# Patient Record
Sex: MALE | Race: WHITE | Employment: FULL TIME | URBAN - METROPOLITAN AREA
[De-identification: names, ages, dates, MRNs, and addresses within clinical notes are randomized per-mention and may not be internally consistent; named-entity substitution may affect disease eponyms.]

---

## 2021-05-05 ENCOUNTER — TRANSFERRED RECORDS (OUTPATIENT)
Dept: HEALTH INFORMATION MANAGEMENT | Facility: CLINIC | Age: 46
End: 2021-05-05

## 2021-12-02 ENCOUNTER — TELEPHONE (OUTPATIENT)
Dept: FAMILY MEDICINE | Facility: OTHER | Age: 46
End: 2021-12-02
Payer: COMMERCIAL

## 2021-12-02 NOTE — TELEPHONE ENCOUNTER
Patient is wondering if he can Establish Care with . Patient was told by nurse that he could be a new patient. Please call patient at [phone].

## 2021-12-06 NOTE — PROGRESS NOTES
Assessment & Plan     Need for prophylactic vaccination and inoculation against influenza  Shot given - had covid shot  - INFLUENZA VACCINE IM > 6 MONTHS VALENT IIV4 (AFLURIA/FLUZONE)    Type 1 diabetes mellitus without complication (H)  Fair control. Could be better. Will send to [facility] for to try getting a pump with CGM in it. No change in insulin for now. Work on pump ASAP. Pt agrees. Will see back in 6-7 weeks.   - CBC with Platelets & Differential  - Comprehensive metabolic panel  - Lipid Profile  - TSH  - Hemoglobin A1c  - Albumin Random Urine Quantitative with Creat Ratio  - Diabetes Education Referral ([place])  - insulin aspart (NOVOLOG PEN) 100 UNIT/ML pen; Inject 25 Units Subcutaneous 4 times daily (with meals and nightly)  - lisinopril (ZESTRIL) 5 MG tablet; Take 1 tablet (5 mg) by mouth daily    Erectile dysfunction due to diseases classified elsewhere  Risk and benefits of viagra and  like agent  was discussed and verbal consent to proceed was given.   Will try Viagra again. No h/o CAD or angina   - sildenafil (REVATIO) 20 MG tablet; 2-5 tablets orally 1 hour before intercourse    Mixed hyperlipidemia  Will add statin . Risk and benefits of statin was discussed and verbal consent to proceed was given. Follow-up in 6-7 bweeks   - atorvastatin (LIPITOR) 20 MG tablet; Take 1 tablet (20 mg) by mouth daily    Depression with anxiety  RF needed. Doing well..   - escitalopram (LEXAPRO) 20 MG tablet; Take 1 tablet (20 mg) by mouth daily    Encounter for medical examination to establish care  Will assume care    Elevated LFTs  Likely CISSE.  Will recheck in 6-7 weeks.       Review of external notes as documented elsewhere in note  Diagnosis or treatment significantly limited by social determinants of health - few notes from other facilities and new pt info pack reviewed   45 minutes spent on the date of the encounter doing chart review, history and exam, documentation and further activities per the note      "  BMI:   Estimated body mass index is 32.78 kg/m  as calculated from the following:    Height as of this encounter: 1.753 m (5' 9\").    Weight as of this encounter: 100.7 kg (222 lb).           No follow-ups on file.    Selwyn Ashraf MD  [facility] - SANJEEV Vázquez is a 46 year old who presents for the following health issues     Pt moved from [place]   HPI     Diabetes Follow-up    How often are you checking your blood sugar? Four or more times daily  Blood sugar testing frequency justification:  Adjustment of medication(s)  What time of day are you checking your blood sugars (select all that apply)?  Before meals  Have you had any blood sugars above 200?  Yes frequent   Have you had any blood sugars below 70?  No    What symptoms do you notice when your blood sugar is low?  Shaky, Dizzy and Weak    What concerns do you have today about your diabetes? None     Do you have any of these symptoms? (Select all that apply)  No numbness or tingling in feet.  No redness, sores or blisters on feet.  No complaints of excessive thirst.  No reports of blurry vision.  No significant changes to weight.    Was on a insulin pump then lost insurance coverage  A1C around 8   More highs than low BS   Checks finger sticks 4-5 times a day   On 4 time of day Novolog    BP Readings from Last 2 Encounters:   12/15/21 108/72     No results found for: A1C, LDL      Hypertension Follow-up      Do you check your blood pressure regularly outside of the clinic? No     Are you following a low salt diet? No    Are your blood pressures ever more than 140 on the top number (systolic) OR more   than 90 on the bottom number (diastolic), for example 140/90? No    Depression and Anxiety Follow-Up    How are you doing with your depression since your last visit? Improved     How are you doing with your anxiety since your last visit?  Improved     Are you having other symptoms that might be associated with " "depression or anxiety? No    Have you had a significant life event? No     Do you have any concerns with your use of alcohol or other drugs? No    Social History     Tobacco Use     Smoking status: Never Smoker     Smokeless tobacco: Never Used   Substance Use Topics     Alcohol use: Yes     Drug use: Not on file     PHQ 12/15/2021   PHQ-9 Total Score 4   Q9: Thoughts of better off dead/self-harm past 2 weeks Not at all     SIOBHAN-7 SCORE 12/15/2021   Total Score 0     Last PHQ-9 12/15/2021   1.  Little interest or pleasure in doing things 0   2.  Feeling down, depressed, or hopeless 0   3.  Trouble falling or staying asleep, or sleeping too much 2   4.  Feeling tired or having little energy 2   5.  Poor appetite or overeating 0   6.  Feeling bad about yourself 0   7.  Trouble concentrating 0   8.  Moving slowly or restless 0   Q9: Thoughts of better off dead/self-harm past 2 weeks 0   PHQ-9 Total Score 4   Difficulty at work, home, or with people Somewhat difficult     SIOBHAN-7  12/15/2021   1. Feeling nervous, anxious, or on edge 0   2. Not being able to stop or control worrying 0   3. Worrying too much about different things 0   4. Trouble relaxing 0   5. Being so restless that it is hard to sit still 0   6. Becoming easily annoyed or irritable 0   7. Feeling afraid, as if something awful might happen 0   SIOBHAN-7 Total Score 0       Suicide Assessment Five-step Evaluation and Treatment (SAFE-T)            Review of Systems   Constitutional, HEENT, cardiovascular, pulmonary, gi and gu systems are negative, except as otherwise noted.    ED  Issues - tried Viagra remotely - caused HA  Objective    /72   Pulse 88   Temp (!) 96.1  F (35.6  C)   Ht 1.753 m (5' 9\")   Wt 100.7 kg (222 lb)   SpO2 96%   BMI 32.78 kg/m    Body mass index is 32.78 kg/m .  Physical Exam   GENERAL: healthy, alert and no distress  NECK: no adenopathy, no asymmetry, masses, or scars and thyroid normal to palpation  RESP: lungs clear to " auscultation - no rales, rhonchi or wheezes  CV: regular rate and rhythm, normal S1 S2, no S3 or S4, no murmur, click or rub, no peripheral edema and peripheral pulses strong  ABDOMEN: soft, nontender, no hepatosplenomegaly, no masses and bowel sounds normal  MS: no gross musculoskeletal defects noted, no edema    Results for orders placed or performed in visit on 12/15/21   Comprehensive metabolic panel     Status: Abnormal   Result Value Ref Range    Sodium 137 133 - 144 mmol/L    Potassium 3.9 3.4 - 5.3 mmol/L    Chloride 104 94 - 109 mmol/L    Carbon Dioxide (CO2) 25 20 - 32 mmol/L    Anion Gap 8 3 - 14 mmol/L    Urea Nitrogen 15 7 - 30 mg/dL    Creatinine 0.69 0.66 - 1.25 mg/dL    Calcium 8.8 8.5 - 10.1 mg/dL    Glucose 207 (H) 70 - 99 mg/dL    Alkaline Phosphatase 128 40 - 150 U/L    AST 97 (H) 0 - 45 U/L     (H) 0 - 70 U/L    Protein Total 7.0 6.8 - 8.8 g/dL    Albumin 3.9 3.4 - 5.0 g/dL    Bilirubin Total 0.5 0.2 - 1.3 mg/dL    GFR Estimate >90 >60 mL/min/1.73m2   Lipid Profile     Status: Abnormal   Result Value Ref Range    Cholesterol 229 (H) <200 mg/dL    Triglycerides 131 <150 mg/dL    Direct Measure HDL 60 >=40 mg/dL    LDL Cholesterol Calculated 143 (H) <=100 mg/dL    Non HDL Cholesterol 169 (H) <130 mg/dL    Patient Fasting > 8hrs? Yes     Narrative    Cholesterol  Desirable:  <200 mg/dL    Triglycerides  Normal:  Less than 150 mg/dL  Borderline High:  150-199 mg/dL  High:  200-499 mg/dL  Very High:  Greater than or equal to 500 mg/dL    Direct Measure HDL  Female:  Greater than or equal to 50 mg/dL   Male:  Greater than or equal to 40 mg/dL    LDL Cholesterol  Desirable:  <100mg/dL  Above Desirable:  100-129 mg/dL   Borderline High:  130-159 mg/dL   High:  160-189 mg/dL   Very High:  >= 190 mg/dL    Non HDL Cholesterol  Desirable:  130 mg/dL  Above Desirable:  130-159 mg/dL  Borderline High:  160-189 mg/dL  High:  190-219 mg/dL  Very High:  Greater than or equal to 220 mg/dL   TSH     Status:  Normal   Result Value Ref Range    TSH 0.96 0.40 - 4.00 mU/L   Hemoglobin A1c     Status: Abnormal   Result Value Ref Range    Estimated Average Glucose 200 mg/dL    Hemoglobin A1C 8.6 (H) 0.0 - 5.6 %   CBC with platelets and differential     Status: Abnormal   Result Value Ref Range    WBC Count 8.7 4.0 - 11.0 10e3/uL    RBC Count 4.93 4.40 - 5.90 10e6/uL    Hemoglobin 15.2 13.3 - 17.7 g/dL    Hematocrit 42.7 40.0 - 53.0 %    MCV 87 78 - 100 fL    MCH 30.8 26.5 - 33.0 pg    MCHC 35.6 31.5 - 36.5 g/dL    RDW 12.2 10.0 - 15.0 %    Platelet Count 223 150 - 450 10e3/uL    % Neutrophils 81 %    % Lymphocytes 9 %    % Monocytes 8 %    % Eosinophils 1 %    % Basophils 0 %    % Immature Granulocytes 1 %    NRBCs per 100 WBC 0 <1 /100    Absolute Neutrophils 7.2 1.6 - 8.3 10e3/uL    Absolute Lymphocytes 0.7 (L) 0.8 - 5.3 10e3/uL    Absolute Monocytes 0.7 0.0 - 1.3 10e3/uL    Absolute Eosinophils 0.0 0.0 - 0.7 10e3/uL    Absolute Basophils 0.0 0.0 - 0.2 10e3/uL    Absolute Immature Granulocytes 0.0 <=0.4 10e3/uL    Absolute NRBCs 0.0 10e3/uL   CBC with Platelets & Differential     Status: Abnormal    Narrative    The following orders were created for panel order CBC with Platelets & Differential.  Procedure                               Abnormality         Status                     ---------                               -----------         ------                     CBC with platelets and d...[[id number]]  Abnormal            Final result                 Please view results for these tests on the individual orders.

## 2021-12-15 ENCOUNTER — OFFICE VISIT (OUTPATIENT)
Dept: FAMILY MEDICINE | Facility: OTHER | Age: 46
End: 2021-12-15
Attending: FAMILY MEDICINE
Payer: COMMERCIAL

## 2021-12-15 VITALS
WEIGHT: 222 LBS | DIASTOLIC BLOOD PRESSURE: 72 MMHG | SYSTOLIC BLOOD PRESSURE: 108 MMHG | HEIGHT: 69 IN | OXYGEN SATURATION: 96 % | HEART RATE: 88 BPM | TEMPERATURE: 96.1 F | BODY MASS INDEX: 32.88 KG/M2

## 2021-12-15 DIAGNOSIS — R79.89 ELEVATED LFTS: ICD-10-CM

## 2021-12-15 DIAGNOSIS — N52.1 ERECTILE DYSFUNCTION DUE TO DISEASES CLASSIFIED ELSEWHERE: ICD-10-CM

## 2021-12-15 DIAGNOSIS — E78.2 MIXED HYPERLIPIDEMIA: ICD-10-CM

## 2021-12-15 DIAGNOSIS — F41.8 DEPRESSION WITH ANXIETY: ICD-10-CM

## 2021-12-15 DIAGNOSIS — Z23 NEED FOR PROPHYLACTIC VACCINATION AND INOCULATION AGAINST INFLUENZA: Primary | ICD-10-CM

## 2021-12-15 DIAGNOSIS — Z00.00 ENCOUNTER FOR MEDICAL EXAMINATION TO ESTABLISH CARE: ICD-10-CM

## 2021-12-15 DIAGNOSIS — E10.9 TYPE 1 DIABETES MELLITUS WITHOUT COMPLICATION (H): ICD-10-CM

## 2021-12-15 LAB
ALBUMIN SERPL-MCNC: 3.9 G/DL (ref 3.4–5)
ALP SERPL-CCNC: 128 U/L (ref 40–150)
ALT SERPL W P-5'-P-CCNC: 129 U/L (ref 0–70)
ANION GAP SERPL CALCULATED.3IONS-SCNC: 8 MMOL/L (ref 3–14)
AST SERPL W P-5'-P-CCNC: 97 U/L (ref 0–45)
BASOPHILS # BLD AUTO: 0 10E3/UL (ref 0–0.2)
BASOPHILS NFR BLD AUTO: 0 %
BILIRUB SERPL-MCNC: 0.5 MG/DL (ref 0.2–1.3)
BUN SERPL-MCNC: 15 MG/DL (ref 7–30)
CALCIUM SERPL-MCNC: 8.8 MG/DL (ref 8.5–10.1)
CHLORIDE BLD-SCNC: 104 MMOL/L (ref 94–109)
CHOLEST SERPL-MCNC: 229 MG/DL
CO2 SERPL-SCNC: 25 MMOL/L (ref 20–32)
CREAT SERPL-MCNC: 0.69 MG/DL (ref 0.66–1.25)
CREAT UR-MCNC: 127 MG/DL
EOSINOPHIL # BLD AUTO: 0 10E3/UL (ref 0–0.7)
EOSINOPHIL NFR BLD AUTO: 1 %
ERYTHROCYTE [DISTWIDTH] IN BLOOD BY AUTOMATED COUNT: 12.2 % (ref 10–15)
EST. AVERAGE GLUCOSE BLD GHB EST-MCNC: 200 MG/DL
FASTING STATUS PATIENT QL REPORTED: YES
GFR SERPL CREATININE-BSD FRML MDRD: >90 ML/MIN/1.73M2
GLUCOSE BLD-MCNC: 207 MG/DL (ref 70–99)
HBA1C MFR BLD: 8.6 % (ref 0–5.6)
HCT VFR BLD AUTO: 42.7 % (ref 40–53)
HDLC SERPL-MCNC: 60 MG/DL
HGB BLD-MCNC: 15.2 G/DL (ref 13.3–17.7)
IMM GRANULOCYTES # BLD: 0 10E3/UL
IMM GRANULOCYTES NFR BLD: 1 %
LDLC SERPL CALC-MCNC: 143 MG/DL
LYMPHOCYTES # BLD AUTO: 0.7 10E3/UL (ref 0.8–5.3)
LYMPHOCYTES NFR BLD AUTO: 9 %
MCH RBC QN AUTO: 30.8 PG (ref 26.5–33)
MCHC RBC AUTO-ENTMCNC: 35.6 G/DL (ref 31.5–36.5)
MCV RBC AUTO: 87 FL (ref 78–100)
MICROALBUMIN UR-MCNC: 7 MG/L
MICROALBUMIN/CREAT UR: 5.51 MG/G CR (ref 0–17)
MONOCYTES # BLD AUTO: 0.7 10E3/UL (ref 0–1.3)
MONOCYTES NFR BLD AUTO: 8 %
NEUTROPHILS # BLD AUTO: 7.2 10E3/UL (ref 1.6–8.3)
NEUTROPHILS NFR BLD AUTO: 81 %
NONHDLC SERPL-MCNC: 169 MG/DL
NRBC # BLD AUTO: 0 10E3/UL
NRBC BLD AUTO-RTO: 0 /100
PLATELET # BLD AUTO: 223 10E3/UL (ref 150–450)
POTASSIUM BLD-SCNC: 3.9 MMOL/L (ref 3.4–5.3)
PROT SERPL-MCNC: 7 G/DL (ref 6.8–8.8)
RBC # BLD AUTO: 4.93 10E6/UL (ref 4.4–5.9)
SODIUM SERPL-SCNC: 137 MMOL/L (ref 133–144)
TRIGL SERPL-MCNC: 131 MG/DL
TSH SERPL DL<=0.005 MIU/L-ACNC: 0.96 MU/L (ref 0.4–4)
WBC # BLD AUTO: 8.7 10E3/UL (ref 4–11)

## 2021-12-15 PROCEDURE — 90686 IIV4 VACC NO PRSV 0.5 ML IM: CPT | Performed by: FAMILY MEDICINE

## 2021-12-15 PROCEDURE — 83036 HEMOGLOBIN GLYCOSYLATED A1C: CPT | Performed by: FAMILY MEDICINE

## 2021-12-15 PROCEDURE — 80061 LIPID PANEL: CPT | Performed by: FAMILY MEDICINE

## 2021-12-15 PROCEDURE — 90471 IMMUNIZATION ADMIN: CPT | Performed by: FAMILY MEDICINE

## 2021-12-15 PROCEDURE — 82043 UR ALBUMIN QUANTITATIVE: CPT | Performed by: FAMILY MEDICINE

## 2021-12-15 PROCEDURE — 80050 GENERAL HEALTH PANEL: CPT | Performed by: FAMILY MEDICINE

## 2021-12-15 PROCEDURE — 36415 COLL VENOUS BLD VENIPUNCTURE: CPT | Performed by: FAMILY MEDICINE

## 2021-12-15 PROCEDURE — 99204 OFFICE O/P NEW MOD 45 MIN: CPT | Mod: 25 | Performed by: FAMILY MEDICINE

## 2021-12-15 RX ORDER — LISINOPRIL 5 MG/1
5 TABLET ORAL DAILY
COMMUNITY
End: 2021-12-15

## 2021-12-15 RX ORDER — ATORVASTATIN CALCIUM 20 MG/1
20 TABLET, FILM COATED ORAL DAILY
Qty: 60 TABLET | Refills: 0 | Status: SHIPPED | OUTPATIENT
Start: 2021-12-15 | End: 2022-01-10

## 2021-12-15 RX ORDER — LISINOPRIL 5 MG/1
5 TABLET ORAL DAILY
Qty: 90 TABLET | Refills: 3 | Status: SHIPPED | OUTPATIENT
Start: 2021-12-15 | End: 2022-03-29

## 2021-12-15 RX ORDER — ESCITALOPRAM OXALATE 20 MG/1
20 TABLET ORAL DAILY
Qty: 90 TABLET | Refills: 3 | Status: SHIPPED | OUTPATIENT
Start: 2021-12-15 | End: 2022-03-29

## 2021-12-15 RX ORDER — ESCITALOPRAM OXALATE 20 MG/1
20 TABLET ORAL DAILY
COMMUNITY
End: 2021-12-15

## 2021-12-15 RX ORDER — SILDENAFIL CITRATE 20 MG/1
TABLET ORAL
Qty: 30 TABLET | Refills: 3 | Status: SHIPPED | OUTPATIENT
Start: 2021-12-15 | End: 2022-03-04

## 2021-12-15 ASSESSMENT — PAIN SCALES - GENERAL: PAINLEVEL: NO PAIN (0)

## 2021-12-15 ASSESSMENT — ANXIETY QUESTIONNAIRES
3. WORRYING TOO MUCH ABOUT DIFFERENT THINGS: NOT AT ALL
5. BEING SO RESTLESS THAT IT IS HARD TO SIT STILL: NOT AT ALL
7. FEELING AFRAID AS IF SOMETHING AWFUL MIGHT HAPPEN: NOT AT ALL
GAD7 TOTAL SCORE: 0
6. BECOMING EASILY ANNOYED OR IRRITABLE: NOT AT ALL
1. FEELING NERVOUS, ANXIOUS, OR ON EDGE: NOT AT ALL
4. TROUBLE RELAXING: NOT AT ALL
2. NOT BEING ABLE TO STOP OR CONTROL WORRYING: NOT AT ALL

## 2021-12-15 ASSESSMENT — MIFFLIN-ST. JEOR: SCORE: 1877.37

## 2021-12-15 ASSESSMENT — PATIENT HEALTH QUESTIONNAIRE - PHQ9: SUM OF ALL RESPONSES TO PHQ QUESTIONS 1-9: 4

## 2021-12-15 NOTE — NURSING NOTE
"Chief Complaint   Patient presents with     Establish Care       Initial /72   Pulse 88   Temp (!) 96.1  F (35.6  C)   Ht 1.753 m (5' 9\")   Wt 100.7 kg (222 lb)   SpO2 96%   BMI 32.78 kg/m   Estimated body mass index is 32.78 kg/m  as calculated from the following:    Height as of this encounter: 1.753 m (5' 9\").    Weight as of this encounter: 100.7 kg (222 lb).  Medication Reconciliation: complete  Richard Cao LPN  "

## 2021-12-15 NOTE — PATIENT INSTRUCTIONS
Results for orders placed or performed in visit on 12/15/21   Comprehensive metabolic panel     Status: Abnormal   Result Value Ref Range    Sodium 137 133 - 144 mmol/L    Potassium 3.9 3.4 - 5.3 mmol/L    Chloride 104 94 - 109 mmol/L    Carbon Dioxide (CO2) 25 20 - 32 mmol/L    Anion Gap 8 3 - 14 mmol/L    Urea Nitrogen 15 7 - 30 mg/dL    Creatinine 0.69 0.66 - 1.25 mg/dL    Calcium 8.8 8.5 - 10.1 mg/dL    Glucose 207 (H) 70 - 99 mg/dL    Alkaline Phosphatase 128 40 - 150 U/L    AST 97 (H) 0 - 45 U/L     (H) 0 - 70 U/L    Protein Total 7.0 6.8 - 8.8 g/dL    Albumin 3.9 3.4 - 5.0 g/dL    Bilirubin Total 0.5 0.2 - 1.3 mg/dL    GFR Estimate >90 >60 mL/min/1.73m2   Lipid Profile     Status: Abnormal   Result Value Ref Range    Cholesterol 229 (H) <200 mg/dL    Triglycerides 131 <150 mg/dL    Direct Measure HDL 60 >=40 mg/dL    LDL Cholesterol Calculated 143 (H) <=100 mg/dL    Non HDL Cholesterol 169 (H) <130 mg/dL    Patient Fasting > 8hrs? Yes     Narrative    Cholesterol  Desirable:  <200 mg/dL    Triglycerides  Normal:  Less than 150 mg/dL  Borderline High:  150-199 mg/dL  High:  200-499 mg/dL  Very High:  Greater than or equal to 500 mg/dL    Direct Measure HDL  Female:  Greater than or equal to 50 mg/dL   Male:  Greater than or equal to 40 mg/dL    LDL Cholesterol  Desirable:  <100mg/dL  Above Desirable:  100-129 mg/dL   Borderline High:  130-159 mg/dL   High:  160-189 mg/dL   Very High:  >= 190 mg/dL    Non HDL Cholesterol  Desirable:  130 mg/dL  Above Desirable:  130-159 mg/dL  Borderline High:  160-189 mg/dL  High:  190-219 mg/dL  Very High:  Greater than or equal to 220 mg/dL   TSH     Status: Normal   Result Value Ref Range    TSH 0.96 0.40 - 4.00 mU/L   Hemoglobin A1c     Status: Abnormal   Result Value Ref Range    Estimated Average Glucose 200 mg/dL    Hemoglobin A1C 8.6 (H) 0.0 - 5.6 %   Albumin Random Urine Quantitative with Creat Ratio     Status: None   Result Value Ref Range    Creatinine Urine  mg/dL 127 mg/dL    Albumin Urine mg/L 7 mg/L    Albumin Urine mg/g Cr 5.51 0.00 - 17.00 mg/g Cr   CBC with platelets and differential     Status: Abnormal   Result Value Ref Range    WBC Count 8.7 4.0 - 11.0 10e3/uL    RBC Count 4.93 4.40 - 5.90 10e6/uL    Hemoglobin 15.2 13.3 - 17.7 g/dL    Hematocrit 42.7 40.0 - 53.0 %    MCV 87 78 - 100 fL    MCH 30.8 26.5 - 33.0 pg    MCHC 35.6 31.5 - 36.5 g/dL    RDW 12.2 10.0 - 15.0 %    Platelet Count 223 150 - 450 10e3/uL    % Neutrophils 81 %    % Lymphocytes 9 %    % Monocytes 8 %    % Eosinophils 1 %    % Basophils 0 %    % Immature Granulocytes 1 %    NRBCs per 100 WBC 0 <1 /100    Absolute Neutrophils 7.2 1.6 - 8.3 10e3/uL    Absolute Lymphocytes 0.7 (L) 0.8 - 5.3 10e3/uL    Absolute Monocytes 0.7 0.0 - 1.3 10e3/uL    Absolute Eosinophils 0.0 0.0 - 0.7 10e3/uL    Absolute Basophils 0.0 0.0 - 0.2 10e3/uL    Absolute Immature Granulocytes 0.0 <=0.4 10e3/uL    Absolute NRBCs 0.0 10e3/uL   CBC with Platelets & Differential     Status: Abnormal    Narrative    The following orders were created for panel order CBC with Platelets & Differential.  Procedure                               Abnormality         Status                     ---------                               -----------         ------                     CBC with platelets and d...[[id number]]  Abnormal            Final result                 Please view results for these tests on the individual orders.

## 2021-12-16 ASSESSMENT — ANXIETY QUESTIONNAIRES: GAD7 TOTAL SCORE: 0

## 2021-12-30 ENCOUNTER — ALLIED HEALTH/NURSE VISIT (OUTPATIENT)
Dept: EDUCATION SERVICES | Facility: OTHER | Age: 46
End: 2021-12-30
Attending: FAMILY MEDICINE
Payer: COMMERCIAL

## 2021-12-30 VITALS
DIASTOLIC BLOOD PRESSURE: 80 MMHG | WEIGHT: 229.9 LBS | SYSTOLIC BLOOD PRESSURE: 118 MMHG | RESPIRATION RATE: 16 BRPM | HEIGHT: 68 IN | BODY MASS INDEX: 34.84 KG/M2 | OXYGEN SATURATION: 98 % | HEART RATE: 85 BPM

## 2021-12-30 DIAGNOSIS — D21.20 FIBROMA OF FOOT, UNSPECIFIED LATERALITY: Primary | ICD-10-CM

## 2021-12-30 DIAGNOSIS — E10.9 TYPE 1 DIABETES MELLITUS WITHOUT COMPLICATION (H): ICD-10-CM

## 2021-12-30 PROCEDURE — 99214 OFFICE O/P EST MOD 30 MIN: CPT | Performed by: NURSE PRACTITIONER

## 2021-12-30 RX ORDER — INSULIN DEGLUDEC 100 U/ML
40 INJECTION, SOLUTION SUBCUTANEOUS DAILY
Qty: 15 ML | Refills: 1 | Status: SHIPPED | OUTPATIENT
Start: 2021-12-30 | End: 2022-05-24

## 2021-12-30 ASSESSMENT — MIFFLIN-ST. JEOR: SCORE: 1897.32

## 2021-12-30 ASSESSMENT — PAIN SCALES - GENERAL: PAINLEVEL: NO PAIN (0)

## 2021-12-30 NOTE — PATIENT INSTRUCTIONS
Continue working on healthy eating and moving (start low and slow, work up to 30 min, 5x/week)    BG goals:  Fasting and before meals <130, >80  2 hour after eating <180    We only need 1/2 of these numbers to be within target then your A1c will be within target    Medication changes   1.  Tresiba  Restart at 20 units daily    Titrate every 3-4 days based on your AM blood glucose    2.  Novolog  Keep 1:15 for now + correction      Follow up   1 week after insulin pump start    Call me sooner if any problems/concerns and/or questions develop including consistent low BGs <70 or consistent high BGs >200  [phone] (Unit Coordinator)    [phone] (Nurse)

## 2021-12-30 NOTE — PROGRESS NOTES
SUBJECTIVE:  Gurpreet Mg, 46 year old, male presents with the following Chief Complaint(s) with HPI to follow:  Chief Complaint   Patient presents with     Diabetes        Diabetes Follow-up      Patient is checking blood sugars: 3 times daily.  Results:  No meter      Symptoms of hypoglycemia (low blood sugar): none    Paresthesias (numbness or burning in feet) or sores: No    Diabetic eye exam within the last year: DUE    Breakfast eaten regularly: Yes    Patient counting carbs: Yes       HPI:  Gabriel's here today for the follow up regarding his Diabetes mellitus, JESSICA.    Lab Results   Component Value Date    A1C 8.6 12/15/2021     Current Diabetes medication:   1. Novolog 1:15 + correction. TDD: 100 units  2. Tresiba 40 units--hasn't been taking as he ran out  ASA use: no--intentional  Statin use: yes    Gabriel reports the following:  Diagnosed with diabetes: 26 years old  Family hx of diabetes: dad--type 2,     Lost weight prior to diagnosis--40 lb  He was also symptomatic    Wondering if he could get a referral   Issues with his plantar fibroma.      No other acute health concerns today.      Establishing care with Dr. Ashraf    Interested in a personal CGM and possibly an insulin pump    Patient Active Problem List   Diagnosis     Erectile dysfunction due to diseases classified elsewhere     Type 1 diabetes mellitus without complication (H)     Depression with anxiety     Mixed hyperlipidemia     Elevated LFTs       Past Medical History:   Diagnosis Date     Anxiety      Depressive disorder      Hypertension      Type 1 diabetes (H)        Past Surgical History:   Procedure Laterality Date     APPENDECTOMY  01/1985     BACK SURGERY  2010    lumbar calcium deposits removed     CATARACT EXTRACTION Right 2021       Family History   Problem Relation Age of Onset     Hypertension Father      Hyperlipidemia Father      Obesity Father      Obesity Brother      Hypertension Brother      Depression Brother   "      Social History     Tobacco Use     Smoking status: Never Smoker     Smokeless tobacco: Never Used   Substance Use Topics     Alcohol use: Yes       Current Outpatient Medications   Medication Sig Dispense Refill     atorvastatin (LIPITOR) 20 MG tablet Take 1 tablet (20 mg) by mouth daily 60 tablet 0     escitalopram (LEXAPRO) 20 MG tablet Take 1 tablet (20 mg) by mouth daily 90 tablet 3     insulin aspart (NOVOLOG PEN) 100 UNIT/ML pen Inject 25 Units Subcutaneous 4 times daily (with meals and nightly) 15 mL 3     insulin degludec (TRESIBA FLEXTOUCH) 100 UNIT/ML pen Inject 40 Units Subcutaneous daily 15 mL 1     insulin pen needle (30G X 8 MM) 30G X 8 MM miscellaneous Use 3 pen needles daily or as directed. 100 each 11     lisinopril (ZESTRIL) 5 MG tablet Take 1 tablet (5 mg) by mouth daily 90 tablet 3     sildenafil (REVATIO) 20 MG tablet 2-5 tablets orally 1 hour before intercourse 30 tablet 3       No Known Allergies    REVIEW OF SYSTEMS  Skin: negative; brittle nails  Eyes: negative  Ears/Nose/Throat: negative  Respiratory: No shortness of breath, dyspnea on exertion, cough, or hemoptysis  Cardiovascular: negative  Gastrointestinal: negative  Genitourinary: negative  Musculoskeletal: negative  Neurologic: negative; hx of plantar fibroma   Psychiatric: positive for anxiety and depression  Hematologic/Lymphatic/Immunologic: negative  Endocrine: positive for diabetes    OBJECTIVE:  /80   Pulse 85   Resp 16   Ht 1.727 m (5' 8\")   Wt 104.3 kg (229 lb 14.4 oz)   SpO2 98%   BMI 34.96 kg/m    Constitutional: healthy, alert and no distress  Respiratory:  Good diaphragmatic excursion.   Musculoskeletal: extremities normal- no gross deformities noted and gait normal  Skin: no suspicious lesions or rashes  Psychiatric: mentation appears normal and affect normal/bright    LABS  No results found for any visits on 12/30/21.    ASSESSMENT / PLAN:  (D21.20) Fibroma of foot, unspecified laterality  (primary " encounter diagnosis)  Comment: noted  Plan: Orthopedic  Referral          Referral to Dr. Salazar    (E10.9) Type 1 diabetes mellitus without complication (H)  Comment: noted   Plan: insulin degludec (TRESIBA FLEXTOUCH) 100         UNIT/ML pen, insulin pen needle (30G X 8 MM)         30G X 8 MM miscellaneous          Reordered Tresiba.   Asked him to restart it at 20 units daily  Titrate every 3-4 days based on AM BGs.      Gave him a sample of the Dexcom G6  This was started in the clinic  Education on this works    Education also on what insulin pump are available.   He's interested in the Tandem t:slim x 2 with control IQ insulin pump.    Message sent to [employer] rep    Follow up  Let me know if you want me to order the Dexcom  Keep us in the loop with the pump    Patient Instructions   Continue working on healthy eating and moving (start low and slow, work up to 30 min, 5x/week)    BG goals:  Fasting and before meals <130, >80  2 hour after eating <180    We only need 1/2 of these numbers to be within target then your A1c will be within target    Medication changes   1.  Tresiba  Restart at 20 units daily    Titrate every 3-4 days based on your AM blood glucose    2.  Novolog  Keep 1:15 for now + correction      Follow up   1 week after insulin pump start    Call me sooner if any problems/concerns and/or questions develop including consistent low BGs <70 or consistent high BGs >200  [phone] (Unit Coordinator)    [phone] (Nurse)        Time: 35 min  Barrier: none  Willingness to learn: accepting    Evelin GUZMAN [facility]  Diabetes and Wound Care    Cc: Dr. Chauhan    35 minutes was spent with patient.    All of this time was spent on counseling patient regarding illness, medication and/or treatment options, coordinating further cares and follow ups that are needed along with resource material that will be helpful in the treatment of these issues.     With the electronic record, we can  now more quickly and easily track our patient diabetic goals. Our diabetes clinical review is in progress and these are the indicators we are monitoring for good diabetes health.     1.) HbA1C less than 7 (measurement of your average blood sugars)  2.) Blood Pressure less than 140/80  3.) LDL less than 100 (bad cholesterol)  4.) HbA1C is checked in the last 6 months and below 7% (more frequently if not at goal or adjusting medications)  5.) LDL is checked in the last 12 months (more frequently if not at goal or adjusting medications)  6.) Taking one baby aspirin daily (unless otherwise instructed)  7.) No tobacco use  8) Statin use     You have achieved 5 out of 8 of these and I am encouraging you to come in and get tuned up to achieve 8 out of 8!  Here is what you have achieved so far in my goals for you:  1.) HbA1C  less than 7:                              NO  Your last  HbA1C :  Lab Results   Component Value Date    A1C 8.6 12/15/2021   .  2.) Blood Pressure less than 140/80:       YES      Your last    BP Readings from Last 1 Encounters:   12/30/21 118/80     3.) LDL less than 100:                              NO   Your last     LDL Cholesterol Calculated   Date Value Ref Range Status   12/15/2021 143 (H) <=100 mg/dL Final       4.) Checked HbA1C in the past 6 months: YES      5.) Checked LDL in the past 12 months:    YES      6.) Taking one aspirin daily:                       NO  7.) No tobacco use:                                        YES      8.) Statin use      YES

## 2021-12-30 NOTE — PROGRESS NOTES
"Chief Complaint   Patient presents with     Diabetes       Initial /80   Pulse 85   Resp 16   Wt 104.3 kg (229 lb 14.4 oz)   SpO2 98%   BMI 33.95 kg/m   Estimated body mass index is 33.95 kg/m  as calculated from the following:    Height as of 12/15/21: 1.753 m (5' 9\").    Weight as of this encounter: 104.3 kg (229 lb 14.4 oz).  Medication Reconciliation: complete  Martha Tyler LPN    "

## 2022-01-09 DIAGNOSIS — E78.2 MIXED HYPERLIPIDEMIA: ICD-10-CM

## 2022-01-10 ENCOUNTER — TELEPHONE (OUTPATIENT)
Dept: EDUCATION SERVICES | Facility: OTHER | Age: 47
End: 2022-01-10
Payer: COMMERCIAL

## 2022-01-10 DIAGNOSIS — E10.9 TYPE 1 DIABETES MELLITUS WITHOUT COMPLICATION (H): Primary | ICD-10-CM

## 2022-01-10 RX ORDER — PROCHLORPERAZINE 25 MG/1
1 SUPPOSITORY RECTAL
Qty: 3 EACH | Refills: 11 | Status: SHIPPED | OUTPATIENT
Start: 2022-01-10 | End: 2022-03-29

## 2022-01-10 RX ORDER — PROCHLORPERAZINE 25 MG/1
1 SUPPOSITORY RECTAL ONCE
Qty: 1 EACH | Refills: 0 | Status: SHIPPED | OUTPATIENT
Start: 2022-01-10 | End: 2022-01-10

## 2022-01-10 RX ORDER — ATORVASTATIN CALCIUM 20 MG/1
TABLET, FILM COATED ORAL
Qty: 30 TABLET | Refills: 0 | Status: SHIPPED | OUTPATIENT
Start: 2022-01-10 | End: 2022-03-04

## 2022-01-10 RX ORDER — PROCHLORPERAZINE 25 MG/1
1 SUPPOSITORY RECTAL
Qty: 1 EACH | Refills: 3 | Status: SHIPPED | OUTPATIENT
Start: 2022-01-10 | End: 2022-03-29

## 2022-01-11 DIAGNOSIS — E10.9 TYPE 1 DIABETES MELLITUS WITHOUT COMPLICATION (H): Primary | ICD-10-CM

## 2022-01-27 ENCOUNTER — MEDICAL CORRESPONDENCE (OUTPATIENT)
Dept: HEALTH INFORMATION MANAGEMENT | Facility: CLINIC | Age: 47
End: 2022-01-27
Payer: COMMERCIAL

## 2022-02-08 ENCOUNTER — MEDICAL CORRESPONDENCE (OUTPATIENT)
Dept: HEALTH INFORMATION MANAGEMENT | Facility: CLINIC | Age: 47
End: 2022-02-08
Payer: COMMERCIAL

## 2022-02-17 ENCOUNTER — LAB (OUTPATIENT)
Dept: LAB | Facility: OTHER | Age: 47
End: 2022-02-17
Payer: COMMERCIAL

## 2022-02-17 DIAGNOSIS — E78.2 MIXED HYPERLIPIDEMIA: ICD-10-CM

## 2022-02-17 DIAGNOSIS — R79.89 ELEVATED LFTS: ICD-10-CM

## 2022-02-17 DIAGNOSIS — E10.9 TYPE 1 DIABETES MELLITUS WITHOUT COMPLICATION (H): Primary | ICD-10-CM

## 2022-02-17 LAB
ALBUMIN SERPL-MCNC: 3.9 G/DL (ref 3.4–5)
ALP SERPL-CCNC: 142 U/L (ref 40–150)
ALT SERPL W P-5'-P-CCNC: 50 U/L (ref 0–70)
ANION GAP SERPL CALCULATED.3IONS-SCNC: 14 MMOL/L (ref 3–14)
AST SERPL W P-5'-P-CCNC: 21 U/L (ref 0–45)
BILIRUB SERPL-MCNC: 0.9 MG/DL (ref 0.2–1.3)
BUN SERPL-MCNC: 21 MG/DL (ref 7–30)
CALCIUM SERPL-MCNC: 9.3 MG/DL (ref 8.5–10.1)
CHLORIDE BLD-SCNC: 95 MMOL/L (ref 94–109)
CHOLEST SERPL-MCNC: 246 MG/DL
CO2 SERPL-SCNC: 22 MMOL/L (ref 20–32)
CREAT SERPL-MCNC: 0.84 MG/DL (ref 0.66–1.25)
EST. AVERAGE GLUCOSE BLD GHB EST-MCNC: 209 MG/DL
GFR SERPL CREATININE-BSD FRML MDRD: >90 ML/MIN/1.73M2
GLUCOSE BLD-MCNC: 428 MG/DL (ref 70–99)
HBA1C MFR BLD: 8.9 % (ref 0–5.6)
HDLC SERPL-MCNC: 55 MG/DL
HOLD SPECIMEN: NORMAL
LDLC SERPL CALC-MCNC: 166 MG/DL
NONHDLC SERPL-MCNC: 191 MG/DL
POTASSIUM BLD-SCNC: 4.2 MMOL/L (ref 3.4–5.3)
PROT SERPL-MCNC: 6.9 G/DL (ref 6.8–8.8)
SODIUM SERPL-SCNC: 131 MMOL/L (ref 133–144)
TRIGL SERPL-MCNC: 125 MG/DL

## 2022-02-17 PROCEDURE — 83036 HEMOGLOBIN GLYCOSYLATED A1C: CPT | Performed by: FAMILY MEDICINE

## 2022-02-17 PROCEDURE — 36415 COLL VENOUS BLD VENIPUNCTURE: CPT | Performed by: FAMILY MEDICINE

## 2022-02-17 PROCEDURE — 80053 COMPREHEN METABOLIC PANEL: CPT | Performed by: FAMILY MEDICINE

## 2022-02-17 PROCEDURE — 80061 LIPID PANEL: CPT | Performed by: FAMILY MEDICINE

## 2022-02-17 NOTE — PROGRESS NOTES
"  Assessment & Plan     Type 1 diabetes mellitus without complication (H)  Still suboptimal but in transition to insulin pump and dexcom. No changes. Getting training next week.  Follow-up in 3-4 months. Continue with DRC   - Lipid Profile; Future  - Hepatic function panel; Future    Mixed hyperlipidemia  Restart Lipitor . Check fasting labs in 2 months   - atorvastatin (LIPITOR) 20 MG tablet; Take 1 tablet (20 mg) by mouth daily  - Lipid Profile; Future  - Hepatic function panel; Future    Erectile dysfunction due to diseases classified elsewhere  Long discussion . Most likely daily Cialis not going to work and he does not want to try.  Injection /penile suppository option -- most likely will need prosthesis. Will send to [facility] to discuss. Pt is very much in agreement . Pt is aware needs A1C  Better   - Adult Urology Referral; Future             BMI:   Estimated body mass index is 32.39 kg/m  as calculated from the following:    Height as of 12/30/21: 1.727 m (5' 8\").    Weight as of this encounter: 96.6 kg (213 lb).           No follow-ups on file.    Selwyn Ashraf MD  [facility] - SANJEE[facility] Javier is a 46 year old who presents for the following health issues     HPI     Diabetes Follow-up    How often are you checking your blood sugar? Four or more times daily  Blood sugar testing frequency justification:  discovering patterns   What time of day are you checking your blood sugars (select all that apply)?  Before meals and At bedtime  Have you had any blood sugars above 200?  Yes 300  Have you had any blood sugars below 70?  No    What symptoms do you notice when your blood sugar is low?  Shaky, Dizzy, Lethargy, Confusion and Other: sweaty    What concerns do you have today about your diabetes? None     Do you have any of these symptoms? (Select all that apply)  Weight loss 30-40lb     Have you had a diabetic eye exam in the last 12 months? Yes- Date of last eye exam: 6/2021,  " Location: [facility]     Just got insulin pump- waiting on training - next week   Has Dexcom and using that -- will then sink pump together   Likes Dexcom -- likes to see the trends    Viagra not working -- went up to 5 and even six   No morning erections no erections at all with viagra    Tried pump - not like it    Tried prn Cialis but not daily   No trauma to pelvis or back       Had issues getting RF on lipitor. Now off again when had labs drawn       Hyperlipidemia Follow-Up      Are you regularly taking any medication or supplement to lower your cholesterol?   Yes- Atorvastatin    Are you having muscle aches or other side effects that you think could be caused by your cholesterol lowering medication?  No    Hypertension Follow-up      Do you check your blood pressure regularly outside of the clinic? No     Are you following a low salt diet? No    Are your blood pressures ever more than 140 on the top number (systolic) OR more   than 90 on the bottom number (diastolic), for example 140/90? No    BP Readings from Last 2 Encounters:   03/04/22 113/70   12/30/21 118/80     Hemoglobin A1C (%)   Date Value   02/17/2022 8.9 (H)   12/15/2021 8.6 (H)     LDL Cholesterol Calculated (mg/dL)   Date Value   02/17/2022 166 (H)   12/15/2021 143 (H)       Pain History:  When did you first notice your pain? - More than 6 weeks   Have you seen this provider for your pain in the past?   No   Where in your body do your have pain? Joints, low back, muscles  Are you seeing anyone else for your pain? No  What makes your pain better? 800mg ibuprofen  What makes your pain worse? Doing physical things   How has pain affected your ability to work? 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 wife    PHQ-9 SCORE 12/15/2021   PHQ-9 Total Score 4       SIOBHAN-7 SCORE 12/15/2021   Total Score 0             Chronic Pain - Initial Assessment:    How  would you describe your pain? Muscle atrophy, cramping, numbness in right arm down to hand  Have you had any recent changes to the severity or character of your pain? Intermittent and changes locations  Is there an underlying cause that has been identified? no  Has your ability to work or do daily activities changed recently because of your pain? Causes discomfort  Which of these pain treatments have you tried? Chiropractor, Physical Therapy and Other: NSAIDS  Previous medication treatments:  NSAIDs - ibuprofen (Motrin)              Review of Systems   Constitutional, HEENT, cardiovascular, pulmonary, gi and gu systems are negative, except as otherwise noted.      Objective    /70 (BP Location: Right arm, Patient Position: Sitting, Cuff Size: Adult Large)   Pulse 99   Temp 97.6  F (36.4  C) (Tympanic)   Resp 16   Wt 96.6 kg (213 lb)   SpO2 98%   BMI 32.39 kg/m    Body mass index is 32.39 kg/m .  Physical Exam   GENERAL: healthy, alert and no distress  NECK: no adenopathy, no asymmetry, masses, or scars and thyroid normal to palpation  RESP: lungs clear to auscultation - no rales, rhonchi or wheezes  CV: regular rate and rhythm, normal S1 S2, no S3 or S4, no murmur, click or rub, no peripheral edema and peripheral pulses strong  MS: no gross musculoskeletal defects noted, no edema    No results found for this or any previous visit (from the past 48 hour(s)).    Orders Only on 02/17/2022   Component Date Value Ref Range Status     Sodium 02/17/2022 131 (A) 133 - 144 mmol/L Final     Potassium 02/17/2022 4.2  3.4 - 5.3 mmol/L Final     Chloride 02/17/2022 95  94 - 109 mmol/L Final     Carbon Dioxide (CO2) 02/17/2022 22  20 - 32 mmol/L Final     Anion Gap 02/17/2022 14  3 - 14 mmol/L Final     Urea Nitrogen 02/17/2022 21  7 - 30 mg/dL Final     Creatinine 02/17/2022 0.84  0.66 - 1.25 mg/dL Final     Calcium 02/17/2022 9.3  8.5 - 10.1 mg/dL Final     Glucose 02/17/2022 428 (A) 70 - 99 mg/dL Final     Alkaline  Phosphatase 02/17/2022 142  40 - 150 U/L Final     AST 02/17/2022 21  0 - 45 U/L Final     ALT 02/17/2022 50  0 - 70 U/L Final     Protein Total 02/17/2022 6.9  6.8 - 8.8 g/dL Final     Albumin 02/17/2022 3.9  3.4 - 5.0 g/dL Final     Bilirubin Total 02/17/2022 0.9  0.2 - 1.3 mg/dL Final     GFR Estimate 02/17/2022 >90  >60 mL/min/1.73m2 Final    Effective December 21, 2021 eGFRcr in adults is calculated using the 2021 CKD-EPI creatinine equation which includes age and gender (Ricky et al., NEJ, DOI: 10.1056/NDNYqu5172172)     Estimated Average Glucose 02/17/2022 209  mg/dL Final     Hemoglobin A1C 02/17/2022 8.9 (A) 0.0 - 5.6 % Final    Normal <5.7%   Prediabetes 5.7-6.4%    Diabetes 6.5% or higher     Note: Adopted from ADA consensus guidelines.     Cholesterol 02/17/2022 246 (A) <200 mg/dL Final     Triglycerides 02/17/2022 125  <150 mg/dL Final     Direct Measure HDL 02/17/2022 55  >=40 mg/dL Final     LDL Cholesterol Calculated 02/17/2022 166 (A) <=100 mg/dL Final     Non HDL Cholesterol 02/17/2022 191 (A) <130 mg/dL Final

## 2022-02-21 ENCOUNTER — MEDICAL CORRESPONDENCE (OUTPATIENT)
Dept: HEALTH INFORMATION MANAGEMENT | Facility: CLINIC | Age: 47
End: 2022-02-21
Payer: COMMERCIAL

## 2022-02-22 DIAGNOSIS — E10.9 TYPE 1 DIABETES MELLITUS WITHOUT COMPLICATION (H): Primary | ICD-10-CM

## 2022-02-22 NOTE — PROGRESS NOTES
Paperwork submitted for Tandem insulin pump start.     Novolog vials to his [facility] pharmacy.     Evelin GUZMAN [facility]-BC  Diabetes and Wound Care

## 2022-02-23 RX ORDER — INFUSION SET FOR INSULIN PUMP
INFUSION SETS-PARAPHERNALIA MISCELLANEOUS
COMMUNITY
Start: 2022-02-11 | End: 2022-03-24

## 2022-02-23 RX ORDER — PROCHLORPERAZINE 25 MG/1
SUPPOSITORY RECTAL
COMMUNITY
Start: 2022-01-12

## 2022-02-23 RX ORDER — INSULIN PUMP CARTRIDGE
CARTRIDGE (EA) SUBCUTANEOUS
COMMUNITY
Start: 2022-02-11 | End: 2022-03-24

## 2022-02-23 RX ORDER — SUBCUTANEOUS INSULIN PUMP
EACH MISCELLANEOUS
COMMUNITY
Start: 2022-02-11

## 2022-03-04 ENCOUNTER — OFFICE VISIT (OUTPATIENT)
Dept: FAMILY MEDICINE | Facility: OTHER | Age: 47
End: 2022-03-04
Attending: FAMILY MEDICINE
Payer: COMMERCIAL

## 2022-03-04 VITALS
BODY MASS INDEX: 32.39 KG/M2 | DIASTOLIC BLOOD PRESSURE: 70 MMHG | TEMPERATURE: 97.6 F | SYSTOLIC BLOOD PRESSURE: 113 MMHG | HEART RATE: 99 BPM | OXYGEN SATURATION: 98 % | RESPIRATION RATE: 16 BRPM | WEIGHT: 213 LBS

## 2022-03-04 DIAGNOSIS — E78.2 MIXED HYPERLIPIDEMIA: ICD-10-CM

## 2022-03-04 DIAGNOSIS — N52.1 ERECTILE DYSFUNCTION DUE TO DISEASES CLASSIFIED ELSEWHERE: ICD-10-CM

## 2022-03-04 DIAGNOSIS — E10.9 TYPE 1 DIABETES MELLITUS WITHOUT COMPLICATION (H): Primary | ICD-10-CM

## 2022-03-04 PROCEDURE — 99214 OFFICE O/P EST MOD 30 MIN: CPT | Performed by: FAMILY MEDICINE

## 2022-03-04 RX ORDER — ATORVASTATIN CALCIUM 20 MG/1
20 TABLET, FILM COATED ORAL DAILY
Qty: 90 TABLET | Refills: 0 | Status: SHIPPED | OUTPATIENT
Start: 2022-03-04 | End: 2022-03-29

## 2022-03-04 NOTE — NURSING NOTE
"Chief Complaint   Patient presents with     Recheck Medication       Initial /70 (BP Location: Right arm, Patient Position: Sitting, Cuff Size: Adult Large)   Pulse 99   Temp 97.6  F (36.4  C) (Tympanic)   Resp 16   Wt 96.6 kg (213 lb)   SpO2 98%   BMI 32.39 kg/m   Estimated body mass index is 32.39 kg/m  as calculated from the following:    Height as of 12/30/21: 1.727 m (5' 8\").    Weight as of this encounter: 96.6 kg (213 lb).  Medication Reconciliation: complete  Kali Paredes LPN  "

## 2022-03-07 ENCOUNTER — PRE VISIT (OUTPATIENT)
Dept: UROLOGY | Facility: CLINIC | Age: 47
End: 2022-03-07

## 2022-03-07 NOTE — TELEPHONE ENCOUNTER
MEDICAL RECORDS REQUEST   [facility]  Urology Clinic  [address] [place] [address]  PHONE: [phone]  Fax: [phone]        FUTURE VISIT INFORMATION                                                   Yaya Mg, : 1975 scheduled for future visit at [facility]    APPOINTMENT INFORMATION:    Date: 2022    Provider:  Emilia Mondragon PA    Reason for Visit/Diagnosis: Erectile dysfunction due to diseases classified elsewhere [N52.1]    REFERRAL INFORMATION:    Referring provider:  Selwyn Ashraf MD    Referring providers clinic:   [facility]    RECORDS REQUESTED FOR VISIT                                                     NOTES  STATUS/DETAILS   OFFICE NOTE from referring provider  yes, 2022, 12/15/2021 -- Selwyn Ashraf MD in  [facility]   MEDICATION LIST  yes     PRE-VISIT CHECKLIST      Record collection complete Yes   Appointment appropriately scheduled           (right time/right provider) Yes   Joint diagnostic appointment coordinated correctly          (ensure right order & amount of time) Yes   MyChart activation If no, please explain pending   Questionnaire complete If no, please explain pending

## 2022-03-08 ENCOUNTER — VIRTUAL VISIT (OUTPATIENT)
Dept: UROLOGY | Facility: CLINIC | Age: 47
End: 2022-03-08
Attending: FAMILY MEDICINE
Payer: COMMERCIAL

## 2022-03-08 DIAGNOSIS — N52.1 ERECTILE DYSFUNCTION DUE TO DISEASES CLASSIFIED ELSEWHERE: ICD-10-CM

## 2022-03-08 PROCEDURE — 99203 OFFICE O/P NEW LOW 30 MIN: CPT | Mod: 95 | Performed by: PHYSICIAN ASSISTANT

## 2022-03-08 NOTE — PROGRESS NOTES
Chief Complaint:   Erectile dysfunction          History of Present Illness:   Yaya Mg is a 46 year old male with a history of diabetes mellitus type I who presents for evaluation of erectile dysfunction.  Patient reports his ED has been consistently happening for over a year.  He is not able to achieve any erection at this time.  He has tried both Cialis, Viagra, and Levitra in the past with no success.  Patient also reports getting headaches with the oral PDE5 inhibitors.  He has also tried a TRINI without improvement.           Past Medical History:     Past Medical History:   Diagnosis Date     Anxiety      Depressive disorder      Hypertension      Type 1 diabetes (H)             Past Surgical History:     Past Surgical History:   Procedure Laterality Date     APPENDECTOMY  01/1985     BACK SURGERY  2010    lumbar calcium deposits removed     CATARACT EXTRACTION Right 2021            Medications     Current Outpatient Medications   Medication     ASPIRIN NOT PRESCRIBED (INTENTIONAL)     atorvastatin (LIPITOR) 20 MG tablet     Continuous Blood Gluc  (DEXCOM G6 ) JUSTEN     Continuous Blood Gluc Sensor (DEXCOM G6 SENSOR) MISC     Continuous Blood Gluc Transmit (DEXCOM G6 TRANSMITTER) MISC     escitalopram (LEXAPRO) 20 MG tablet     insulin aspart (NOVOLOG PEN) 100 UNIT/ML pen     insulin aspart (NOVOLOG VIAL) 100 UNITS/ML vial     insulin degludec (TRESIBA FLEXTOUCH) 100 UNIT/ML pen     Insulin Infusion Pump (T: SLIM X2 INS /CONTROL 7.4) JUSTEN     Insulin Infusion Pump Supplies (AUTOSOFT 90 INFUSION SET) MISC     Insulin Infusion Pump Supplies (T:SLIM X2 3ML CARTRIDGE) MISC     insulin pen needle (30G X 8 MM) 30G X 8 MM miscellaneous     insulin pen needle (31G X 8 MM) 31G X 8 MM miscellaneous     lisinopril (ZESTRIL) 5 MG tablet     No current facility-administered medications for this visit.            Family History:     Family History   Problem Relation Age of Onset      Hypertension Father      Hyperlipidemia Father      Obesity Father      Obesity Brother      Hypertension Brother      Depression Brother             Social History:     Social History     Socioeconomic History     Marital status:      Spouse name: Not on file     Number of children: Not on file     Years of education: Not on file     Highest education level: Not on file   Occupational History     Not on file   Tobacco Use     Smoking status: Never Smoker     Smokeless tobacco: Never Used   Vaping Use     Vaping Use: Never used   Substance and Sexual Activity     Alcohol use: Yes     Comment: socially     Drug use: Never     Sexual activity: Yes     Partners: Female   Other Topics Concern     Not on file   Social History Narrative     Not on file     Social Determinants of Health     Financial Resource Strain: Not on file   Food Insecurity: Not on file   Transportation Needs: Not on file   Physical Activity: Not on file   Stress: Not on file   Social Connections: Not on file   Intimate Partner Violence: Not on file   Housing Stability: Not on file            Allergies:   Patient has no known allergies.         Review of Systems:  From intake questionnaire   Negative 14 system review except as noted on HPI, nurse's note.         Physical Exam:   Patient is a 46 year old  male    General Appearance Adult: Alert, no acute distress, oriented  Lungs: no respiratory distress, or pursed lip breathing  Heart: No obvious jugular venous distension present  Skin: no suspicious lesions or rashes  Neuro: Alert, oriented, speech and mentation normal  Further examination is deferred due to the nature of our visit.        Labs and Pathology:    I personally reviewed all applicable laboratory data and went over findings with patient  Significant for:    CBC RESULTS:  Recent Labs   Lab Test 12/15/21  0813   WBC 8.7   HGB 15.2           BMP RESULTS:  Recent Labs   Lab Test 02/17/22  0824 12/15/21  0813   * 137    POTASSIUM 4.2 3.9   CHLORIDE 95 104   CO2 22 25   ANIONGAP 14 8   * 207*   BUN 21 15   CR 0.84 0.69   GFRESTIMATED >90 >90   SONIA 9.3 8.8       UA RESULTS:   No results for input(s): SG, URINEPH, NITRITE, RBCU, WBCU in the last 13075 hours.    PSA RESULTS  No results found for: PSA      Imaging:    I personally reviewed all applicable imaging and went over findings with patient.  Significant for:    No results found for this or any previous visit.             Assessment and Plan:     Assessment: 46 year old male with type I diabetes mellitus and erectile dysfunction.  Patient has not been able to achieve any erection for the past year.  He has tried multiple PDE5 inhibitors without success, and TRINI with no improvement.  Discussed ICI as an option, but patient declines.  Also discussed IPP and patient wishes to pursue surgical consultation.  Will refer to Dr. Hill or Dr. Jordan for surgical consideration.      Plan:  - Schedule in person appointment with Dr. Hill or Dr. Jordan for IPP consult.       SHAGUFTA Baer  Department of Urology

## 2022-03-08 NOTE — LETTER
3/8/2022       RE: Yaya Mg  [address] [facility] [facility] [address] [address]     Dear Colleague,    Thank you for referring your patient, Yaya Mg, to the [facility] [place] at [facility]. Please see a copy of my visit note below.    Yaya is a 46 year old who is being evaluated via a billable video visit.      How would you like to obtain your AVS? MyChart  If the video visit is dropped, the invitation should be resent by: Text to cell phone: [phone]  Will anyone else be joining your video visit? No      Video Start Time: 8:36 AM  Video-Visit Details    Type of service:  Video Visit    Video End Time:8:44 AM    Originating Location (pt. Location): Home    Distant Location (provider location):  [facility] [place]     Platform used for Video Visit: [employer]          Chief Complaint:   Erectile dysfunction          History of Present Illness:   Yaya Mg is a 46 year old male with a history of diabetes mellitus type I who presents for evaluation of erectile dysfunction.  Patient reports his ED has been consistently happening for over a year.  He is not able to achieve any erection at this time.  He has tried both Cialis, Viagra, and Levitra in the past with no success.  Patient also reports getting headaches with the oral PDE5 inhibitors.  He has also tried a TRINI without improvement.           Past Medical History:     Past Medical History:   Diagnosis Date     Anxiety      Depressive disorder      Hypertension      Type 1 diabetes (H)             Past Surgical History:     Past Surgical History:   Procedure Laterality Date     APPENDECTOMY  01/1985     BACK SURGERY  2010    lumbar calcium deposits removed     CATARACT EXTRACTION Right 2021            Medications     Current Outpatient Medications   Medication     ASPIRIN NOT PRESCRIBED (INTENTIONAL)     atorvastatin (LIPITOR) 20 MG  tablet     Continuous Blood Gluc  (DEXCOM G6 ) JUSTEN     Continuous Blood Gluc Sensor (DEXCOM G6 SENSOR) MISC     Continuous Blood Gluc Transmit (DEXCOM G6 TRANSMITTER) MISC     escitalopram (LEXAPRO) 20 MG tablet     insulin aspart (NOVOLOG PEN) 100 UNIT/ML pen     insulin aspart (NOVOLOG VIAL) 100 UNITS/ML vial     insulin degludec (TRESIBA FLEXTOUCH) 100 UNIT/ML pen     Insulin Infusion Pump (T: SLIM X2 INS /CONTROL 7.4) JUSTEN     Insulin Infusion Pump Supplies (AUTOSOFT 90 INFUSION SET) MISC     Insulin Infusion Pump Supplies (T:SLIM X2 3ML CARTRIDGE) MISC     insulin pen needle (30G X 8 MM) 30G X 8 MM miscellaneous     insulin pen needle (31G X 8 MM) 31G X 8 MM miscellaneous     lisinopril (ZESTRIL) 5 MG tablet     No current facility-administered medications for this visit.            Family History:     Family History   Problem Relation Age of Onset     Hypertension Father      Hyperlipidemia Father      Obesity Father      Obesity Brother      Hypertension Brother      Depression Brother             Social History:     Social History     Socioeconomic History     Marital status:      Spouse name: Not on file     Number of children: Not on file     Years of education: Not on file     Highest education level: Not on file   Occupational History     Not on file   Tobacco Use     Smoking status: Never Smoker     Smokeless tobacco: Never Used   Vaping Use     Vaping Use: Never used   Substance and Sexual Activity     Alcohol use: Yes     Comment: socially     Drug use: Never     Sexual activity: Yes     Partners: Female   Other Topics Concern     Not on file   Social History Narrative     Not on file     Social Determinants of Health     Financial Resource Strain: Not on file   Food Insecurity: Not on file   Transportation Needs: Not on file   Physical Activity: Not on file   Stress: Not on file   Social Connections: Not on file   Intimate Partner Violence: Not on file   Housing Stability:  Not on file            Allergies:   Patient has no known allergies.         Review of Systems:  From intake questionnaire   Negative 14 system review except as noted on HPI, nurse's note.         Physical Exam:   Patient is a 46 year old  male    General Appearance Adult: Alert, no acute distress, oriented  Lungs: no respiratory distress, or pursed lip breathing  Heart: No obvious jugular venous distension present  Skin: no suspicious lesions or rashes  Neuro: Alert, oriented, speech and mentation normal  Further examination is deferred due to the nature of our visit.        Labs and Pathology:    I personally reviewed all applicable laboratory data and went over findings with patient  Significant for:    CBC RESULTS:  Recent Labs   Lab Test 12/15/21  0813   WBC 8.7   HGB 15.2           BMP RESULTS:  Recent Labs   Lab Test 02/17/22  0824 12/15/21  0813   * 137   POTASSIUM 4.2 3.9   CHLORIDE 95 104   CO2 22 25   ANIONGAP 14 8   * 207*   BUN 21 15   CR 0.84 0.69   GFRESTIMATED >90 >90   SONIA 9.3 8.8       UA RESULTS:   No results for input(s): SG, URINEPH, NITRITE, RBCU, WBCU in the last 10882 hours.    PSA RESULTS  No results found for: PSA      Imaging:    I personally reviewed all applicable imaging and went over findings with patient.  Significant for:    No results found for this or any previous visit.             Assessment and Plan:     Assessment: 46 year old male with type I diabetes mellitus and erectile dysfunction.  Patient has not been able to achieve any erection for the past year.  He has tried multiple PDE5 inhibitors without success, and TRINI with no improvement.  Discussed ICI as an option, but patient declines.  Also discussed IPP and patient wishes to pursue surgical consultation.  Will refer to Dr. Hill or Dr. Jordan for surgical consideration.      Plan:  - Schedule in person appointment with Dr. Hill or Dr. Jordan for IPP consult.       SHAGUFTA Baer  Department of  Urology

## 2022-03-08 NOTE — PROGRESS NOTES
Yaya is a 46 year old who is being evaluated via a billable video visit.      How would you like to obtain your AVS? MyChart  If the video visit is dropped, the invitation should be resent by: Text to cell phone: [phone]  Will anyone else be joining your video visit? No      Video Start Time: 8:36 AM  Video-Visit Details    Type of service:  Video Visit    Video End Time:8:44 AM    Originating Location (pt. Location): Home    Distant Location (provider location):  [facility] UROLOGY [facility]     Platform used for Video Visit: [employer]

## 2022-03-08 NOTE — PATIENT INSTRUCTIONS
UROLOGY CLINIC VISIT PATIENT INSTRUCTIONS    - Schedule in person appointment with Dr. Hill or Dr. Jordan for IPP consult.     If you have any issues, questions or concerns in the meantime, do not hesitate to contact us at [phone] or via [employer].     It was a pleasure meeting with you today.  Thank you for allowing me and my team the privilege of caring for you today.  YOU are the reason we are here, and I truly hope we provided you with the excellent service you deserve.  Please let us know if there is anything else we can do for you so that we can be sure you are leaving completely satisfied with your care experience.    Emilia Mondragon PA-C  Department of Urology

## 2022-03-19 ENCOUNTER — HEALTH MAINTENANCE LETTER (OUTPATIENT)
Age: 47
End: 2022-03-19

## 2022-03-23 ENCOUNTER — TELEPHONE (OUTPATIENT)
Dept: EDUCATION SERVICES | Facility: OTHER | Age: 47
End: 2022-03-23
Payer: COMMERCIAL

## 2022-03-23 NOTE — TELEPHONE ENCOUNTER
I called the pt no answer/VM. Need to find out which [facility] pharmacy to send to.

## 2022-03-23 NOTE — TELEPHONE ENCOUNTER
Pt called he recently started his insulin pump and needs to get his supplies sent to [facility] pharmacy. I called the pt and left a message to call back to get more pharmacy information..

## 2022-03-24 DIAGNOSIS — E10.9 TYPE 1 DIABETES MELLITUS WITHOUT COMPLICATION (H): Primary | ICD-10-CM

## 2022-03-24 RX ORDER — INFUSION SET FOR INSULIN PUMP
1 INFUSION SETS-PARAPHERNALIA MISCELLANEOUS
Qty: 15 EACH | Refills: 11 | Status: SHIPPED | OUTPATIENT
Start: 2022-03-24 | End: 2022-03-25

## 2022-03-24 RX ORDER — INSULIN PUMP CARTRIDGE
1 CARTRIDGE (EA) SUBCUTANEOUS
Qty: 15 EACH | Refills: 11 | Status: SHIPPED | OUTPATIENT
Start: 2022-03-24 | End: 2022-03-29

## 2022-03-24 NOTE — PROGRESS NOTES
Pump supplies sent to [facility] mail order pharmacy.     Evelin GUZMAN FNP-BC  Diabetes and Wound Care

## 2022-03-25 DIAGNOSIS — E10.9 TYPE 1 DIABETES MELLITUS WITHOUT COMPLICATION (H): ICD-10-CM

## 2022-03-25 RX ORDER — INFUSION SET FOR INSULIN PUMP
INFUSION SETS-PARAPHERNALIA MISCELLANEOUS
Refills: 3 | OUTPATIENT
Start: 2022-03-25

## 2022-03-25 RX ORDER — INFUSION SET FOR INSULIN PUMP
1 INFUSION SETS-PARAPHERNALIA MISCELLANEOUS
Qty: 15 EACH | Refills: 11 | Status: SHIPPED | OUTPATIENT
Start: 2022-03-25 | End: 2022-03-29

## 2022-03-25 NOTE — TELEPHONE ENCOUNTER
Duplicate refill request, please disregard.  Rx was sent to [facility] yesterday.

## 2022-03-25 NOTE — TELEPHONE ENCOUNTER
Two more Rx requests came in, can we send in case this pharmacy is related to [facility]?

## 2022-03-29 DIAGNOSIS — F41.8 DEPRESSION WITH ANXIETY: ICD-10-CM

## 2022-03-29 DIAGNOSIS — E78.2 MIXED HYPERLIPIDEMIA: ICD-10-CM

## 2022-03-29 DIAGNOSIS — E10.9 TYPE 1 DIABETES MELLITUS WITHOUT COMPLICATION (H): ICD-10-CM

## 2022-03-29 RX ORDER — ATORVASTATIN CALCIUM 20 MG/1
20 TABLET, FILM COATED ORAL DAILY
Qty: 90 TABLET | Refills: 3 | Status: SHIPPED | OUTPATIENT
Start: 2022-03-29 | End: 2022-05-31

## 2022-03-29 RX ORDER — INSULIN PUMP CARTRIDGE
1 CARTRIDGE (EA) SUBCUTANEOUS
Qty: 45 EACH | Refills: 3 | Status: SHIPPED | OUTPATIENT
Start: 2022-03-29 | End: 2023-06-05

## 2022-03-29 RX ORDER — INFUSION SET FOR INSULIN PUMP
1 INFUSION SETS-PARAPHERNALIA MISCELLANEOUS
Qty: 45 EACH | Refills: 4 | Status: SHIPPED | OUTPATIENT
Start: 2022-03-29 | End: 2023-06-05

## 2022-03-29 RX ORDER — LISINOPRIL 5 MG/1
5 TABLET ORAL DAILY
Qty: 90 TABLET | Refills: 3 | Status: SHIPPED | OUTPATIENT
Start: 2022-03-29 | End: 2023-02-06

## 2022-03-29 RX ORDER — PROCHLORPERAZINE 25 MG/1
1 SUPPOSITORY RECTAL
Qty: 1 EACH | Refills: 4 | Status: SHIPPED | OUTPATIENT
Start: 2022-03-29 | End: 2023-03-14

## 2022-03-29 RX ORDER — PROCHLORPERAZINE 25 MG/1
1 SUPPOSITORY RECTAL
Qty: 9 EACH | Refills: 4 | Status: SHIPPED | OUTPATIENT
Start: 2022-03-29 | End: 2023-01-11

## 2022-03-29 RX ORDER — ESCITALOPRAM OXALATE 20 MG/1
20 TABLET ORAL DAILY
Qty: 90 TABLET | Refills: 3 | Status: SHIPPED | OUTPATIENT
Start: 2022-03-29 | End: 2023-01-26

## 2022-03-29 NOTE — TELEPHONE ENCOUNTER
Insulin Infusion Pump Supplies (AUTOSOFT 90 INFUSION SET) MISC 45 each 4 3/29/2022  No   Sig - Route: Inject 1 each Subcutaneous every 48 hours - Subcutaneous   Sent to pharmacy as: AutoSoft 90 Infusion Set   Class: E-Prescribe   Order: [id number]   E-Prescribing Status: Receipt confirmed by pharmacy (3/29/2022  9:51 AM CDT)

## 2022-03-29 NOTE — TELEPHONE ENCOUNTER
"[phone]  Ref: [id number]  [facility]      23\"-6 mm, blue grey or pink  23\"-9 mm  Blue or grey  43\"-6  mm  Grey  43\"-9  mm grey    Need's the strength on the script.  Has been sending faxes.    autosoft infusion set  "

## 2022-04-14 ENCOUNTER — LAB (OUTPATIENT)
Dept: LAB | Facility: OTHER | Age: 47
End: 2022-04-14
Payer: COMMERCIAL

## 2022-04-14 ENCOUNTER — MYC MEDICAL ADVICE (OUTPATIENT)
Dept: FAMILY MEDICINE | Facility: OTHER | Age: 47
End: 2022-04-14

## 2022-04-14 DIAGNOSIS — E78.2 MIXED HYPERLIPIDEMIA: ICD-10-CM

## 2022-04-14 DIAGNOSIS — E10.9 TYPE 1 DIABETES MELLITUS WITHOUT COMPLICATION (H): ICD-10-CM

## 2022-04-14 LAB
ALBUMIN SERPL-MCNC: 3.8 G/DL (ref 3.4–5)
ALP SERPL-CCNC: 131 U/L (ref 40–150)
ALT SERPL W P-5'-P-CCNC: 38 U/L (ref 0–70)
AST SERPL W P-5'-P-CCNC: 20 U/L (ref 0–45)
BILIRUB DIRECT SERPL-MCNC: 0.2 MG/DL (ref 0–0.2)
BILIRUB SERPL-MCNC: 0.8 MG/DL (ref 0.2–1.3)
CHOLEST SERPL-MCNC: 136 MG/DL
FASTING STATUS PATIENT QL REPORTED: YES
HDLC SERPL-MCNC: 60 MG/DL
LDLC SERPL CALC-MCNC: 57 MG/DL
NONHDLC SERPL-MCNC: 76 MG/DL
PROT SERPL-MCNC: 7.1 G/DL (ref 6.8–8.8)
TRIGL SERPL-MCNC: 97 MG/DL

## 2022-04-14 PROCEDURE — 80076 HEPATIC FUNCTION PANEL: CPT

## 2022-04-14 PROCEDURE — 80061 LIPID PANEL: CPT

## 2022-04-14 PROCEDURE — 36415 COLL VENOUS BLD VENIPUNCTURE: CPT

## 2022-04-26 ENCOUNTER — PRE VISIT (OUTPATIENT)
Dept: UROLOGY | Facility: CLINIC | Age: 47
End: 2022-04-26
Payer: COMMERCIAL

## 2022-04-26 NOTE — TELEPHONE ENCOUNTER
Reason for visit: Consult     Relevant information: discuss IPP insertion    Records/imaging/labs/orders: in EPIC    Pt called: no    At Rooming: normal

## 2022-05-13 ENCOUNTER — MYC MEDICAL ADVICE (OUTPATIENT)
Dept: FAMILY MEDICINE | Facility: OTHER | Age: 47
End: 2022-05-13
Payer: COMMERCIAL

## 2022-05-13 ENCOUNTER — TELEPHONE (OUTPATIENT)
Dept: FAMILY MEDICINE | Facility: OTHER | Age: 47
End: 2022-05-13
Payer: COMMERCIAL

## 2022-05-13 NOTE — TELEPHONE ENCOUNTER
Next 5 appointments (look out 90 days)      May 18, 2022 10:15 AM  (Arrive by 10:00 AM)  SHORT with Selwyn Ashraf MD  [facility] [place] ([facility] [place] ) [address]  [place] [address]  [phone]

## 2022-05-13 NOTE — TELEPHONE ENCOUNTER
3:30 PM    Reason for Call: Phone Call    Description: pt called back to schedule an appointment that Andriy said he could come in next week. Please call pt    Was an appointment offered for this call? No  If yes : Appointment type              Date    Preferred method for responding to this message: Telephone Call  What is your phone number ? [phone]    If we cannot reach you directly, may we leave a detailed response at the number you provided? Yes    Can this message wait until your PCP/provider returns, if available today? Jessica Velazquez

## 2022-05-13 NOTE — TELEPHONE ENCOUNTER
Next 5 appointments (look out 90 days)      May 18, 2022 10:15 AM  (Arrive by 10:00 AM)  SHORT with Selwyn Ashraf MD  [facility] [place] ([facility] [place] ) [address]  [place] [address]  [phone]

## 2022-05-18 ENCOUNTER — LAB (OUTPATIENT)
Dept: LAB | Facility: OTHER | Age: 47
End: 2022-05-18
Attending: FAMILY MEDICINE
Payer: COMMERCIAL

## 2022-05-18 ENCOUNTER — OFFICE VISIT (OUTPATIENT)
Dept: FAMILY MEDICINE | Facility: OTHER | Age: 47
End: 2022-05-18
Attending: FAMILY MEDICINE
Payer: COMMERCIAL

## 2022-05-18 VITALS
DIASTOLIC BLOOD PRESSURE: 60 MMHG | BODY MASS INDEX: 35.73 KG/M2 | HEART RATE: 75 BPM | SYSTOLIC BLOOD PRESSURE: 102 MMHG | TEMPERATURE: 96.7 F | WEIGHT: 235 LBS | OXYGEN SATURATION: 97 % | RESPIRATION RATE: 16 BRPM

## 2022-05-18 DIAGNOSIS — K62.5 BRBPR (BRIGHT RED BLOOD PER RECTUM): ICD-10-CM

## 2022-05-18 DIAGNOSIS — E10.9 TYPE 1 DIABETES MELLITUS WITHOUT COMPLICATION (H): ICD-10-CM

## 2022-05-18 DIAGNOSIS — Z80.0 FH: COLON CANCER IN RELATIVE DIAGNOSED AT >50 YEARS OLD: ICD-10-CM

## 2022-05-18 DIAGNOSIS — E78.2 MIXED HYPERLIPIDEMIA: ICD-10-CM

## 2022-05-18 DIAGNOSIS — K62.5 BRBPR (BRIGHT RED BLOOD PER RECTUM): Primary | ICD-10-CM

## 2022-05-18 LAB
BASOPHILS # BLD AUTO: 0 10E3/UL (ref 0–0.2)
BASOPHILS NFR BLD AUTO: 1 %
EOSINOPHIL # BLD AUTO: 0.1 10E3/UL (ref 0–0.7)
EOSINOPHIL NFR BLD AUTO: 2 %
ERYTHROCYTE [DISTWIDTH] IN BLOOD BY AUTOMATED COUNT: 12.6 % (ref 10–15)
EST. AVERAGE GLUCOSE BLD GHB EST-MCNC: 128 MG/DL
HBA1C MFR BLD: 6.1 % (ref 0–5.6)
HCT VFR BLD AUTO: 41 % (ref 40–53)
HGB BLD-MCNC: 14.5 G/DL (ref 13.3–17.7)
IMM GRANULOCYTES # BLD: 0 10E3/UL
IMM GRANULOCYTES NFR BLD: 0 %
LYMPHOCYTES # BLD AUTO: 0.9 10E3/UL (ref 0.8–5.3)
LYMPHOCYTES NFR BLD AUTO: 16 %
MCH RBC QN AUTO: 29.8 PG (ref 26.5–33)
MCHC RBC AUTO-ENTMCNC: 35.4 G/DL (ref 31.5–36.5)
MCV RBC AUTO: 84 FL (ref 78–100)
MONOCYTES # BLD AUTO: 0.7 10E3/UL (ref 0–1.3)
MONOCYTES NFR BLD AUTO: 12 %
NEUTROPHILS # BLD AUTO: 4 10E3/UL (ref 1.6–8.3)
NEUTROPHILS NFR BLD AUTO: 69 %
NRBC # BLD AUTO: 0 10E3/UL
NRBC BLD AUTO-RTO: 0 /100
PLATELET # BLD AUTO: 214 10E3/UL (ref 150–450)
RBC # BLD AUTO: 4.87 10E6/UL (ref 4.4–5.9)
WBC # BLD AUTO: 5.8 10E3/UL (ref 4–11)

## 2022-05-18 PROCEDURE — 99214 OFFICE O/P EST MOD 30 MIN: CPT | Performed by: FAMILY MEDICINE

## 2022-05-18 PROCEDURE — 36415 COLL VENOUS BLD VENIPUNCTURE: CPT | Performed by: FAMILY MEDICINE

## 2022-05-18 PROCEDURE — 85025 COMPLETE CBC W/AUTO DIFF WBC: CPT | Performed by: FAMILY MEDICINE

## 2022-05-18 PROCEDURE — 83036 HEMOGLOBIN GLYCOSYLATED A1C: CPT | Performed by: FAMILY MEDICINE

## 2022-05-18 ASSESSMENT — PAIN SCALES - GENERAL: PAINLEVEL: MODERATE PAIN (4)

## 2022-05-18 NOTE — NURSING NOTE
"Chief Complaint   Patient presents with     Recheck Medication       Initial /60 (BP Location: Left arm, Patient Position: Sitting, Cuff Size: Adult Large)   Pulse 75   Temp (!) 96.7  F (35.9  C) (Tympanic)   Resp 16   Wt 106.6 kg (235 lb)   SpO2 97%   BMI 35.73 kg/m   Estimated body mass index is 35.73 kg/m  as calculated from the following:    Height as of 12/30/21: 1.727 m (5' 8\").    Weight as of this encounter: 106.6 kg (235 lb).  Medication Reconciliation: complete  Kali Paredes LPN  "

## 2022-05-18 NOTE — PROGRESS NOTES
"  Assessment & Plan     BRBPR (bright red blood per rectum)  Probably benign but needs and due for colonoscopy. Has FH also.  Will refer. Keep off motrin and keep stools soft.   - CBC with Platelets & Differential; Future  - Adult General Surg Referral    Mixed hyperlipidemia  On statin. Doing ok. NO issues with statin. I do not feel statin causing bleeding like he thought REcheck at next visit in Sept    Type 1 diabetes mellitus without complication (H)  BS MUCH better. Keep up good job.  Follow-up in Sept  - Hemoglobin A1c; Future    FH: colon cancer in relative diagnosed at >50 years old  As above.   - Adult General Surg Referral             BMI:   Estimated body mass index is 35.73 kg/m  as calculated from the following:    Height as of 12/30/21: 1.727 m (5' 8\").    Weight as of this encounter: 106.6 kg (235 lb).           No follow-ups on file.    Selwyn Ashraf MD  [facility] - SANJEEV Rios is a 47 year old who presents for the following health issues     HPI     Concern - Blood in stool  Onset: 5 days  Description: a lot of blood  Intensity: moderate, severe  Progression of Symptoms:  improving  Accompanying Signs & Symptoms: none  Previous history of similar problem: none  Precipitating factors:        Worsened by: taking motrin  Alleviating factors:        Improved by: stopping motrin  Therapies tried and outcome:  none     5-6 days - better- no blood if no BM- comes with BM  Better with decrease in or stopping Motrin  No real hard stools  No hemorrhoid   No rectal pain     NO colonoscopy   FH colon cancer - PGM      Diabetes Follow-up    How often are you checking your blood sugar? Continuous glucose monitor  What time of day are you checking your blood sugars (select all that apply)?  Before and after meals  Have you had any blood sugars above 200?  No  Have you had any blood sugars below 70?  Yes 48    What symptoms do you notice when your blood sugar is low?  Dizzy, " Confusion and Other: sweaty    What concerns do you have today about your diabetes? None     Do you have any of these symptoms? (Select all that apply)  No numbness or tingling in feet.  No redness, sores or blisters on feet.  No complaints of excessive thirst.  No reports of blurry vision.  No significant changes to weight.    Have you had a diabetic eye exam in the last 12 months? Yes- Date of last eye exam: 6/2021,  Location: [place]        BP Readings from Last 2 Encounters:   03/04/22 113/70   12/30/21 118/80     Hemoglobin A1C (%)   Date Value   02/17/2022 8.9 (H)   12/15/2021 8.6 (H)     LDL Cholesterol Calculated (mg/dL)   Date Value   04/14/2022 57   02/17/2022 166 (H)         Review of Systems   Constitutional, HEENT, cardiovascular, pulmonary, gi and gu systems are negative, except as otherwise noted.      Objective    /60 (BP Location: Left arm, Patient Position: Sitting, Cuff Size: Adult Large)   Pulse 75   Temp (!) 96.7  F (35.9  C) (Tympanic)   Resp 16   Wt 106.6 kg (235 lb)   SpO2 97%   BMI 35.73 kg/m    Body mass index is 35.73 kg/m .  Physical Exam   GENERAL: healthy, alert and no distress  NECK: no adenopathy, no asymmetry, masses, or scars and thyroid normal to palpation  RESP: lungs clear to auscultation - no rales, rhonchi or wheezes  CV: regular rate and rhythm, normal S1 S2, no S3 or S4, no murmur, click or rub, no peripheral edema and peripheral pulses strong  ABDOMEN: soft, nontender, no hepatosplenomegaly, no masses and bowel sounds normal  RECTAL (male): normal sphincter tone, no rectal masses, prostate normal size, smooth, nontender without nodules or masses-- redness and irritation at 12 o clock with pt standing and bending over   Diabetic foot exam: normal DP and PT pulses, no trophic changes or ulcerative lesions, normal sensory exam and normal monofilament exam        Results for orders placed or performed in visit on 05/18/22   Hemoglobin A1c     Status: Abnormal    Result Value Ref Range    Estimated Average Glucose 128 mg/dL    Hemoglobin A1C 6.1 (H) 0.0 - 5.6 %   CBC with platelets and differential     Status: None   Result Value Ref Range    WBC Count 5.8 4.0 - 11.0 10e3/uL    RBC Count 4.87 4.40 - 5.90 10e6/uL    Hemoglobin 14.5 13.3 - 17.7 g/dL    Hematocrit 41.0 40.0 - 53.0 %    MCV 84 78 - 100 fL    MCH 29.8 26.5 - 33.0 pg    MCHC 35.4 31.5 - 36.5 g/dL    RDW 12.6 10.0 - 15.0 %    Platelet Count 214 150 - 450 10e3/uL    % Neutrophils 69 %    % Lymphocytes 16 %    % Monocytes 12 %    % Eosinophils 2 %    % Basophils 1 %    % Immature Granulocytes 0 %    NRBCs per 100 WBC 0 <1 /100    Absolute Neutrophils 4.0 1.6 - 8.3 10e3/uL    Absolute Lymphocytes 0.9 0.8 - 5.3 10e3/uL    Absolute Monocytes 0.7 0.0 - 1.3 10e3/uL    Absolute Eosinophils 0.1 0.0 - 0.7 10e3/uL    Absolute Basophils 0.0 0.0 - 0.2 10e3/uL    Absolute Immature Granulocytes 0.0 <=0.4 10e3/uL    Absolute NRBCs 0.0 10e3/uL   CBC with Platelets & Differential     Status: None    Narrative    The following orders were created for panel order CBC with Platelets & Differential.  Procedure                               Abnormality         Status                     ---------                               -----------         ------                     CBC with platelets and d...[[id number]]                      Final result                 Please view results for these tests on the individual orders.

## 2022-05-24 ENCOUNTER — APPOINTMENT (OUTPATIENT)
Dept: GENERAL RADIOLOGY | Facility: OTHER | Age: 47
End: 2022-05-24
Attending: FAMILY MEDICINE
Payer: COMMERCIAL

## 2022-05-24 ENCOUNTER — OFFICE VISIT (OUTPATIENT)
Dept: FAMILY MEDICINE | Facility: OTHER | Age: 47
End: 2022-05-24
Attending: FAMILY MEDICINE
Payer: COMMERCIAL

## 2022-05-24 ENCOUNTER — MYC MEDICAL ADVICE (OUTPATIENT)
Dept: FAMILY MEDICINE | Facility: OTHER | Age: 47
End: 2022-05-24
Payer: COMMERCIAL

## 2022-05-24 ENCOUNTER — LAB (OUTPATIENT)
Dept: LAB | Facility: OTHER | Age: 47
End: 2022-05-24
Attending: FAMILY MEDICINE
Payer: COMMERCIAL

## 2022-05-24 VITALS
WEIGHT: 230 LBS | RESPIRATION RATE: 16 BRPM | OXYGEN SATURATION: 98 % | SYSTOLIC BLOOD PRESSURE: 112 MMHG | TEMPERATURE: 97.5 F | DIASTOLIC BLOOD PRESSURE: 76 MMHG | BODY MASS INDEX: 34.97 KG/M2 | HEART RATE: 76 BPM

## 2022-05-24 DIAGNOSIS — E78.2 MIXED HYPERLIPIDEMIA: ICD-10-CM

## 2022-05-24 DIAGNOSIS — E10.9 TYPE 1 DIABETES MELLITUS WITHOUT COMPLICATION (H): ICD-10-CM

## 2022-05-24 DIAGNOSIS — R06.09 DOE (DYSPNEA ON EXERTION): Primary | ICD-10-CM

## 2022-05-24 DIAGNOSIS — R06.09 DOE (DYSPNEA ON EXERTION): ICD-10-CM

## 2022-05-24 LAB
ALBUMIN SERPL-MCNC: 4.1 G/DL (ref 3.4–5)
ALP SERPL-CCNC: 117 U/L (ref 40–150)
ALT SERPL W P-5'-P-CCNC: 42 U/L (ref 0–70)
ANION GAP SERPL CALCULATED.3IONS-SCNC: 5 MMOL/L (ref 3–14)
AST SERPL W P-5'-P-CCNC: 20 U/L (ref 0–45)
BASOPHILS # BLD AUTO: 0 10E3/UL (ref 0–0.2)
BASOPHILS NFR BLD AUTO: 1 %
BILIRUB SERPL-MCNC: 0.8 MG/DL (ref 0.2–1.3)
BUN SERPL-MCNC: 16 MG/DL (ref 7–30)
CALCIUM SERPL-MCNC: 9.2 MG/DL (ref 8.5–10.1)
CHLORIDE BLD-SCNC: 103 MMOL/L (ref 94–109)
CO2 SERPL-SCNC: 26 MMOL/L (ref 20–32)
CREAT SERPL-MCNC: 0.74 MG/DL (ref 0.66–1.25)
CRP SERPL-MCNC: <2.9 MG/L (ref 0–8)
D DIMER PPP FEU-MCNC: <0.3 UG/ML FEU (ref 0–0.5)
EOSINOPHIL # BLD AUTO: 0.1 10E3/UL (ref 0–0.7)
EOSINOPHIL NFR BLD AUTO: 2 %
ERYTHROCYTE [DISTWIDTH] IN BLOOD BY AUTOMATED COUNT: 12.5 % (ref 10–15)
GFR SERPL CREATININE-BSD FRML MDRD: >90 ML/MIN/1.73M2
GLUCOSE BLD-MCNC: 252 MG/DL (ref 70–99)
HCT VFR BLD AUTO: 40.7 % (ref 40–53)
HGB BLD-MCNC: 14.9 G/DL (ref 13.3–17.7)
IMM GRANULOCYTES # BLD: 0 10E3/UL
IMM GRANULOCYTES NFR BLD: 0 %
LYMPHOCYTES # BLD AUTO: 1 10E3/UL (ref 0.8–5.3)
LYMPHOCYTES NFR BLD AUTO: 23 %
MCH RBC QN AUTO: 30.1 PG (ref 26.5–33)
MCHC RBC AUTO-ENTMCNC: 36.6 G/DL (ref 31.5–36.5)
MCV RBC AUTO: 82 FL (ref 78–100)
MONOCYTES # BLD AUTO: 0.5 10E3/UL (ref 0–1.3)
MONOCYTES NFR BLD AUTO: 11 %
NEUTROPHILS # BLD AUTO: 2.7 10E3/UL (ref 1.6–8.3)
NEUTROPHILS NFR BLD AUTO: 63 %
NRBC # BLD AUTO: 0 10E3/UL
NRBC BLD AUTO-RTO: 0 /100
NT-PROBNP SERPL-MCNC: 35 PG/ML (ref 0–450)
PLATELET # BLD AUTO: 226 10E3/UL (ref 150–450)
POTASSIUM BLD-SCNC: 4.2 MMOL/L (ref 3.4–5.3)
PROT SERPL-MCNC: 7.4 G/DL (ref 6.8–8.8)
RBC # BLD AUTO: 4.95 10E6/UL (ref 4.4–5.9)
SODIUM SERPL-SCNC: 134 MMOL/L (ref 133–144)
WBC # BLD AUTO: 4.3 10E3/UL (ref 4–11)

## 2022-05-24 PROCEDURE — 80053 COMPREHEN METABOLIC PANEL: CPT

## 2022-05-24 PROCEDURE — 93000 ELECTROCARDIOGRAM COMPLETE: CPT | Performed by: INTERNAL MEDICINE

## 2022-05-24 PROCEDURE — 83880 ASSAY OF NATRIURETIC PEPTIDE: CPT

## 2022-05-24 PROCEDURE — 36415 COLL VENOUS BLD VENIPUNCTURE: CPT

## 2022-05-24 PROCEDURE — 99214 OFFICE O/P EST MOD 30 MIN: CPT | Performed by: FAMILY MEDICINE

## 2022-05-24 PROCEDURE — 71046 X-RAY EXAM CHEST 2 VIEWS: CPT | Mod: TC | Performed by: RADIOLOGY

## 2022-05-24 PROCEDURE — 85379 FIBRIN DEGRADATION QUANT: CPT

## 2022-05-24 PROCEDURE — 85025 COMPLETE CBC W/AUTO DIFF WBC: CPT

## 2022-05-24 PROCEDURE — 86140 C-REACTIVE PROTEIN: CPT

## 2022-05-24 RX ORDER — ASPIRIN 81 MG/1
81 TABLET, CHEWABLE ORAL DAILY
Qty: 90 TABLET | Refills: 3 | COMMUNITY
Start: 2022-05-24 | End: 2022-12-30

## 2022-05-24 ASSESSMENT — PAIN SCALES - GENERAL: PAINLEVEL: NO PAIN (0)

## 2022-05-24 NOTE — PROGRESS NOTES
"  Assessment & Plan       ICD-10-CM    1. MCCONNELL (dyspnea on exertion)  R06.00 CBC with Platelets & Differential     Comprehensive metabolic panel     CRP inflammation     XR Chest 2 Views     EKG 12-lead complete w/read - Clinics     N terminal pro BNP outpatient     D dimer quantitative     NM MPI Treadmill     aspirin (ASA) 81 MG chewable tablet   2. Type 1 diabetes mellitus without complication (H)  E10.9 NM MPI Treadmill     aspirin (ASA) 81 MG chewable tablet   3. Mixed hyperlipidemia  E78.2 NM MPI Treadmill     aspirin (ASA) 81 MG chewable tablet     Very concerning for CAD/Crescendo Anginal Equivalent. Pt is in overall good shape and was working construction several years ago. Definitely a change from baseline -- will order urgent or emergent GXT .  Pt to take it easy.  Start Baby ASA.  Do not try to overexert or stress his heart til get stress test. Continue current medications and behavioral changes.   Symptomatic treatment was discussed along when patient should call and/or come back into the clinic or go to ER/Urgent care. All questions answered.   Pt was told if gets sx at rest or does not go away.                BMI:   Estimated body mass index is 34.97 kg/m  as calculated from the following:    Height as of 12/30/21: 1.727 m (5' 8\").    Weight as of this encounter: 104.3 kg (230 lb).           No follow-ups on file.    Selwyn Ashraf MD  [facility] - [place]JEE[place] Rios is a 47 year old who presents for the following health issues     HPI     Concern - cough with activity  Onset: months- 2-3 months --- getting some worse   Description: cough and sweating  Intensity: moderate  Progression of Symptoms:  It is occurring more frequently   Accompanying Signs & Symptoms: none  Previous history of similar problem: no  Precipitating factors:        Worsened by: activity  Alleviating factors:        Improved by: nothing  Therapies tried and outcome: None  MCCONNELL /SOB with exertion   Feels " dizzy / increase sweating - with minimal exertion   Nothing to do with high or low BS-- now has Dexcom and has pump  Will get into coughing spells.   No wheezing   No smoking / no caustic inhalants   Works at [employer]     No seasonal allergies or asthma  No wheezing     No FH of CAD       Can walk few hundred yards at most before has to sit down to breath. Progressively getting worse he feels   No issue at rest         Review of Systems   Constitutional, HEENT, cardiovascular, pulmonary, GI, , musculoskeletal, neuro, skin, endocrine and psych systems are negative, except as otherwise noted.      Objective    /76 (BP Location: Right arm, Patient Position: Sitting, Cuff Size: Adult Large)   Pulse 76   Temp 97.5  F (36.4  C) (Tympanic)   Resp 16   Wt 104.3 kg (230 lb)   SpO2 98%   BMI 34.97 kg/m    Body mass index is 34.97 kg/m .  Physical Exam   GENERAL: healthy, alert and no distress  NECK: no adenopathy, no asymmetry, masses, or scars and thyroid normal to palpation  RESP: lungs clear to auscultation - no rales, rhonchi or wheezes  CV: regular rate and rhythm, normal S1 S2, no S3 or S4, no murmur, click or rub, no peripheral edema and peripheral pulses strong  ABDOMEN: soft, nontender, no hepatosplenomegaly, no masses and bowel sounds normal  MS: no gross musculoskeletal defects noted, no edema    EKG - Reviewed and interpreted by me appears normal, NSR, normal axis, normal intervals, no acute ST/T changes c/w ischemia, no LVH by voltage criteria, unchanged from previous tracings    Results for orders placed or performed in visit on 05/24/22   XR Chest 2 Views     Status: None    Narrative    XR CHEST 2 VW    HISTORY: 47 years Male MCCONNELL (dyspnea on exertion)    COMPARISON: None    TECHNIQUE: 2 views of the chest were obtained.    FINDINGS: Two views of the chest were obtained. Heart size and  pulmonary vascularity are within normal limits, lungs are clear on  both views. No consolidating air  space opacities are present.          Impression    IMPRESSION: Clear chest.    SERGIO JOHNSON MD         SYSTEM ID:  [id number]   Results for orders placed or performed in visit on 05/24/22   D dimer quantitative     Status: Normal   Result Value Ref Range    D-Dimer Quantitative <0.30 0.00 - 0.50 ug/mL FEU    Narrative    This D-dimer assay is intended for use in conjunction with a clinical pretest probability assessment model to exclude pulmonary embolism (PE) and deep venous thrombosis (DVT) in outpatients suspected of PE or DVT. The cut-off value is 0.50 ug/mL FEU.   N terminal pro BNP outpatient     Status: Normal   Result Value Ref Range    N Terminal Pro BNP Outpatient 35 0 - 450 pg/mL   CRP inflammation     Status: Normal   Result Value Ref Range    CRP Inflammation <2.9 0.0 - 8.0 mg/L   Comprehensive metabolic panel     Status: Abnormal   Result Value Ref Range    Sodium 134 133 - 144 mmol/L    Potassium 4.2 3.4 - 5.3 mmol/L    Chloride 103 94 - 109 mmol/L    Carbon Dioxide (CO2) 26 20 - 32 mmol/L    Anion Gap 5 3 - 14 mmol/L    Urea Nitrogen 16 7 - 30 mg/dL    Creatinine 0.74 0.66 - 1.25 mg/dL    Calcium 9.2 8.5 - 10.1 mg/dL    Glucose 252 (H) 70 - 99 mg/dL    Alkaline Phosphatase 117 40 - 150 U/L    AST 20 0 - 45 U/L    ALT 42 0 - 70 U/L    Protein Total 7.4 6.8 - 8.8 g/dL    Albumin 4.1 3.4 - 5.0 g/dL    Bilirubin Total 0.8 0.2 - 1.3 mg/dL    GFR Estimate >90 >60 mL/min/1.73m2   CBC with platelets and differential     Status: Abnormal   Result Value Ref Range    WBC Count 4.3 4.0 - 11.0 10e3/uL    RBC Count 4.95 4.40 - 5.90 10e6/uL    Hemoglobin 14.9 13.3 - 17.7 g/dL    Hematocrit 40.7 40.0 - 53.0 %    MCV 82 78 - 100 fL    MCH 30.1 26.5 - 33.0 pg    MCHC 36.6 (H) 31.5 - 36.5 g/dL    RDW 12.5 10.0 - 15.0 %    Platelet Count 226 150 - 450 10e3/uL    % Neutrophils 63 %    % Lymphocytes 23 %    % Monocytes 11 %    % Eosinophils 2 %    % Basophils 1 %    % Immature Granulocytes 0 %    NRBCs per 100 WBC 0  <1 /100    Absolute Neutrophils 2.7 1.6 - 8.3 10e3/uL    Absolute Lymphocytes 1.0 0.8 - 5.3 10e3/uL    Absolute Monocytes 0.5 0.0 - 1.3 10e3/uL    Absolute Eosinophils 0.1 0.0 - 0.7 10e3/uL    Absolute Basophils 0.0 0.0 - 0.2 10e3/uL    Absolute Immature Granulocytes 0.0 <=0.4 10e3/uL    Absolute NRBCs 0.0 10e3/uL   CBC with Platelets & Differential     Status: Abnormal    Narrative    The following orders were created for panel order CBC with Platelets & Differential.  Procedure                               Abnormality         Status                     ---------                               -----------         ------                     CBC with platelets and d...[[id number]]  Abnormal            Final result                 Please view results for these tests on the individual orders.

## 2022-05-24 NOTE — PROGRESS NOTES
"  {PROVIDER CHARTING PREFERENCE:926617}    Subjective   Gabriel is a 47 year old who presents for the following health issues {ACCOMPANIED BY STATEMENT (Optional):333548}    HPI     Acute Illness  Acute illness concerns: ***  Onset/Duration: ***  Symptoms:   Shortness of breath: ***  Fever: {.:972275::\"no\"}  Chills/Sweats: {.:216908::\"no\"}  Headache (location?): {.:748384::\"no\"}  Sinus Pressure: {.:447503::\"no\"}  Conjunctivitis:  {.:001717::\"no\"}  Ear Pain: {.:962425::\"no\"}  Rhinorrhea: {.:474495::\"no\"}  Congestion: {.:519860::\"no\"}  Sore Throat: {.:787342::\"no\"}  Cough: {.:919361::\"no\"}  Wheeze: {.:153861::\"no\"}  Decreased Appetite: {.:148291::\"no\"}  Nausea: {.:748259::\"no\"}  Vomiting: {.:576720::\"no\"}  Diarrhea: {.:314213::\"no\"}  Dysuria/Freq.: {.:536914::\"no\"}  Dysuria or Hematuria: {.:950977::\"no\"}  Fatigue/Achiness: {.:883718::\"no\"}  Sick/Strep Exposure: {.:845085::\"no\"}  Therapies tried and outcome: {NONEORCHOOSE:833508::\"None\"}  {additonal problems for provider to add (Optional):894341}    Review of Systems   {ROS COMP (Optional):800893}      Objective    There were no vitals taken for this visit.  There is no height or weight on file to calculate BMI.  Physical Exam   {Exam List (Optional):556220}    {Diagnostic Test Results (Optional):520999}    {AMBULATORY ATTESTATION (Optional):099409}        "

## 2022-05-24 NOTE — TELEPHONE ENCOUNTER
Next 5 appointments (look out 90 days)    May 24, 2022  3:15 PM  (Arrive by 3:00 PM)  SHORT with Selwyn Ashraf MD  [facility] [place] ([facility] [place] ) [address]  [place] [address]  [phone]

## 2022-05-24 NOTE — NURSING NOTE
"Chief Complaint   Patient presents with     Cough       Initial /76 (BP Location: Right arm, Patient Position: Sitting, Cuff Size: Adult Large)   Pulse 76   Temp 97.5  F (36.4  C) (Tympanic)   Resp 16   Wt 104.3 kg (230 lb)   SpO2 98%   BMI 34.97 kg/m   Estimated body mass index is 34.97 kg/m  as calculated from the following:    Height as of 12/30/21: 1.727 m (5' 8\").    Weight as of this encounter: 104.3 kg (230 lb).  Medication Reconciliation: complete  Maris Cardenas LPN    "

## 2022-05-24 NOTE — TELEPHONE ENCOUNTER
Attempt # 1  Outcome: Talked with Patient    Comment: Writer spoke with patient regarding MyChart message below, offered to work patient in with Dr. Ashraf either today or tomorrow.  Patient stated that he worked until 7 PM today and tomorrow and that he was out of town Thursday for an appointment and that he could possibly come in Friday.  I let patient know that I would speak with Dr. Ashraf and have him advise, Per Dr. Ashraf he is concerned that it could be something with patients heart and that he would like him to come in today 5/24 or tomorrow 5/25 and that if patient declined he would work him in on  Friday 5/27.  I called patient back and let him know about Dr. Ashraf's concerns, patient is going to see what he can do about work and call me back directly to let me know if he can come in this afternoon or tomorrow 5/25.

## 2022-05-26 ENCOUNTER — OFFICE VISIT (OUTPATIENT)
Dept: UROLOGY | Facility: CLINIC | Age: 47
End: 2022-05-26
Payer: COMMERCIAL

## 2022-05-26 VITALS
WEIGHT: 230 LBS | BODY MASS INDEX: 34.07 KG/M2 | SYSTOLIC BLOOD PRESSURE: 124 MMHG | DIASTOLIC BLOOD PRESSURE: 78 MMHG | HEART RATE: 94 BPM | HEIGHT: 69 IN

## 2022-05-26 DIAGNOSIS — E10.69 TYPE 1 DIABETES MELLITUS WITH OTHER SPECIFIED COMPLICATION (H): Primary | ICD-10-CM

## 2022-05-26 DIAGNOSIS — N52.9 ERECTILE DYSFUNCTION, UNSPECIFIED ERECTILE DYSFUNCTION TYPE: ICD-10-CM

## 2022-05-26 PROCEDURE — 99214 OFFICE O/P EST MOD 30 MIN: CPT | Mod: 25 | Performed by: UROLOGY

## 2022-05-26 PROCEDURE — 51798 US URINE CAPACITY MEASURE: CPT | Performed by: UROLOGY

## 2022-05-26 RX ORDER — CEFAZOLIN SODIUM 2 G/50ML
2 SOLUTION INTRAVENOUS
Status: CANCELLED | OUTPATIENT
Start: 2022-05-26

## 2022-05-26 RX ORDER — CEFAZOLIN SODIUM 2 G/50ML
2 SOLUTION INTRAVENOUS SEE ADMIN INSTRUCTIONS
Status: CANCELLED | OUTPATIENT
Start: 2022-05-26

## 2022-05-26 ASSESSMENT — PAIN SCALES - GENERAL: PAINLEVEL: NO PAIN (0)

## 2022-05-26 NOTE — LETTER
Coding for Erectile Dysfunction and Peyronie's disease:    Diagnosis codes    1. Erectile dysfunction N52.9  2. Diabetes E10.69     (CPT codes)    3. Inflatable penile prothesis implantation. 18002

## 2022-05-26 NOTE — LETTER
"5/26/2022       RE: Yaya Mg  [address] [address]     Dear Colleague,    Thank you for referring your patient, Yaya Mg, to the [facility] [place] at [facility]. Please see a copy of my visit note below.    HPI:  Yaya Mg is a 47 year old male being seen for erectile dysfunction.    He has 20+ years of IDDM.  Better control now with job with better insurance.  Failed PDE5i and TRINI, not interested in ICI.  No history of neuropathy  Last A1c this month was 6.1    History of appendectomy   no history of inguinal hernia repair.  + DM.      Exam:  Physical Exam  /78   Pulse 94   Ht 1.753 m (5' 9\")   Wt 104.3 kg (230 lb)   BMI 33.97 kg/m      General: Alert, oriented, nad.  Pleasant and conversant.  Eyes: anicteric, EOMI.  Pulse: regular  Resps: normal, non-labored.  Abdomen:  Nondistended.  Neurological - no tremors  Skin - no discoloration/ lesions noted   exam   Phallus circumcised  Testes ++, anodular, nontender.  Vas and epididymis present and normal bilaterally  No  varicocele noted.  ASH deferred.    Review of Imaging:  The following imaging exams were independently viewed and interpreted by me and discussed with patient:  N/A     Review of Labs:  The following labs were reviewed by me and discussed with the patient:  Recent Results (from the past 720 hour(s))   Hemoglobin A1c    Collection Time: 05/18/22 10:10 AM   Result Value Ref Range    Estimated Average Glucose 128 mg/dL    Hemoglobin A1C 6.1 (H) 0.0 - 5.6 %   CBC with platelets and differential    Collection Time: 05/18/22 10:10 AM   Result Value Ref Range    WBC Count 5.8 4.0 - 11.0 10e3/uL    RBC Count 4.87 4.40 - 5.90 10e6/uL    Hemoglobin 14.5 13.3 - 17.7 g/dL    Hematocrit 41.0 40.0 - 53.0 %    MCV 84 78 - 100 fL    MCH 29.8 26.5 - 33.0 pg    MCHC 35.4 31.5 - 36.5 g/dL    RDW 12.6 10.0 - 15.0 %    Platelet Count " 214 150 - 450 10e3/uL    % Neutrophils 69 %    % Lymphocytes 16 %    % Monocytes 12 %    % Eosinophils 2 %    % Basophils 1 %    % Immature Granulocytes 0 %    NRBCs per 100 WBC 0 <1 /100    Absolute Neutrophils 4.0 1.6 - 8.3 10e3/uL    Absolute Lymphocytes 0.9 0.8 - 5.3 10e3/uL    Absolute Monocytes 0.7 0.0 - 1.3 10e3/uL    Absolute Eosinophils 0.1 0.0 - 0.7 10e3/uL    Absolute Basophils 0.0 0.0 - 0.2 10e3/uL    Absolute Immature Granulocytes 0.0 <=0.4 10e3/uL    Absolute NRBCs 0.0 10e3/uL   D dimer quantitative    Collection Time: 05/24/22  3:07 PM   Result Value Ref Range    D-Dimer Quantitative <0.30 0.00 - 0.50 ug/mL FEU   N terminal pro BNP outpatient    Collection Time: 05/24/22  3:07 PM   Result Value Ref Range    N Terminal Pro BNP Outpatient 35 0 - 450 pg/mL   CRP inflammation    Collection Time: 05/24/22  3:07 PM   Result Value Ref Range    CRP Inflammation <2.9 0.0 - 8.0 mg/L   Comprehensive metabolic panel    Collection Time: 05/24/22  3:07 PM   Result Value Ref Range    Sodium 134 133 - 144 mmol/L    Potassium 4.2 3.4 - 5.3 mmol/L    Chloride 103 94 - 109 mmol/L    Carbon Dioxide (CO2) 26 20 - 32 mmol/L    Anion Gap 5 3 - 14 mmol/L    Urea Nitrogen 16 7 - 30 mg/dL    Creatinine 0.74 0.66 - 1.25 mg/dL    Calcium 9.2 8.5 - 10.1 mg/dL    Glucose 252 (H) 70 - 99 mg/dL    Alkaline Phosphatase 117 40 - 150 U/L    AST 20 0 - 45 U/L    ALT 42 0 - 70 U/L    Protein Total 7.4 6.8 - 8.8 g/dL    Albumin 4.1 3.4 - 5.0 g/dL    Bilirubin Total 0.8 0.2 - 1.3 mg/dL    GFR Estimate >90 >60 mL/min/1.73m2   CBC with platelets and differential    Collection Time: 05/24/22  3:07 PM   Result Value Ref Range    WBC Count 4.3 4.0 - 11.0 10e3/uL    RBC Count 4.95 4.40 - 5.90 10e6/uL    Hemoglobin 14.9 13.3 - 17.7 g/dL    Hematocrit 40.7 40.0 - 53.0 %    MCV 82 78 - 100 fL    MCH 30.1 26.5 - 33.0 pg    MCHC 36.6 (H) 31.5 - 36.5 g/dL    RDW 12.5 10.0 - 15.0 %    Platelet Count 226 150 - 450 10e3/uL    % Neutrophils 63 %    %  Lymphocytes 23 %    % Monocytes 11 %    % Eosinophils 2 %    % Basophils 1 %    % Immature Granulocytes 0 %    NRBCs per 100 WBC 0 <1 /100    Absolute Neutrophils 2.7 1.6 - 8.3 10e3/uL    Absolute Lymphocytes 1.0 0.8 - 5.3 10e3/uL    Absolute Monocytes 0.5 0.0 - 1.3 10e3/uL    Absolute Eosinophils 0.1 0.0 - 0.7 10e3/uL    Absolute Basophils 0.0 0.0 - 0.2 10e3/uL    Absolute Immature Granulocytes 0.0 <=0.4 10e3/uL    Absolute NRBCs 0.0 10e3/uL     Assessment & Plan   1. ED, organic, secondary to DM.    I counseled the patient on the risks of penile implant surgery. These include, but are not limited to infection, scarring, penile shortening, damage to urethra and bladder, pain and erosion. I explained to him the risk of infection and the need for explantation in those cases; that other treatments for ED cannot be used after placing an implant, and that implants have a mechanical failure rate.  Discussed possible ectopic reservoir, possible glans necrosis/ tissue loss.  I answered all of his questions to the best of my ability and to the patient's satisfaction.     PVR 0cc today  Surgery orders placed for Colopast IPP  Discussed he's a slightly higher risk for device infection secondary to DM.  He is very well controlled on a1c, however.      Amandeep Hill MD  [facility] [place]      ==========================      Additional Coding Information:    Problems:  5 -- one or more chronic illnesses with severe exacerbation or side effects    Data Reviewed  3 or more studies reviewed, as listed above     Tests ordered: PVR    Level of risk:  4 -- minor surgery with patient or procedure risks    Time spent:  29 minutes spent on the date of the encounter doing chart review, history and exam, documentation and further activities per the note

## 2022-05-26 NOTE — PROGRESS NOTES
"HPI:  Yaya Mg is a 47 year old male being seen for erectile dysfunction.    He has 20+ years of IDDM.  Better control now with job with better insurance.  Failed PDE5i and TRINI, not interested in ICI.  No history of neuropathy  Last A1c this month was 6.1    History of appendectomy   no history of inguinal hernia repair.  + DM.      Exam:  Physical Exam  /78   Pulse 94   Ht 1.753 m (5' 9\")   Wt 104.3 kg (230 lb)   BMI 33.97 kg/m      General: Alert, oriented, nad.  Pleasant and conversant.  Eyes: anicteric, EOMI.  Pulse: regular  Resps: normal, non-labored.  Abdomen:  Nondistended.  Neurological - no tremors  Skin - no discoloration/ lesions noted   exam   Phallus circumcised  Testes ++, anodular, nontender.  Vas and epididymis present and normal bilaterally  No  varicocele noted.  ASH deferred.    Review of Imaging:  The following imaging exams were independently viewed and interpreted by me and discussed with patient:  N/A     Review of Labs:  The following labs were reviewed by me and discussed with the patient:  Recent Results (from the past 720 hour(s))   Hemoglobin A1c    Collection Time: 05/18/22 10:10 AM   Result Value Ref Range    Estimated Average Glucose 128 mg/dL    Hemoglobin A1C 6.1 (H) 0.0 - 5.6 %   CBC with platelets and differential    Collection Time: 05/18/22 10:10 AM   Result Value Ref Range    WBC Count 5.8 4.0 - 11.0 10e3/uL    RBC Count 4.87 4.40 - 5.90 10e6/uL    Hemoglobin 14.5 13.3 - 17.7 g/dL    Hematocrit 41.0 40.0 - 53.0 %    MCV 84 78 - 100 fL    MCH 29.8 26.5 - 33.0 pg    MCHC 35.4 31.5 - 36.5 g/dL    RDW 12.6 10.0 - 15.0 %    Platelet Count 214 150 - 450 10e3/uL    % Neutrophils 69 %    % Lymphocytes 16 %    % Monocytes 12 %    % Eosinophils 2 %    % Basophils 1 %    % Immature Granulocytes 0 %    NRBCs per 100 WBC 0 <1 /100    Absolute Neutrophils 4.0 1.6 - 8.3 10e3/uL    Absolute Lymphocytes 0.9 0.8 - 5.3 10e3/uL    Absolute Monocytes 0.7 0.0 - 1.3 10e3/uL "    Absolute Eosinophils 0.1 0.0 - 0.7 10e3/uL    Absolute Basophils 0.0 0.0 - 0.2 10e3/uL    Absolute Immature Granulocytes 0.0 <=0.4 10e3/uL    Absolute NRBCs 0.0 10e3/uL   D dimer quantitative    Collection Time: 05/24/22  3:07 PM   Result Value Ref Range    D-Dimer Quantitative <0.30 0.00 - 0.50 ug/mL FEU   N terminal pro BNP outpatient    Collection Time: 05/24/22  3:07 PM   Result Value Ref Range    N Terminal Pro BNP Outpatient 35 0 - 450 pg/mL   CRP inflammation    Collection Time: 05/24/22  3:07 PM   Result Value Ref Range    CRP Inflammation <2.9 0.0 - 8.0 mg/L   Comprehensive metabolic panel    Collection Time: 05/24/22  3:07 PM   Result Value Ref Range    Sodium 134 133 - 144 mmol/L    Potassium 4.2 3.4 - 5.3 mmol/L    Chloride 103 94 - 109 mmol/L    Carbon Dioxide (CO2) 26 20 - 32 mmol/L    Anion Gap 5 3 - 14 mmol/L    Urea Nitrogen 16 7 - 30 mg/dL    Creatinine 0.74 0.66 - 1.25 mg/dL    Calcium 9.2 8.5 - 10.1 mg/dL    Glucose 252 (H) 70 - 99 mg/dL    Alkaline Phosphatase 117 40 - 150 U/L    AST 20 0 - 45 U/L    ALT 42 0 - 70 U/L    Protein Total 7.4 6.8 - 8.8 g/dL    Albumin 4.1 3.4 - 5.0 g/dL    Bilirubin Total 0.8 0.2 - 1.3 mg/dL    GFR Estimate >90 >60 mL/min/1.73m2   CBC with platelets and differential    Collection Time: 05/24/22  3:07 PM   Result Value Ref Range    WBC Count 4.3 4.0 - 11.0 10e3/uL    RBC Count 4.95 4.40 - 5.90 10e6/uL    Hemoglobin 14.9 13.3 - 17.7 g/dL    Hematocrit 40.7 40.0 - 53.0 %    MCV 82 78 - 100 fL    MCH 30.1 26.5 - 33.0 pg    MCHC 36.6 (H) 31.5 - 36.5 g/dL    RDW 12.5 10.0 - 15.0 %    Platelet Count 226 150 - 450 10e3/uL    % Neutrophils 63 %    % Lymphocytes 23 %    % Monocytes 11 %    % Eosinophils 2 %    % Basophils 1 %    % Immature Granulocytes 0 %    NRBCs per 100 WBC 0 <1 /100    Absolute Neutrophils 2.7 1.6 - 8.3 10e3/uL    Absolute Lymphocytes 1.0 0.8 - 5.3 10e3/uL    Absolute Monocytes 0.5 0.0 - 1.3 10e3/uL    Absolute Eosinophils 0.1 0.0 - 0.7 10e3/uL     Absolute Basophils 0.0 0.0 - 0.2 10e3/uL    Absolute Immature Granulocytes 0.0 <=0.4 10e3/uL    Absolute NRBCs 0.0 10e3/uL     Assessment & Plan   1. ED, organic, secondary to DM.    I counseled the patient on the risks of penile implant surgery. These include, but are not limited to infection, scarring, penile shortening, damage to urethra and bladder, pain and erosion. I explained to him the risk of infection and the need for explantation in those cases; that other treatments for ED cannot be used after placing an implant, and that implants have a mechanical failure rate.  Discussed possible ectopic reservoir, possible glans necrosis/ tissue loss.  I answered all of his questions to the best of my ability and to the patient's satisfaction.     PVR 0cc today  Surgery orders placed for Colopast IPP  Discussed he's a slightly higher risk for device infection secondary to DM.  He is very well controlled on a1c, however.      Amandeep Hill MD  [facility] [place]      ==========================      Additional Coding Information:    Problems:  5 -- one or more chronic illnesses with severe exacerbation or side effects    Data Reviewed  3 or more studies reviewed, as listed above     Tests ordered: PVR    Level of risk:  4 -- minor surgery with patient or procedure risks    Time spent:  29 minutes spent on the date of the encounter doing chart review, history and exam, documentation and further activities per the note

## 2022-05-26 NOTE — NURSING NOTE
"Chief Complaint   Patient presents with     Consult     Discuss IPP insertion       Blood pressure 124/78, pulse 94, height 1.753 m (5' 9\"), weight 104.3 kg (230 lb). Body mass index is 33.97 kg/m .    Patient Active Problem List   Diagnosis     Erectile dysfunction due to diseases classified elsewhere     Type 1 diabetes mellitus without complication (H)     Depression with anxiety     Mixed hyperlipidemia     Elevated LFTs       No Known Allergies    Current Outpatient Medications   Medication Sig Dispense Refill     aspirin (ASA) 81 MG chewable tablet Take 1 tablet (81 mg) by mouth daily 90 tablet 3     atorvastatin (LIPITOR) 20 MG tablet Take 1 tablet (20 mg) by mouth daily 90 tablet 3     Continuous Blood Gluc  (DEXCOM G6 ) JUSTEN        Continuous Blood Gluc Sensor (DEXCOM G6 SENSOR) MISC 1 each every 10 days Change every 10 days. 9 each 4     Continuous Blood Gluc Transmit (DEXCOM G6 TRANSMITTER) MISC 1 each every 3 months Change every 3 months. 1 each 4     escitalopram (LEXAPRO) 20 MG tablet Take 1 tablet (20 mg) by mouth daily 90 tablet 3     insulin aspart (NOVOLOG PEN) 100 UNIT/ML pen Inject 25 Units Subcutaneous 4 times daily (with meals and nightly) 15 mL 3     insulin aspart (NOVOLOG VIAL) 100 UNITS/ML vial To be used with the insulin pump.  TDD: 150 units 50 mL 3     Insulin Infusion Pump (T: SLIM X2 INS /CONTROL 7.4) JUSTEN        Insulin Infusion Pump Supplies (AUTOSOFT 90 INFUSION SET) MISC Inject 1 each Subcutaneous every 48 hours 45 each 4     Insulin Infusion Pump Supplies (T:SLIM X2 3ML CARTRIDGE) MISC Inject 1 each Subcutaneous every 48 hours 45 each 3     insulin pen needle (30G X 8 MM) 30G X 8 MM miscellaneous Use 3 pen needles daily or as directed. 100 each 11     insulin pen needle (31G X 8 MM) 31G X 8 MM miscellaneous Use 3 pen needles daily or as directed. 100 each 11     lisinopril (ZESTRIL) 5 MG tablet Take 1 tablet (5 mg) by mouth daily 90 tablet 3       Social History "     Tobacco Use     Smoking status: Never Smoker     Smokeless tobacco: Never Used   Vaping Use     Vaping Use: Never used   Substance Use Topics     Alcohol use: Yes     Comment: socially     Drug use: Never       Fidencio Tomlinson, EMT  5/26/2022  2:38 PM

## 2022-05-29 DIAGNOSIS — E78.2 MIXED HYPERLIPIDEMIA: ICD-10-CM

## 2022-05-31 ENCOUNTER — HOSPITAL ENCOUNTER (OUTPATIENT)
Dept: CARDIOLOGY | Facility: HOSPITAL | Age: 47
Setting detail: NUCLEAR MEDICINE
Discharge: HOME | End: 2022-05-31
Attending: FAMILY MEDICINE
Payer: COMMERCIAL

## 2022-05-31 ENCOUNTER — HOSPITAL ENCOUNTER (OUTPATIENT)
Dept: NUCLEAR MEDICINE | Facility: HOSPITAL | Age: 47
Setting detail: NUCLEAR MEDICINE
Discharge: HOME | End: 2022-05-31
Attending: FAMILY MEDICINE
Payer: COMMERCIAL

## 2022-05-31 DIAGNOSIS — R06.09 DOE (DYSPNEA ON EXERTION): ICD-10-CM

## 2022-05-31 DIAGNOSIS — E78.2 MIXED HYPERLIPIDEMIA: ICD-10-CM

## 2022-05-31 DIAGNOSIS — E10.9 TYPE 1 DIABETES MELLITUS WITHOUT COMPLICATION (H): ICD-10-CM

## 2022-05-31 LAB
CV BLOOD PRESSURE: 75 MMHG
CV STRESS MAX HR HE: 152
NUC STRESS EJECTION FRACTION: 71 %
RATE PRESSURE PRODUCT: NORMAL
STRESS ECHO BASELINE DIASTOLIC HE: 78
STRESS ECHO BASELINE HR: 79 BPM
STRESS ECHO BASELINE SYSTOLIC BP: 108
STRESS ECHO CALCULATED PERCENT HR: 88 %
STRESS ECHO LAST STRESS DIASTOLIC BP: 84
STRESS ECHO LAST STRESS SYSTOLIC BP: 180
STRESS ECHO POST ESTIMATED WORKLOAD: 10.1 METS
STRESS ECHO POST EXERCISE DUR MIN: 8 MIN
STRESS ECHO POST EXERCISE DUR SEC: 15 SEC
STRESS ECHO TARGET HR: 173

## 2022-05-31 PROCEDURE — 93016 CV STRESS TEST SUPVJ ONLY: CPT | Performed by: INTERNAL MEDICINE

## 2022-05-31 PROCEDURE — 93017 CV STRESS TEST TRACING ONLY: CPT

## 2022-05-31 PROCEDURE — 343N000001 HC RX 343: Performed by: RADIOLOGY

## 2022-05-31 PROCEDURE — 258N000003 HC RX IP 258 OP 636: Performed by: INTERNAL MEDICINE

## 2022-05-31 PROCEDURE — 93018 CV STRESS TEST I&R ONLY: CPT | Performed by: INTERNAL MEDICINE

## 2022-05-31 PROCEDURE — A9500 TC99M SESTAMIBI: HCPCS | Performed by: RADIOLOGY

## 2022-05-31 PROCEDURE — 78452 HT MUSCLE IMAGE SPECT MULT: CPT

## 2022-05-31 RX ORDER — SODIUM CHLORIDE 9 MG/ML
INJECTION, SOLUTION INTRAVENOUS ONCE
Status: COMPLETED | OUTPATIENT
Start: 2022-05-31 | End: 2022-05-31

## 2022-05-31 RX ORDER — ATORVASTATIN CALCIUM 20 MG/1
TABLET, FILM COATED ORAL
Qty: 90 TABLET | Refills: 3 | Status: SHIPPED | OUTPATIENT
Start: 2022-05-31 | End: 2023-01-17

## 2022-05-31 RX ADMIN — Medication 10.7 MILLICURIE: at 07:10

## 2022-05-31 RX ADMIN — SODIUM CHLORIDE: 9 INJECTION, SOLUTION INTRAVENOUS at 09:05

## 2022-05-31 RX ADMIN — Medication 32.5 MILLICURIE: at 09:21

## 2022-05-31 NOTE — TELEPHONE ENCOUNTER
Atorvastatin      Last Written Prescription Date:  3/29/2022  Last Fill Quantity: 90,   # refills: 3  Last Office Visit: 5/24/2022  Future Office visit:    Next 5 appointments (look out 90 days)    Jun 01, 2022  9:30 AM  (Arrive by 9:15 AM)  CONSULT with Rylan Martinez MD  [facility] [place] ([facility] [place] ) [address]  [place] [address]  [phone]   Jun 28, 2022  7:45 AM  (Arrive by 7:30 AM)  SHORT with Selwyn Ashraf MD  [facility] [place] ([facility] [place] ) [address]  [place] [address]  [phone]   Jul 22, 2022  7:45 AM  (Arrive by 7:30 AM)  PHYSICAL with Selwyn Ashraf MD  [facility] [place] ([facility] [place] ) [address]  [place] [address]  [phone]

## 2022-06-01 ENCOUNTER — MYC MEDICAL ADVICE (OUTPATIENT)
Dept: FAMILY MEDICINE | Facility: OTHER | Age: 47
End: 2022-06-01

## 2022-06-01 DIAGNOSIS — R06.09 DOE (DYSPNEA ON EXERTION): Primary | ICD-10-CM

## 2022-06-03 ENCOUNTER — TELEPHONE (OUTPATIENT)
Dept: UROLOGY | Facility: CLINIC | Age: 47
End: 2022-06-03
Payer: COMMERCIAL

## 2022-06-03 ENCOUNTER — MYC MEDICAL ADVICE (OUTPATIENT)
Dept: FAMILY MEDICINE | Facility: OTHER | Age: 47
End: 2022-06-03
Payer: COMMERCIAL

## 2022-06-03 NOTE — TELEPHONE ENCOUNTER
Patient is scheduled for procedure with Dr. Hill     Spoke with: Patient via phone     Date of Surgery: Tuesday January 03, 2023     Location: [place] OR      Informed patient they will need an adult : Yes     Pre-op: Yes      H&P: Patient to schedule with PCP      Pre-procedure COVID-19 Test: Friday December 30, 2022 at [facility]     Post-op: Thursday January 19, 2023    Additional imaging/appointments:     Additional comments:      Surgery packet: Sent via [employer]     Patient is aware that surgery time is tentative to change and to expect a call 3-1 business days from Pre Admission Nursing for instructions and arrival time

## 2022-06-08 ENCOUNTER — HOSPITAL ENCOUNTER (OUTPATIENT)
Dept: CARDIOLOGY | Facility: HOSPITAL | Age: 47
Discharge: HOME | End: 2022-06-08
Attending: FAMILY MEDICINE | Admitting: INTERNAL MEDICINE
Payer: COMMERCIAL

## 2022-06-08 DIAGNOSIS — R06.09 DOE (DYSPNEA ON EXERTION): ICD-10-CM

## 2022-06-08 LAB — LVEF ECHO: NORMAL

## 2022-06-08 PROCEDURE — 93306 TTE W/DOPPLER COMPLETE: CPT | Mod: 26 | Performed by: INTERNAL MEDICINE

## 2022-06-08 PROCEDURE — 93306 TTE W/DOPPLER COMPLETE: CPT

## 2022-07-03 DIAGNOSIS — E10.9 TYPE 1 DIABETES MELLITUS WITHOUT COMPLICATION (H): ICD-10-CM

## 2022-07-06 ENCOUNTER — MYC MEDICAL ADVICE (OUTPATIENT)
Dept: FAMILY MEDICINE | Facility: OTHER | Age: 47
End: 2022-07-06

## 2022-07-06 DIAGNOSIS — E10.9 TYPE 1 DIABETES MELLITUS WITHOUT COMPLICATION (H): Primary | ICD-10-CM

## 2022-07-06 RX ORDER — INSULIN ASPART 100 [IU]/ML
INJECTION, SOLUTION INTRAVENOUS; SUBCUTANEOUS
Qty: 50 ML | Refills: 0 | Status: SHIPPED | OUTPATIENT
Start: 2022-07-06 | End: 2022-08-08

## 2022-07-06 RX ORDER — IBUPROFEN 200 MG
TABLET ORAL
Qty: 100 EACH | Refills: 0 | Status: SHIPPED | OUTPATIENT
Start: 2022-07-06 | End: 2022-10-21

## 2022-07-06 NOTE — TELEPHONE ENCOUNTER
Novolog      Last Written Prescription Date:  6.21.22  Last Fill Quantity: #50mL,   # refills: 0  Last Office Visit:   Future Office visit:    Next 5 appointments (look out 90 days)    Sep 19, 2022  2:45 PM  (Arrive by 2:30 PM)  PHYSICAL with Selwyn Ashraf MD  [facility] [place] ([facility] [place] ) [address]  [place] [address]  [phone]           Routing refill request to provider for review/approval because:

## 2022-07-09 ENCOUNTER — MYC MEDICAL ADVICE (OUTPATIENT)
Dept: EDUCATION SERVICES | Facility: OTHER | Age: 47
End: 2022-07-09

## 2022-09-04 ENCOUNTER — HEALTH MAINTENANCE LETTER (OUTPATIENT)
Age: 47
End: 2022-09-04

## 2022-09-10 DIAGNOSIS — E10.9 TYPE 1 DIABETES MELLITUS WITHOUT COMPLICATION (H): ICD-10-CM

## 2022-09-13 RX ORDER — INSULIN ASPART 100 [IU]/ML
INJECTION, SOLUTION INTRAVENOUS; SUBCUTANEOUS
Qty: 50 ML | Refills: 1 | Status: SHIPPED | OUTPATIENT
Start: 2022-09-13 | End: 2022-11-08

## 2022-09-13 NOTE — TELEPHONE ENCOUNTER
Novolog      Last Written Prescription Date:  08/08/22  Last Fill Quantity: 50ml,   # refills: 1  Last Office Visit: 12/30/21  Future Office visit:    Next 5 appointments (look out 90 days)    Sep 19, 2022  2:45 PM  (Arrive by 2:30 PM)  PHYSICAL with Selwyn Ashraf MD  [facility] [place] ([facility] [place] ) [address]  [place] [address]  [phone]

## 2022-10-04 ENCOUNTER — HOSPITAL ENCOUNTER (OUTPATIENT)
Dept: RESPIRATORY THERAPY | Facility: HOSPITAL | Age: 47
Discharge: HOME | End: 2022-10-04
Attending: FAMILY MEDICINE | Admitting: INTERNAL MEDICINE
Payer: COMMERCIAL

## 2022-10-04 DIAGNOSIS — F41.8 DEPRESSION WITH ANXIETY: ICD-10-CM

## 2022-10-04 DIAGNOSIS — Z00.00 ROUTINE HISTORY AND PHYSICAL EXAMINATION OF ADULT: ICD-10-CM

## 2022-10-04 DIAGNOSIS — E78.2 MIXED HYPERLIPIDEMIA: ICD-10-CM

## 2022-10-04 DIAGNOSIS — Z80.0 FH: COLON CANCER IN RELATIVE DIAGNOSED AT >50 YEARS OLD: ICD-10-CM

## 2022-10-04 DIAGNOSIS — E10.9 TYPE 1 DIABETES MELLITUS WITHOUT COMPLICATION (H): ICD-10-CM

## 2022-10-04 DIAGNOSIS — Z12.11 SPECIAL SCREENING FOR MALIGNANT NEOPLASMS, COLON: ICD-10-CM

## 2022-10-04 DIAGNOSIS — R06.09 DOE (DYSPNEA ON EXERTION): ICD-10-CM

## 2022-10-04 LAB
BASOPHILS # BLD AUTO: 0 10E3/UL (ref 0–0.2)
BASOPHILS NFR BLD AUTO: 1 %
EOSINOPHIL # BLD AUTO: 0.2 10E3/UL (ref 0–0.7)
EOSINOPHIL NFR BLD AUTO: 5 %
ERYTHROCYTE [DISTWIDTH] IN BLOOD BY AUTOMATED COUNT: 13.6 % (ref 10–15)
EST. AVERAGE GLUCOSE BLD GHB EST-MCNC: 143 MG/DL
HBA1C MFR BLD: 6.6 % (ref 0–5.6)
HCT VFR BLD AUTO: 41.5 % (ref 40–53)
HGB BLD-MCNC: 14.9 G/DL (ref 13.3–17.7)
IMM GRANULOCYTES # BLD: 0 10E3/UL
IMM GRANULOCYTES NFR BLD: 0 %
LYMPHOCYTES # BLD AUTO: 1.4 10E3/UL (ref 0.8–5.3)
LYMPHOCYTES NFR BLD AUTO: 29 %
MCH RBC QN AUTO: 29.9 PG (ref 26.5–33)
MCHC RBC AUTO-ENTMCNC: 35.9 G/DL (ref 31.5–36.5)
MCV RBC AUTO: 83 FL (ref 78–100)
MONOCYTES # BLD AUTO: 0.7 10E3/UL (ref 0–1.3)
MONOCYTES NFR BLD AUTO: 15 %
NEUTROPHILS # BLD AUTO: 2.5 10E3/UL (ref 1.6–8.3)
NEUTROPHILS NFR BLD AUTO: 50 %
NRBC # BLD AUTO: 0 10E3/UL
NRBC BLD AUTO-RTO: 0 /100
PLATELET # BLD AUTO: 180 10E3/UL (ref 150–450)
RBC # BLD AUTO: 4.99 10E6/UL (ref 4.4–5.9)
WBC # BLD AUTO: 4.9 10E3/UL (ref 4–11)

## 2022-10-04 PROCEDURE — 94726 PLETHYSMOGRAPHY LUNG VOLUMES: CPT | Mod: 26 | Performed by: INTERNAL MEDICINE

## 2022-10-04 PROCEDURE — 36415 COLL VENOUS BLD VENIPUNCTURE: CPT

## 2022-10-04 PROCEDURE — 94729 DIFFUSING CAPACITY: CPT | Mod: 26 | Performed by: INTERNAL MEDICINE

## 2022-10-04 PROCEDURE — 83036 HEMOGLOBIN GLYCOSYLATED A1C: CPT

## 2022-10-04 PROCEDURE — 94729 DIFFUSING CAPACITY: CPT

## 2022-10-04 PROCEDURE — 94010 BREATHING CAPACITY TEST: CPT | Mod: 26 | Performed by: INTERNAL MEDICINE

## 2022-10-04 PROCEDURE — 94726 PLETHYSMOGRAPHY LUNG VOLUMES: CPT

## 2022-10-04 PROCEDURE — 94010 BREATHING CAPACITY TEST: CPT

## 2022-10-04 PROCEDURE — 85004 AUTOMATED DIFF WBC COUNT: CPT

## 2022-10-04 RX ORDER — ALBUTEROL SULFATE 0.83 MG/ML
2.5 SOLUTION RESPIRATORY (INHALATION)
Status: DISCONTINUED | OUTPATIENT
Start: 2022-10-04 | End: 2022-10-05 | Stop reason: HOSPADM

## 2022-10-10 ENCOUNTER — MYC MEDICAL ADVICE (OUTPATIENT)
Dept: FAMILY MEDICINE | Facility: OTHER | Age: 47
End: 2022-10-10

## 2022-10-10 NOTE — TELEPHONE ENCOUNTER
Patient notified that per provider: mild changes on PFT, no meds needed, will discuss at pX on 11/10/22

## 2022-10-21 DIAGNOSIS — E10.9 TYPE 1 DIABETES MELLITUS WITHOUT COMPLICATION (H): ICD-10-CM

## 2022-10-21 RX ORDER — PEN NEEDLE, DIABETIC 29 G X1/2"
NEEDLE, DISPOSABLE MISCELLANEOUS
Qty: 100 EACH | Refills: 0 | Status: SHIPPED | OUTPATIENT
Start: 2022-10-21 | End: 2022-11-23

## 2022-11-06 DIAGNOSIS — E10.9 TYPE 1 DIABETES MELLITUS WITHOUT COMPLICATION (H): ICD-10-CM

## 2022-11-08 RX ORDER — INSULIN ASPART 100 [IU]/ML
INJECTION, SOLUTION INTRAVENOUS; SUBCUTANEOUS
Qty: 50 ML | Refills: 1 | Status: SHIPPED | OUTPATIENT
Start: 2022-11-08 | End: 2022-12-29

## 2022-11-08 NOTE — TELEPHONE ENCOUNTER
Novolog      Last Written Prescription Date:  10.9.22  Last Fill Quantity: #50mL,   # refills: 0  Last Office Visit: 5.24.22  Future Office visit:    Next 5 appointments (look out 90 days)    Nov 10, 2022  2:15 PM  PHYSICAL with Selwyn Ashraf MD  [facility] [place] ([facility] [place] ) [address]  [place] [address]  [phone]           Routing refill request to provider for review/approval because:

## 2022-11-10 ENCOUNTER — LAB (OUTPATIENT)
Dept: LAB | Facility: OTHER | Age: 47
End: 2022-11-10
Attending: FAMILY MEDICINE
Payer: COMMERCIAL

## 2022-11-10 ENCOUNTER — OFFICE VISIT (OUTPATIENT)
Dept: FAMILY MEDICINE | Facility: OTHER | Age: 47
End: 2022-11-10
Attending: FAMILY MEDICINE
Payer: COMMERCIAL

## 2022-11-10 VITALS
SYSTOLIC BLOOD PRESSURE: 105 MMHG | TEMPERATURE: 97.6 F | DIASTOLIC BLOOD PRESSURE: 67 MMHG | RESPIRATION RATE: 18 BRPM | BODY MASS INDEX: 35.26 KG/M2 | HEART RATE: 96 BPM | OXYGEN SATURATION: 98 % | WEIGHT: 238.8 LBS

## 2022-11-10 DIAGNOSIS — Z00.00 ROUTINE HISTORY AND PHYSICAL EXAMINATION OF ADULT: ICD-10-CM

## 2022-11-10 DIAGNOSIS — F41.8 DEPRESSION WITH ANXIETY: ICD-10-CM

## 2022-11-10 DIAGNOSIS — Z23 NEED FOR PROPHYLACTIC VACCINATION AND INOCULATION AGAINST INFLUENZA: Primary | ICD-10-CM

## 2022-11-10 DIAGNOSIS — E78.2 MIXED HYPERLIPIDEMIA: ICD-10-CM

## 2022-11-10 DIAGNOSIS — E66.01 MORBID OBESITY (H): ICD-10-CM

## 2022-11-10 DIAGNOSIS — E10.9 TYPE 1 DIABETES MELLITUS WITHOUT COMPLICATION (H): ICD-10-CM

## 2022-11-10 DIAGNOSIS — Z80.0 FH: COLON CANCER IN RELATIVE DIAGNOSED AT >50 YEARS OLD: ICD-10-CM

## 2022-11-10 DIAGNOSIS — Z23 NEED FOR VACCINATION: ICD-10-CM

## 2022-11-10 DIAGNOSIS — Z12.11 SPECIAL SCREENING FOR MALIGNANT NEOPLASMS, COLON: ICD-10-CM

## 2022-11-10 DIAGNOSIS — R20.8 BURNING SENSATION OF FEET: ICD-10-CM

## 2022-11-10 DIAGNOSIS — J45.991 COUGH VARIANT ASTHMA: ICD-10-CM

## 2022-11-10 DIAGNOSIS — K42.9 UMBILICAL HERNIA WITHOUT OBSTRUCTION AND WITHOUT GANGRENE: ICD-10-CM

## 2022-11-10 DIAGNOSIS — R79.89 ELEVATED SERUM CREATININE: ICD-10-CM

## 2022-11-10 LAB
ALBUMIN SERPL BCG-MCNC: 4.4 G/DL (ref 3.5–5.2)
ALP SERPL-CCNC: 114 U/L (ref 40–129)
ALT SERPL W P-5'-P-CCNC: 42 U/L (ref 10–50)
ANION GAP SERPL CALCULATED.3IONS-SCNC: 7 MMOL/L (ref 7–15)
AST SERPL W P-5'-P-CCNC: 28 U/L (ref 10–50)
BILIRUB SERPL-MCNC: 1.2 MG/DL
BUN SERPL-MCNC: 13.3 MG/DL (ref 6–20)
CALCIUM SERPL-MCNC: 9.4 MG/DL (ref 8.6–10)
CHLORIDE SERPL-SCNC: 102 MMOL/L (ref 98–107)
CREAT SERPL-MCNC: 1.42 MG/DL (ref 0.67–1.17)
CREAT UR-MCNC: 394.5 MG/DL
DEPRECATED HCO3 PLAS-SCNC: 27 MMOL/L (ref 22–29)
GFR SERPL CREATININE-BSD FRML MDRD: 61 ML/MIN/1.73M2
GLUCOSE SERPL-MCNC: 155 MG/DL (ref 70–99)
MICROALBUMIN UR-MCNC: 24.5 MG/L
MICROALBUMIN/CREAT UR: 6.21 MG/G CR (ref 0–17)
POTASSIUM SERPL-SCNC: 4.5 MMOL/L (ref 3.4–5.3)
PROT SERPL-MCNC: 6.7 G/DL (ref 6.4–8.3)
SODIUM SERPL-SCNC: 136 MMOL/L (ref 136–145)

## 2022-11-10 PROCEDURE — 90472 IMMUNIZATION ADMIN EACH ADD: CPT | Performed by: FAMILY MEDICINE

## 2022-11-10 PROCEDURE — 99213 OFFICE O/P EST LOW 20 MIN: CPT | Mod: 25 | Performed by: FAMILY MEDICINE

## 2022-11-10 PROCEDURE — 82043 UR ALBUMIN QUANTITATIVE: CPT

## 2022-11-10 PROCEDURE — 90715 TDAP VACCINE 7 YRS/> IM: CPT | Performed by: FAMILY MEDICINE

## 2022-11-10 PROCEDURE — 90471 IMMUNIZATION ADMIN: CPT | Performed by: FAMILY MEDICINE

## 2022-11-10 PROCEDURE — 90682 RIV4 VACC RECOMBINANT DNA IM: CPT | Performed by: FAMILY MEDICINE

## 2022-11-10 PROCEDURE — 80053 COMPREHEN METABOLIC PANEL: CPT

## 2022-11-10 PROCEDURE — 36415 COLL VENOUS BLD VENIPUNCTURE: CPT

## 2022-11-10 PROCEDURE — 99396 PREV VISIT EST AGE 40-64: CPT | Mod: 25 | Performed by: FAMILY MEDICINE

## 2022-11-10 RX ORDER — ALBUTEROL SULFATE 90 UG/1
2 AEROSOL, METERED RESPIRATORY (INHALATION) EVERY 6 HOURS
Qty: 18 G | Refills: 4 | Status: SHIPPED | OUTPATIENT
Start: 2022-11-10 | End: 2023-04-12

## 2022-11-10 RX ORDER — GABAPENTIN 300 MG/1
CAPSULE ORAL
Qty: 60 CAPSULE | Refills: 3 | Status: SHIPPED | OUTPATIENT
Start: 2022-11-10 | End: 2023-04-03

## 2022-11-10 ASSESSMENT — ENCOUNTER SYMPTOMS
SHORTNESS OF BREATH: 1
JOINT SWELLING: 1
CONSTIPATION: 0
NERVOUS/ANXIOUS: 0
FEVER: 0
HEMATURIA: 0
HEMATOCHEZIA: 0
PARESTHESIAS: 0
HEARTBURN: 0
HEADACHES: 0
WEAKNESS: 0
CHILLS: 0
DIZZINESS: 0
DIARRHEA: 0
ABDOMINAL PAIN: 0
COUGH: 0
PALPITATIONS: 0
DYSURIA: 0
EYE PAIN: 0
FREQUENCY: 0
ARTHRALGIAS: 1
MYALGIAS: 0
NAUSEA: 0
SORE THROAT: 0

## 2022-11-10 ASSESSMENT — ANXIETY QUESTIONNAIRES
GAD7 TOTAL SCORE: 1
2. NOT BEING ABLE TO STOP OR CONTROL WORRYING: NOT AT ALL
5. BEING SO RESTLESS THAT IT IS HARD TO SIT STILL: NOT AT ALL
3. WORRYING TOO MUCH ABOUT DIFFERENT THINGS: NOT AT ALL
6. BECOMING EASILY ANNOYED OR IRRITABLE: SEVERAL DAYS
7. FEELING AFRAID AS IF SOMETHING AWFUL MIGHT HAPPEN: NOT AT ALL
IF YOU CHECKED OFF ANY PROBLEMS ON THIS QUESTIONNAIRE, HOW DIFFICULT HAVE THESE PROBLEMS MADE IT FOR YOU TO DO YOUR WORK, TAKE CARE OF THINGS AT HOME, OR GET ALONG WITH OTHER PEOPLE: NOT DIFFICULT AT ALL
GAD7 TOTAL SCORE: 1
4. TROUBLE RELAXING: NOT AT ALL
1. FEELING NERVOUS, ANXIOUS, OR ON EDGE: NOT AT ALL

## 2022-11-10 ASSESSMENT — PATIENT HEALTH QUESTIONNAIRE - PHQ9: SUM OF ALL RESPONSES TO PHQ QUESTIONS 1-9: 3

## 2022-11-10 ASSESSMENT — PAIN SCALES - GENERAL: PAINLEVEL: NO PAIN (0)

## 2022-11-10 NOTE — PROGRESS NOTES
SUBJECTIVE:   CC: Gabriel is an 47 year old who presents for preventative health visit.       Patient has been advised of split billing requirements and indicates understanding: Yes  Healthy Habits:     Getting at least 3 servings of Calcium per day:  Yes    Bi-annual eye exam:  NO    Dental care twice a year:  Yes    Sleep apnea or symptoms of sleep apnea:  Daytime drowsiness    Diet:  Regular (no restrictions)    Frequency of exercise:  2-3 days/week    Duration of exercise:  15-30 minutes    Taking medications regularly:  Yes    Medication side effects:  Not applicable    PHQ-2 Total Score: 0    Additional concerns today:  No          Diabetes Follow-up    How often are you checking your blood sugar? Continuous glucose monitor  What time of day are you checking your blood sugars (select all that apply)?  Before and after meals  Have you had any blood sugars above 200?  No  Have you had any blood sugars below 70?  Yes 48    What symptoms do you notice when your blood sugar is low?  Shaky and Weak    What concerns do you have today about your diabetes? None     Do you have any of these symptoms? (Select all that apply)  No numbness or tingling in feet.  No redness, sores or blisters on feet.  No complaints of excessive thirst.  No reports of blurry vision.  No significant changes to weight.    Have you had a diabetic eye exam in the last 12 months? No        BP Readings from Last 2 Encounters:   11/10/22 105/67   05/26/22 124/78     Hemoglobin A1C (%)   Date Value   10/04/2022 6.6 (H)   05/18/2022 6.1 (H)     LDL Cholesterol Calculated (mg/dL)   Date Value   04/14/2022 57   02/17/2022 166 (H)         Hypertension Follow-up      Do you check your blood pressure regularly outside of the clinic? No     Are you following a low salt diet? No    Are your blood pressures ever more than 140 on the top number (systolic) OR more   than 90 on the bottom number (diastolic), for example 140/90? No    Depression and Anxiety  Follow-Up    How are you doing with your depression since your last visit? Improved     How are you doing with your anxiety since your last visit?  Improved     Are you having other symptoms that might be associated with depression or anxiety? No    Have you had a significant life event? No     Do you have any concerns with your use of alcohol or other drugs? No    Social History     Tobacco Use     Smoking status: Never     Smokeless tobacco: Never   Vaping Use     Vaping Use: Never used   Substance Use Topics     Alcohol use: Yes     Comment: socially     Drug use: Never     PHQ 12/15/2021 11/10/2022   PHQ-9 Total Score 4 3   Q9: Thoughts of better off dead/self-harm past 2 weeks Not at all Not at all     SIOBHAN-7 SCORE 12/15/2021 11/10/2022   Total Score 0 1     Last PHQ-9 11/10/2022   1.  Little interest or pleasure in doing things 0   2.  Feeling down, depressed, or hopeless 0   3.  Trouble falling or staying asleep, or sleeping too much 2   4.  Feeling tired or having little energy 1   5.  Poor appetite or overeating 0   6.  Feeling bad about yourself 0   7.  Trouble concentrating 0   8.  Moving slowly or restless 0   Q9: Thoughts of better off dead/self-harm past 2 weeks 0   PHQ-9 Total Score 3   Difficulty at work, home, or with people Not difficult at all     SIOBHAN-7  11/10/2022   1. Feeling nervous, anxious, or on edge 0   2. Not being able to stop or control worrying 0   3. Worrying too much about different things 0   4. Trouble relaxing 0   5. Being so restless that it is hard to sit still 0   6. Becoming easily annoyed or irritable 1   7. Feeling afraid, as if something awful might happen 0   SIOBHAN-7 Total Score 1   If you checked any problems, how difficult have they made it for you to do your work, take care of things at home, or get along with other people? Not difficult at all       Suicide Assessment Five-step Evaluation and Treatment (SAFE-T)      Today's PHQ-2 Score:   PHQ-2 ( 1999 [employer]) 11/10/2022    Q1: Little interest or pleasure in doing things 0   Q2: Feeling down, depressed or hopeless 0   PHQ-2 Score 0   Q1: Little interest or pleasure in doing things Not at all   Q2: Feeling down, depressed or hopeless Not at all   PHQ-2 Score 0       Abuse: Current or Past(Physical, Sexual or Emotional)- No  Do you feel safe in your environment? Yes    Have you ever done Advance Care Planning? (For example, a Health Directive, POLST, or a discussion with a medical provider or your loved ones about your wishes):     Social History     Tobacco Use     Smoking status: Never     Smokeless tobacco: Never   Substance Use Topics     Alcohol use: Yes     Comment: socially     If you drink alcohol do you typically have >3 drinks per day or >7 drinks per week? No    Alcohol Use 11/10/2022   Prescreen: >3 drinks/day or >7 drinks/week? No       Last PSA: No results found for: PSA    Reviewed orders with patient. Reviewed health maintenance and updated orders accordingly - Yes  Labs reviewed in EPIC    Reviewed and updated as needed this visit by clinical staff   Tobacco  Allergies  Meds   Med Hx  Surg Hx  Fam Hx  Soc Hx        Reviewed and updated as needed this visit by Provider                 Past Medical History:   Diagnosis Date     Anxiety      Depressive disorder      Hypertension      Type 1 diabetes (H)       Past Surgical History:   Procedure Laterality Date     APPENDECTOMY  01/1985     BACK SURGERY  2010    lumbar calcium deposits removed     CATARACT EXTRACTION Right 2021       Review of Systems   Constitutional: Negative for chills and fever.   HENT: Negative for congestion, ear pain, hearing loss and sore throat.    Eyes: Positive for visual disturbance. Negative for pain.   Respiratory: Positive for shortness of breath. Negative for cough.    Cardiovascular: Negative for chest pain, palpitations and peripheral edema.   Gastrointestinal: Negative for abdominal pain, constipation, diarrhea, heartburn,  hematochezia and nausea.   Genitourinary: Positive for impotence. Negative for dysuria, frequency, genital sores, hematuria, penile discharge and urgency.   Musculoskeletal: Positive for arthralgias and joint swelling. Negative for myalgias.   Skin: Negative for rash.   Neurological: Negative for dizziness, weakness, headaches and paresthesias.   Psychiatric/Behavioral: Negative for mood changes. The patient is not nervous/anxious.      CONSTITUTIONAL: NEGATIVE for fever, chills, change in weight  INTEGUMENTARY/SKIN: NEGATIVE for worrisome rashes, moles or lesions  EYES: NEGATIVE for vision changes or irritation  ENT: NEGATIVE for ear, mouth and throat problems  RESP: NEGATIVE for significant cough or SOB  CV: NEGATIVE for chest pain, palpitations or peripheral edema  GI: NEGATIVE for nausea, abdominal pain, heartburn, or change in bowel habits   male: negative for dysuria, hematuria, decreased urinary stream, erectile dysfunction, urethral discharge  MUSCULOSKELETAL: NEGATIVE for significant arthralgias or myalgia  NEURO: NEGATIVE for weakness, dizziness or paresthesias  PSYCHIATRIC: NEGATIVE for changes in mood or affect    Still has MCCONNELL but now says over 75% time has coughing spells.   C/o burning/pain of feet priya when on them a lot.     OBJECTIVE:   /67 (BP Location: Right arm, Patient Position: Sitting, Cuff Size: Adult Large)   Pulse 96   Temp 97.6  F (36.4  C) (Tympanic)   Resp 18   Wt 108.3 kg (238 lb 12.8 oz)   SpO2 98%   BMI 35.26 kg/m      Physical Exam  GENERAL: healthy, alert and no distress  EYES: Eyes grossly normal to inspection, PERRL and conjunctivae and sclerae normal  HENT: ear canals and TM's normal, nose and mouth without ulcers or lesions  NECK: no adenopathy, no asymmetry, masses, or scars and thyroid normal to palpation  RESP: lungs clear to auscultation - no rales, rhonchi or wheezes  CV: regular rate and rhythm, normal S1 S2, no S3 or S4, no murmur, click or rub, no  peripheral edema and peripheral pulses strong  ABDOMEN: soft, nontender, no hepatosplenomegaly, no masses and bowel sounds normal   (male): normal male genitalia without lesions or urethral discharge, no hernia  MS: no gross musculoskeletal defects noted, no edema  SKIN: no suspicious lesions or rashes  NEURO: Normal strength and tone, mentation intact and speech normal  PSYCH: mentation appears normal, affect normal/bright  LYMPH: no cervical, supraclavicular, axillary, or inguinal adenopathy  Diabetic foot exam: normal DP and PT pulses, no trophic changes or ulcerative lesions, normal sensory exam, normal monofilament exam and onychomycosis      Results for orders placed or performed in visit on 11/10/22   Comprehensive metabolic panel     Status: Abnormal   Result Value Ref Range    Sodium 136 136 - 145 mmol/L    Potassium 4.5 3.4 - 5.3 mmol/L    Chloride 102 98 - 107 mmol/L    Carbon Dioxide (CO2) 27 22 - 29 mmol/L    Anion Gap 7 7 - 15 mmol/L    Urea Nitrogen 13.3 6.0 - 20.0 mg/dL    Creatinine 1.42 (H) 0.67 - 1.17 mg/dL    Calcium 9.4 8.6 - 10.0 mg/dL    Glucose 155 (H) 70 - 99 mg/dL    Alkaline Phosphatase 114 40 - 129 U/L    AST 28 10 - 50 U/L    ALT 42 10 - 50 U/L    Protein Total 6.7 6.4 - 8.3 g/dL    Albumin 4.4 3.5 - 5.2 g/dL    Bilirubin Total 1.2 <=1.2 mg/dL    GFR Estimate 61 >60 mL/min/1.73m2         ASSESSMENT/PLAN:   (Z23) Need for prophylactic vaccination and inoculation against influenza  (primary encounter diagnosis)  Comment: shots given  Plan: TDAP VACCINE (Adacel, Boostrix), INFLUENZA         QUAD, RECOMBINANT, P-FREE (RIV4) (FLUBLOK)           (Z00.00) Routine history and physical examination of adult  Comment: see back in a year   Plan: Comprehensive metabolic panel, CBC with         Platelets & Differential, Hemoglobin A1c,         Albumin Random Urine Quantitative with Creat         Ratio        See below     (E10.9) Type 1 diabetes mellitus without complication (H)  Comment: A1C great  for Type 1 - not due for A1C - got out of sync with visits   Plan: Comprehensive metabolic panel, CBC with         Platelets & Differential, Hemoglobin A1c,         Albumin Random Urine Quantitative with Creat         Ratio, Adult Eye  Referral        Continue current medications and behavioral changes.   Refer for eye exam and follow-up in 4 months fasting    (E78.2) Mixed hyperlipidemia  Comment: stable on statin . REcheck labs next time  Plan: Comprehensive metabolic panel, CBC with         Platelets & Differential, Hemoglobin A1c,         Albumin Random Urine Quantitative with Creat         Ratio            (F41.8) Depression with anxiety  Comment: good control.   Plan: Comprehensive metabolic panel, CBC with         Platelets & Differential, Hemoglobin A1c,         Albumin Random Urine Quantitative with Creat         Ratio        Continue current medications and behavioral changes.     (Z80.0) FH: colon cancer in relative diagnosed at >50 years old  Comment: PGM over age 60   Plan: Comprehensive metabolic panel, CBC with         Platelets & Differential, Hemoglobin A1c,         Albumin Random Urine Quantitative with Creat         Ratio        SEE below     (Z12.11) Special screening for malignant neoplasms, colon  Comment: discussed-- I feel ok to have Cologuard and then he said would get colonoscopy in 3 yrs if negative   Plan: Comprehensive metabolic panel, CBC with         Platelets & Differential, Hemoglobin A1c,         Albumin Random Urine Quantitative with Creat         Ratio, COLOGUARD([employer])            (Z23) Need for vaccination  Comment: as above   Plan: TDAP VACCINE (Adacel, Boostrix), INFLUENZA         QUAD, RECOMBINANT, P-FREE (RIV4) (FLUBLOK)            (J45.991) Cough variant asthma  Comment: I think this is going on.  W/u negative - discussed at length  Plan: albuterol (PROAIR HFA/PROVENTIL HFA/VENTOLIN         HFA) 108 (90 Base) MCG/ACT inhaler        Will try prn Albuterol  "and if works but needs more - Sinqulair trial    (E66.01) Morbid obesity (H)  Comment: pt aware needs to keep wt down. VERY muscular though  Plan: as above    (R20.8) Burning sensation of feet  Comment: discussed. Does not sound like classic DM neuropathy  Plan: gabapentin (NEURONTIN) 300 MG capsule         Discussed options-- R/B of Neurontin done and will try     (R79.89) Elevated serum creatinine  Comment: has been using moderate motrin for feet and back. STOP was discussed  - Tylenol only  Plan: Creatinine        Recheck Creatinine in 2 weeks - appt for lab made.     Umbilical hernia - moderate and sliding / asymptomatic. Wants to watchl.            COUNSELING:   Reviewed preventive health counseling, as reflected in patient instructions       Regular exercise       Healthy diet/nutrition       Colorectal cancer screening       Prostate cancer screening    Estimated body mass index is 35.26 kg/m  as calculated from the following:    Height as of 5/26/22: 1.753 m (5' 9\").    Weight as of this encounter: 108.3 kg (238 lb 12.8 oz).     Weight management plan: Discussed healthy diet and exercise guidelines    He reports that he has never smoked. He has never used smokeless tobacco.      Counseling Resources:  ATP IV Guidelines  Pooled Cohorts Equation Calculator  FRAX Risk Assessment  ICSI Preventive Guidelines  Dietary Guidelines for Americans, 2010  USDA's MyPlate  ASA Prophylaxis  Lung CA Screening    Selwyn Ashraf MD  [facility] [place]  "

## 2022-11-10 NOTE — NURSING NOTE
"Chief Complaint   Patient presents with     Physical       Initial /67 (BP Location: Right arm, Patient Position: Sitting, Cuff Size: Adult Large)   Pulse 96   Temp 97.6  F (36.4  C) (Tympanic)   Resp 18   Wt 108.3 kg (238 lb 12.8 oz)   SpO2 98%   BMI 35.26 kg/m   Estimated body mass index is 35.26 kg/m  as calculated from the following:    Height as of 5/26/22: 1.753 m (5' 9\").    Weight as of this encounter: 108.3 kg (238 lb 12.8 oz).  Medication Reconciliation: complete  Kali Madrigal LPN  "

## 2022-11-16 ENCOUNTER — MYC MEDICAL ADVICE (OUTPATIENT)
Dept: FAMILY MEDICINE | Facility: OTHER | Age: 47
End: 2022-11-16

## 2022-11-16 DIAGNOSIS — J45.991 COUGH VARIANT ASTHMA: Primary | ICD-10-CM

## 2022-11-17 RX ORDER — BUDESONIDE AND FORMOTEROL FUMARATE DIHYDRATE 80; 4.5 UG/1; UG/1
2 AEROSOL RESPIRATORY (INHALATION) 2 TIMES DAILY
Qty: 10 G | Refills: 3 | Status: SHIPPED | OUTPATIENT
Start: 2022-11-17 | End: 2023-01-26

## 2022-11-22 DIAGNOSIS — E10.9 TYPE 1 DIABETES MELLITUS WITHOUT COMPLICATION (H): ICD-10-CM

## 2022-11-23 RX ORDER — PEN NEEDLE, DIABETIC 29 G X1/2"
NEEDLE, DISPOSABLE MISCELLANEOUS
Qty: 100 EACH | Refills: 5 | Status: SHIPPED | OUTPATIENT
Start: 2022-11-23 | End: 2023-04-12

## 2022-11-28 DIAGNOSIS — N39.0 URINARY TRACT INFECTION: Primary | ICD-10-CM

## 2022-12-08 ENCOUNTER — LAB (OUTPATIENT)
Dept: LAB | Facility: OTHER | Age: 47
End: 2022-12-08
Payer: COMMERCIAL

## 2022-12-08 DIAGNOSIS — R79.89 ELEVATED SERUM CREATININE: ICD-10-CM

## 2022-12-08 DIAGNOSIS — N39.0 URINARY TRACT INFECTION: ICD-10-CM

## 2022-12-08 LAB
ALBUMIN UR-MCNC: NEGATIVE MG/DL
APPEARANCE UR: CLEAR
BILIRUB UR QL STRIP: NEGATIVE
COLOR UR AUTO: YELLOW
CREAT SERPL-MCNC: 0.95 MG/DL (ref 0.67–1.17)
GFR SERPL CREATININE-BSD FRML MDRD: >90 ML/MIN/1.73M2
GLUCOSE UR STRIP-MCNC: NEGATIVE MG/DL
HGB UR QL STRIP: NEGATIVE
KETONES UR STRIP-MCNC: NEGATIVE MG/DL
LEUKOCYTE ESTERASE UR QL STRIP: NEGATIVE
MUCOUS THREADS #/AREA URNS LPF: PRESENT /LPF
NITRATE UR QL: NEGATIVE
PH UR STRIP: 6 [PH] (ref 4.7–8)
RBC URINE: <1 /HPF
SP GR UR STRIP: 1.02 (ref 1–1.03)
SQUAMOUS EPITHELIAL: 0 /HPF
UROBILINOGEN UR STRIP-MCNC: NORMAL MG/DL
WBC URINE: <1 /HPF

## 2022-12-08 PROCEDURE — 82565 ASSAY OF CREATININE: CPT

## 2022-12-08 PROCEDURE — 81001 URINALYSIS AUTO W/SCOPE: CPT

## 2022-12-08 PROCEDURE — 36415 COLL VENOUS BLD VENIPUNCTURE: CPT

## 2022-12-09 DIAGNOSIS — R19.5 POSITIVE COLORECTAL CANCER SCREENING USING COLOGUARD TEST: Primary | ICD-10-CM

## 2022-12-09 LAB — NONINV COLON CA DNA+OCC BLD SCRN STL QL: POSITIVE

## 2022-12-10 NOTE — RESULT ENCOUNTER NOTE
Dear Yaya     Here are your recent urine test results which are NORMAL.  There are no concerns.      Thank You,    Please let me know if you have any questions!    Coby CARRENO

## 2022-12-20 ENCOUNTER — TELEPHONE (OUTPATIENT)
Dept: FAMILY MEDICINE | Facility: OTHER | Age: 47
End: 2022-12-20

## 2022-12-20 ENCOUNTER — MYC MEDICAL ADVICE (OUTPATIENT)
Dept: UROLOGY | Facility: CLINIC | Age: 47
End: 2022-12-20

## 2022-12-20 DIAGNOSIS — Z12.11 ENCOUNTER FOR SCREENING COLONOSCOPY: Primary | ICD-10-CM

## 2022-12-20 RX ORDER — BISACODYL 5 MG
10 TABLET, DELAYED RELEASE (ENTERIC COATED) ORAL ONCE
Qty: 2 TABLET | Refills: 0 | Status: SHIPPED | OUTPATIENT
Start: 2022-12-20 | End: 2022-12-20

## 2022-12-20 NOTE — TELEPHONE ENCOUNTER
Screening Questions for the Scheduling of Screening Colonoscopies     (If Colonoscopy is diagnostic, Provider should review the chart before scheduling.)    Are you younger than 50 or older than 80?  Yes, 47 year oldn    Do you take aspirin or fish oil?  No (if yes, tell patient to stop 1 week prior to Colonoscopy)    Do you take warfarin (Coumadin), clopidogrel (Plavix), apixaban (Eliquis), dabigatram (Pradaxa), rivaroxaban (Xarelto) or any blood thinner? No    Do you use oxygen at home?  No    Do you have kidney disease? No    Are you on dialysis? No    Have you had a stroke or heart attack in the last year? No    Have you had a stent in your heart or any blood vessel in the last year? No    Have you had a transplant of any organ?  No    Have you had a colonoscopy or upper endoscopy (EGD) before?  No         Date of scheduled Colonoscopy: 2/2/23    Provider: Dr Nichols     Pharmacy [facility]

## 2022-12-21 NOTE — TELEPHONE ENCOUNTER
Isabelle Jeong 2 minutes ago (9:32 AM)     Caitlin Warner that is doing the hospital prior auths checked into this patients surgery and she put the following message as follows:  I ran MANJIT and checked eligibility on [employer] for 2023 coverage which the patient shows eligible, but per call to Yony, the case cannot be started until 01/01/2023   Isabelle Peña

## 2022-12-29 ENCOUNTER — OFFICE VISIT (OUTPATIENT)
Dept: FAMILY MEDICINE | Facility: OTHER | Age: 47
End: 2022-12-29
Attending: NURSE PRACTITIONER
Payer: COMMERCIAL

## 2022-12-29 VITALS
TEMPERATURE: 98 F | SYSTOLIC BLOOD PRESSURE: 122 MMHG | BODY MASS INDEX: 34.07 KG/M2 | WEIGHT: 230 LBS | DIASTOLIC BLOOD PRESSURE: 76 MMHG | HEART RATE: 80 BPM | HEIGHT: 69 IN | OXYGEN SATURATION: 97 %

## 2022-12-29 DIAGNOSIS — E10.9 TYPE 1 DIABETES MELLITUS WITHOUT COMPLICATION (H): ICD-10-CM

## 2022-12-29 DIAGNOSIS — Z11.4 SCREENING FOR HIV (HUMAN IMMUNODEFICIENCY VIRUS): ICD-10-CM

## 2022-12-29 DIAGNOSIS — N52.9 ERECTILE DYSFUNCTION, UNSPECIFIED ERECTILE DYSFUNCTION TYPE: ICD-10-CM

## 2022-12-29 DIAGNOSIS — Z11.59 NEED FOR HEPATITIS C SCREENING TEST: ICD-10-CM

## 2022-12-29 DIAGNOSIS — Z01.818 PREOP GENERAL PHYSICAL EXAM: Primary | ICD-10-CM

## 2022-12-29 DIAGNOSIS — E66.01 MORBID OBESITY (H): ICD-10-CM

## 2022-12-29 LAB
ANION GAP SERPL CALCULATED.3IONS-SCNC: 13 MMOL/L (ref 7–15)
BUN SERPL-MCNC: 18 MG/DL (ref 6–20)
CALCIUM SERPL-MCNC: 9.4 MG/DL (ref 8.6–10)
CHLORIDE SERPL-SCNC: 97 MMOL/L (ref 98–107)
CREAT SERPL-MCNC: 0.9 MG/DL (ref 0.67–1.17)
DEPRECATED HCO3 PLAS-SCNC: 25 MMOL/L (ref 22–29)
ERYTHROCYTE [DISTWIDTH] IN BLOOD BY AUTOMATED COUNT: 13.1 % (ref 10–15)
EST. AVERAGE GLUCOSE BLD GHB EST-MCNC: 137 MG/DL
GFR SERPL CREATININE-BSD FRML MDRD: >90 ML/MIN/1.73M2
GLUCOSE SERPL-MCNC: 169 MG/DL (ref 70–99)
HBA1C MFR BLD: 6.4 %
HCT VFR BLD AUTO: 40.3 % (ref 40–53)
HGB BLD-MCNC: 14.9 G/DL (ref 13.3–17.7)
HOLD SPECIMEN: NORMAL
MCH RBC QN AUTO: 30.8 PG (ref 26.5–33)
MCHC RBC AUTO-ENTMCNC: 37 G/DL (ref 31.5–36.5)
MCV RBC AUTO: 83 FL (ref 78–100)
PLATELET # BLD AUTO: 202 10E3/UL (ref 150–450)
POTASSIUM SERPL-SCNC: 4 MMOL/L (ref 3.4–5.3)
RBC # BLD AUTO: 4.84 10E6/UL (ref 4.4–5.9)
SODIUM SERPL-SCNC: 135 MMOL/L (ref 136–145)
WBC # BLD AUTO: 4.6 10E3/UL (ref 4–11)

## 2022-12-29 PROCEDURE — 93000 ELECTROCARDIOGRAM COMPLETE: CPT | Mod: 77 | Performed by: INTERNAL MEDICINE

## 2022-12-29 PROCEDURE — 99214 OFFICE O/P EST MOD 30 MIN: CPT | Mod: 25 | Performed by: NURSE PRACTITIONER

## 2022-12-29 PROCEDURE — 85027 COMPLETE CBC AUTOMATED: CPT | Performed by: NURSE PRACTITIONER

## 2022-12-29 PROCEDURE — 80048 BASIC METABOLIC PNL TOTAL CA: CPT | Performed by: NURSE PRACTITIONER

## 2022-12-29 PROCEDURE — 36415 COLL VENOUS BLD VENIPUNCTURE: CPT | Performed by: NURSE PRACTITIONER

## 2022-12-29 PROCEDURE — 83036 HEMOGLOBIN GLYCOSYLATED A1C: CPT | Performed by: NURSE PRACTITIONER

## 2022-12-29 RX ORDER — INSULIN ASPART 100 [IU]/ML
INJECTION, SOLUTION INTRAVENOUS; SUBCUTANEOUS
Qty: 50 ML | Refills: 3 | Status: SHIPPED | OUTPATIENT
Start: 2022-12-29 | End: 2023-01-10

## 2022-12-29 ASSESSMENT — ASTHMA QUESTIONNAIRES: ACT_TOTALSCORE: 19

## 2022-12-29 ASSESSMENT — PAIN SCALES - GENERAL: PAINLEVEL: NO PAIN (0)

## 2022-12-29 NOTE — PATIENT INSTRUCTIONS
Current Outpatient Medications   Medication   Continue albuterol (PROAIR HFA/PROVENTIL HFA/VENTOLIN HFA) 108 (90 Base) MCG/ACT inhaler   Do not take for 7 days prior to surgery aspirin (ASA) 81 MG chewable tablet   Continue atorvastatin (LIPITOR) 20 MG tablet   Continue budesonide-formoterol (SYMBICORT) 80-4.5 MCG/ACT Inhaler   Continue escitalopram (LEXAPRO) 20 MG tablet   Continue gabapentin (NEURONTIN) 300 MG capsule   Instructed. Will keep on until surgery. See handout on CGM and glycemia control prior to surgery.   insulin aspart (NOVOLOG VIAL) 100 UNITS/ML vial   Continue lisinopril (ZESTRIL) 5 MG tablet     No current facility-administered medications for this visit.       For informational purposes only. Not to replace the advice of your health care provider. Copyright   2003, 2019 [facility]. All rights reserved. Clinically reviewed by Tania Omalley MD. [employer] [id number] - REV 12/22.  Preparing for Your Surgery  Getting started  A nurse will call you to review your health history and instructions. They will give you an arrival time based on your scheduled surgery time. Please be ready to share:  Your doctor's clinic name and phone number  Your medical, surgical, and anesthesia history  A list of allergies and sensitivities  A list of medicines, including herbal treatments and over-the-counter drugs  Whether the patient has a legal guardian (ask how to send us the papers in advance)  Please tell us if you're pregnant--or if there's any chance you might be pregnant. Some surgeries may injure a fetus (unborn baby), so they require a pregnancy test. Surgeries that are safe for a fetus don't always need a test, and you can choose whether to have one.   If you have a child who's having surgery, please ask for a copy of Preparing for Your Child's Surgery.    Preparing for surgery  Within 10 to 30 days of surgery: Have a pre-op exam (sometimes called an H&P, or History and Physical). This can be done at  a clinic or pre-operative center.  If you're having a , you may not need this exam. Talk to your care team.  At your pre-op exam, talk to your care team about all medicines you take. If you need to stop any medicines before surgery, ask when to start taking them again.  We do this for your safety. Many medicines can make you bleed too much during surgery. Some change how well surgery (anesthesia) drugs work.  Call your insurance company to let them know you're having surgery. (If you don't have insurance, call [phone].)  Call your clinic if there's any change in your health. This includes signs of a cold or flu (sore throat, runny nose, cough, rash, fever). It also includes a scrape or scratch near the surgery site.  If you have questions on the day of surgery, call your hospital or surgery center.  Eating and drinking guidelines  For your safety: Unless your surgeon tells you otherwise, follow the guidelines below.  Eat and drink as usual until 8 hours before you arrive for surgery. After that, no food or milk.  Drink clear liquids until 2 hours before you arrive. These are liquids you can see through, like water, Gatorade, and Propel Water. They also include plain black coffee and tea (no cream or milk), candy, and breath mints. You can spit out gum when you arrive.  If you drink alcohol: Stop drinking it the night before surgery.  If your care team tells you to take medicine on the morning of surgery, it's okay to take it with a sip of water.  Preventing infection  Shower or bathe the night before and morning of your surgery. Follow the instructions your clinic gave you. (If no instructions, use regular soap.)  Don't shave or clip hair near your surgery site. We'll remove the hair if needed.  Don't smoke or vape the morning of surgery. You may chew nicotine gum up to 2 hours before surgery. A nicotine patch is okay.  Note: Some surgeries require you to completely quit smoking and nicotine. Check  with your surgeon.  Your care team will make every effort to keep you safe from infection. We will:  Clean our hands often with soap and water (or an alcohol-based hand rub).  Clean the skin at your surgery site with a special soap that kills germs.  Give you a special gown to keep you warm. (Cold raises the risk of infection.)  Wear special hair covers, masks, gowns and gloves during surgery.  Give antibiotic medicine, if prescribed. Not all surgeries need antibiotics.  What to bring on the day of surgery  Photo ID and insurance card  Copy of your health care directive, if you have one  Glasses and hearing aids (bring cases)  You can't wear contacts during surgery  Inhaler and eye drops, if you use them (tell us about these when you arrive)  CPAP machine or breathing device, if you use them  A few personal items, if spending the night  If you have . . .  A pacemaker, ICD (cardiac defibrillator) or other implant: Bring the ID card.  An implanted stimulator: Bring the remote control.  A legal guardian: Bring a copy of the certified (court-stamped) guardianship papers.  Please remove any jewelry, including body piercings. Leave jewelry and other valuables at home.  If you're going home the day of surgery  You must have a responsible adult drive you home. They should stay with you overnight as well.  If you don't have someone to stay with you, and you aren't safe to go home alone, we may keep you overnight. Insurance often won't pay for this.  After surgery  If it's hard to control your pain or you need more pain medicine, please call your surgeon's office.  Questions?   If you have any questions for your care team, list them here: _________________________________________________________________________________________________________________________________________________________________________ ____________________________________ ____________________________________  ____________________________________    Instructions About Your Continuous Glucose Monitor  You should be prepared to remove the Continuous Glucose Monitor prior to the operation in order to avoid damage to the equipment during the procedure. Your Continuous Glucose Monitor will not be the source of glucose monitoring during the operation.    How to Manage Your Diabetes Before Surgery  If you use insulin for your diabetes, follow these steps to keep your blood sugar in a safe range before surgery, when you ve been told not to eat or drink:   Check your blood sugar every 4 hours   If your blood sugar is high, take a corrective dose (not a meal dose) of sliding-scale insulin, if that is what you re used to doing  If your blood sugar is below 100, or you have symptoms of hypoglycemia, follow these steps:   Have 1 item from this list:  4 oz (1/2 cup) of fruit juice without pulp    4 oz (1/2 cup) of regular soda (not diet soda)    3 glucose gels    5 sugar cubes or sugar packets   Check your blood sugar again after 15 minutes  Repeat steps 1 and 2 again until your blood sugar is greater than 100

## 2022-12-29 NOTE — PROGRESS NOTES
"[facility] [place]  [address]  [place] [address]  Phone: [phone]  Primary Provider: No Ref-Primary, Physician  Pre-op Performing Provider: KENNETH REYEZ    {Provider  Link to PREOP SmartSet  Use this to apply standard patient instructions to AVS; includes medication directions, common orders, guidelines for anemia, warfarin, additional testing   :528980}  PREOPERATIVE EVALUATION:  Today's date: 12/29/2022    Yaya Mg is a 47 year old male who presents for a preoperative evaluation.    Surgical Information:  Surgery/Procedure: Insertion of inflatable penile prosthesis  Surgery Location: [facility]  Surgeon: Dr. Hill  Surgery Date: 1/03/23  Time of Surgery: to be determined  Where patient plans to recover: At home with family  Fax number for surgical facility: Note does not need to be faxed, will be available electronically in Epic.    Type of Anesthesia Anticipated: to be determined    Assessment & Plan     The proposed surgical procedure is considered INTERMEDIATE risk.    {Dia Picklist:985601}           {Risks and Recommendations (Optional):126981}    Medication Instructions:  Continue albuterol (PROAIR HFA/PROVENTIL HFA/VENTOLIN HFA) 108 (90 Base) MCG/ACT inhaler   Do not take for 7 days prior to surgery aspirin (ASA) 81 MG chewable tablet   Continue atorvastatin (LIPITOR) 20 MG tablet   Continue budesonide-formoterol (SYMBICORT) 80-4.5 MCG/ACT Inhaler   Continue escitalopram (LEXAPRO) 20 MG tablet   Continue gabapentin (NEURONTIN) 300 MG capsule   DO NOT TAKE AM OF SURGERY insulin aspart (NOVOLOG VIAL) 100 UNITS/ML vial   Continue lisinopril (ZESTRIL) 5 MG tablet       RECOMMENDATION:  {IMPORTANT - Approval:688115::\"APPROVAL GIVEN to proceed with proposed procedure, without further diagnostic evaluation.\"}    {[facility] 2021 Documentation (Optional):043228}  {2021 E&M time (Optional):897941}  {Provider  Link to [facility] Help Grid " :485019}    Subjective     HPI related to upcoming procedure: Gabriel is a patient of *** who had a recent positive Cologaurd screening on 11/30/22.     {Click here to pull in Questionnaire Data after Qnr completed :013361}  Health Care Directive:  Patient does not have a Health Care Directive or Living Will: {ADVANCE_DIRECTIVE_STATUS:445745}    Preoperative Review of :  {Mnpmpreport:972253}  {Review MNPMP for all patients per ICSI MNPMP Profile:504543}    {Chronic problem details (Optional) :073560}    Review of Systems  {ROS Preop Choices:260635}    Patient Active Problem List    Diagnosis Date Noted     Morbid obesity (H) 11/10/2022     Priority: Medium     Umbilical hernia without obstruction and without gangrene 11/10/2022     Priority: Medium     Burning sensation of feet 11/10/2022     Priority: Medium     Cough variant asthma 11/10/2022     Priority: Medium     FH: colon cancer in relative diagnosed at >50 years old 11/10/2022     Priority: Medium     Erectile dysfunction due to diseases classified elsewhere 12/15/2021     Priority: Medium     Type 1 diabetes mellitus without complication (H) 12/15/2021     Priority: Medium     Depression with anxiety 12/15/2021     Priority: Medium     Mixed hyperlipidemia 12/15/2021     Priority: Medium     Elevated LFTs 12/15/2021     Priority: Medium      Past Medical History:   Diagnosis Date     Anxiety      Depressive disorder      Hypertension      Type 1 diabetes (H)      Past Surgical History:   Procedure Laterality Date     APPENDECTOMY  01/1985     BACK SURGERY  2010    lumbar calcium deposits removed     CATARACT EXTRACTION Right 2021     Current Outpatient Medications   Medication Sig Dispense Refill     albuterol (PROAIR HFA/PROVENTIL HFA/VENTOLIN HFA) 108 (90 Base) MCG/ACT inhaler Inhale 2 puffs into the lungs every 6 hours 18 g 4     aspirin (ASA) 81 MG chewable tablet Take 1 tablet (81 mg) by mouth daily 90 tablet 3     atorvastatin (LIPITOR) 20 MG tablet  "TAKE 1 TABLET BY MOUTH EVERY DAY 90 tablet 3     BD INSULIN SYRINGE ULTRAFINE 30G X 1/2\" 1 ML miscellaneous USE AS NEEDED 100 each 5     budesonide-formoterol (SYMBICORT) 80-4.5 MCG/ACT Inhaler Inhale 2 puffs into the lungs 2 times daily 10 g 3     Continuous Blood Gluc  (DEXCOM G6 ) JUSTEN        Continuous Blood Gluc Sensor (DEXCOM G6 SENSOR) MISC 1 each every 10 days Change every 10 days. 9 each 4     Continuous Blood Gluc Transmit (DEXCOM G6 TRANSMITTER) MISC 1 each every 3 months Change every 3 months. 1 each 4     escitalopram (LEXAPRO) 20 MG tablet Take 1 tablet (20 mg) by mouth daily 90 tablet 3     gabapentin (NEURONTIN) 300 MG capsule 1-2 capsules at night as needed 60 capsule 3     insulin aspart (NOVOLOG PEN) 100 UNIT/ML pen Inject 25 Units Subcutaneous 4 times daily (with meals and nightly) 15 mL 3     insulin aspart (NOVOLOG VIAL) 100 UNITS/ML vial To be used with the insulin pump.  TDD: 150 units 50 mL 3     Insulin Infusion Pump (T: SLIM X2 INS /CONTROL 7.4) JUSTEN        Insulin Infusion Pump Supplies (AUTOSOFT 90 INFUSION SET) MISC Inject 1 each Subcutaneous every 48 hours 45 each 4     Insulin Infusion Pump Supplies (T:SLIM X2 3ML CARTRIDGE) MISC Inject 1 each Subcutaneous every 48 hours 45 each 3     insulin pen needle (30G X 8 MM) 30G X 8 MM miscellaneous Use 3 pen needles daily or as directed. 100 each 11     insulin pen needle (31G X 8 MM) 31G X 8 MM miscellaneous Use 3 pen needles daily or as directed. 100 each 11     lisinopril (ZESTRIL) 5 MG tablet Take 1 tablet (5 mg) by mouth daily 90 tablet 3     NOVOLOG VIAL 100 UNIT/ML soln TO BE USED WITH THE INSULIN PUMP TOTAL DIALY DOSE 150 UNITS 50 mL 1       No Known Allergies     Social History     Tobacco Use     Smoking status: Never     Smokeless tobacco: Never   Substance Use Topics     Alcohol use: Yes     Comment: socially     {FAMILY HISTORY (Optional):132195257}  History   Drug Use Unknown         Objective     There were " "no vitals taken for this visit.    Physical Exam  {EXAM Preop Choices:032734}    Recent Labs   Lab Test 22  1003 11/10/22  1405 10/04/22  0751 22  1507 22  1010   HGB  --   --  14.9 14.9 14.5   PLT  --   --  180 226 214   NA  --  136  --  134  --    POTASSIUM  --  4.5  --  4.2  --    CR 0.95 1.42*  --  0.74  --    A1C  --   --  6.6*  --  6.1*      ---------------------------------------------------------------------------------    RECENT CARDIAC TESTING  (Summaries only. See chart for full details.)  NM MPI Treadmil: 22   Narrative     There is no prior study for comparison.      The nuclear stress test is negative for inducible myocardial ischemia   or infarction. The left ventricular ejection fraction at rest is 75%.  The   left ventricular ejection fraction at stress is 71%. Left ventricular   function is normal.      The patient's exercise capacity is average.     Baseline electrocardiogram demonstrates sinus rhythm.   The stress electrocardiogram is negative for inducible ischemic EKG changes.  There were no arrhythmias during stress.  There were no arrhythmias during recovery.    Echocardiogram Complete 22  Interpretation Summary  Global and regional left ventricular function is normal with an EF of 60-65%.  The right ventricle is normal size.  Global right ventricular function is normal.  Aortic valve is normal in structure and function.  Trace tricuspid insufficiency is present.    -------------------------------------------------------------------------------      Diagnostics:  {LABS:013741}   {EK}    Revised Cardiac Risk Index (RCRI):  The patient has the following serious cardiovascular risks for perioperative complications:  {PREOP REVISED CARDIAC RISK INDEX (RCRI) :468467::\" - No serious cardiac risks = 0 points\"}     RCRI Interpretation: {REVISED CARDIAC RISK INTERPRETATION :873895}         Signed Electronically by: María López, CNP  Copy of this evaluation " report is provided to requesting physician.    {Provider Resources  Preop [facility] Preop Guidelines  Revised Cardiac Risk Index :994271}

## 2022-12-29 NOTE — PROGRESS NOTES
"[facility]  [address]  [facility] [address]  Phone: [phone]  Primary Provider: No Ref-Primary, Physician  Pre-op Performing Provider: KENNETH REYEZ      PREOPERATIVE EVALUATION:  Today's date: 12/29/2022    Yaya Mg is a 47 year old male who presents for a preoperative evaluation.    Surgical Information:  Surgery/Procedure: insertion of inflatable penile prosthesis  Surgery Location: [facility]  Surgeon: Dr. Hill  Surgery Date: 1/03/23  Time of Surgery: to be determined  Where patient plans to recover: At home with family  Fax number for surgical facility: Note does not need to be faxed, will be available electronically in Epic.    Type of Anesthesia Anticipated: to be determined    Assessment & Plan     The proposed surgical procedure is considered INTERMEDIATE risk.    {Diag Picklist:457158}           {Risks and Recommendations (Optional):787654}    {Medication HOLD Times for PREOP (Optional):454452}    RECOMMENDATION:  {IMPORTANT - Approval:769016::\"APPROVAL GIVEN to proceed with proposed procedure, without further diagnostic evaluation.\"}    {[facility] 2021 Documentation (Optional):471999}  {2021 E&M time (Optional):946833}  {Provider  Link to [facility] Help Grid :661662}    Subjective     HPI related to upcoming procedure: ***    Preop Questions 12/29/2022   1. Have you ever had a heart attack or stroke? No   2. Have you ever had surgery on your heart or blood vessels, such as a stent placement, a coronary artery bypass, or surgery on an artery in your head, neck, heart, or legs? No   3. Do you have chest pain with activity? No   4. Do you have a history of  heart failure? No   5. Do you currently have a cold, bronchitis or symptoms of other infection? No   6. Do you have a cough, shortness of breath, or wheezing? No   7. Do you or anyone in your family have previous history of blood clots? No   8. Do you or does anyone in your family have a " serious bleeding problem such as prolonged bleeding following surgeries or cuts? No   9. Have you ever had problems with anemia or been told to take iron pills? No   10. Have you had any abnormal blood loss such as black, tarry or bloody stools? No   11. Have you ever had a blood transfusion? No   12. Are you willing to have a blood transfusion if it is medically needed before, during, or after your surgery? Yes   13. Have you or any of your relatives ever had problems with anesthesia? No   14. Do you have sleep apnea, excessive snoring or daytime drowsiness? No   15. Do you have any artifical heart valves or other implanted medical devices like a pacemaker, defibrillator, or continuous glucose monitor? No   16. Do you have artificial joints? No   17. Are you allergic to latex? No       Health Care Directive:  Patient does not have a Health Care Directive or Living Will: {ADVANCE_DIRECTIVE_STATUS:862869}    Preoperative Review of :  {Mnpmpreport:065399}  {Review MNPMP for all patients per [facility] MNPMP Profile:553566}    {Chronic problem details (Optional) :562227}    Review of Systems  {ROS Preop Choices:983201}    Patient Active Problem List    Diagnosis Date Noted     Morbid obesity (H) 11/10/2022     Priority: Medium     Umbilical hernia without obstruction and without gangrene 11/10/2022     Priority: Medium     Burning sensation of feet 11/10/2022     Priority: Medium     Cough variant asthma 11/10/2022     Priority: Medium     FH: colon cancer in relative diagnosed at >50 years old 11/10/2022     Priority: Medium     Erectile dysfunction due to diseases classified elsewhere 12/15/2021     Priority: Medium     Type 1 diabetes mellitus without complication (H) 12/15/2021     Priority: Medium     Depression with anxiety 12/15/2021     Priority: Medium     Mixed hyperlipidemia 12/15/2021     Priority: Medium     Elevated LFTs 12/15/2021     Priority: Medium      Past Medical History:   Diagnosis Date     Anxiety   "    Depressive disorder      Hypertension      Type 1 diabetes (H)      Past Surgical History:   Procedure Laterality Date     APPENDECTOMY  01/1985     BACK SURGERY  2010    lumbar calcium deposits removed     CATARACT EXTRACTION Right 2021     Current Outpatient Medications   Medication Sig Dispense Refill     atorvastatin (LIPITOR) 20 MG tablet TAKE 1 TABLET BY MOUTH EVERY DAY 90 tablet 3     BD INSULIN SYRINGE ULTRAFINE 30G X 1/2\" 1 ML miscellaneous USE AS NEEDED 100 each 5     budesonide-formoterol (SYMBICORT) 80-4.5 MCG/ACT Inhaler Inhale 2 puffs into the lungs 2 times daily 10 g 3     Continuous Blood Gluc  (DEXCOM G6 ) JUSTEN        Continuous Blood Gluc Sensor (DEXCOM G6 SENSOR) MISC 1 each every 10 days Change every 10 days. 9 each 4     Continuous Blood Gluc Transmit (DEXCOM G6 TRANSMITTER) MISC 1 each every 3 months Change every 3 months. 1 each 4     escitalopram (LEXAPRO) 20 MG tablet Take 1 tablet (20 mg) by mouth daily 90 tablet 3     gabapentin (NEURONTIN) 300 MG capsule 1-2 capsules at night as needed 60 capsule 3     insulin aspart (NOVOLOG PEN) 100 UNIT/ML pen Inject 25 Units Subcutaneous 4 times daily (with meals and nightly) 15 mL 3     insulin aspart (NOVOLOG VIAL) 100 UNITS/ML vial To be used with the insulin pump.  TDD: 150 units 50 mL 3     Insulin Infusion Pump (T: SLIM X2 INS /CONTROL 7.4) JUSTEN        Insulin Infusion Pump Supplies (AUTOSOFT 90 INFUSION SET) MISC Inject 1 each Subcutaneous every 48 hours 45 each 4     Insulin Infusion Pump Supplies (T:SLIM X2 3ML CARTRIDGE) MISC Inject 1 each Subcutaneous every 48 hours 45 each 3     insulin pen needle (30G X 8 MM) 30G X 8 MM miscellaneous Use 3 pen needles daily or as directed. 100 each 11     lisinopril (ZESTRIL) 5 MG tablet Take 1 tablet (5 mg) by mouth daily 90 tablet 3     albuterol (PROAIR HFA/PROVENTIL HFA/VENTOLIN HFA) 108 (90 Base) MCG/ACT inhaler Inhale 2 puffs into the lungs every 6 hours (Patient not taking: " "Reported on 2022) 18 g 4     aspirin (ASA) 81 MG chewable tablet Take 1 tablet (81 mg) by mouth daily (Patient not taking: Reported on 2022) 90 tablet 3     insulin pen needle (31G X 8 MM) 31G X 8 MM miscellaneous Use 3 pen needles daily or as directed. 100 each 11     NOVOLOG VIAL 100 UNIT/ML soln TO BE USED WITH THE INSULIN PUMP TOTAL DIALY DOSE 150 UNITS 50 mL 1       No Known Allergies     Social History     Tobacco Use     Smoking status: Never     Smokeless tobacco: Never   Substance Use Topics     Alcohol use: Yes     Comment: socially     {FAMILY HISTORY (Optional):332896658}  History   Drug Use Unknown         Objective     /76 (BP Location: Right arm, Patient Position: Sitting, Cuff Size: Adult Large)   Pulse 80   Temp 98  F (36.7  C) (Tympanic)   Ht 1.753 m (5' 9\")   Wt 104.3 kg (230 lb)   SpO2 97%   BMI 33.97 kg/m      Physical Exam  {EXAM Preop Choices:999966}    Recent Labs   Lab Test 22  1003 11/10/22  1405 10/04/22  0751 22  1507 22  1010   HGB  --   --  14.9 14.9 14.5   PLT  --   --  180 226 214   NA  --  136  --  134  --    POTASSIUM  --  4.5  --  4.2  --    CR 0.95 1.42*  --  0.74  --    A1C  --   --  6.6*  --  6.1*        Diagnostics:  {LABS:029571}   {EK}    Revised Cardiac Risk Index (RCRI):  The patient has the following serious cardiovascular risks for perioperative complications:  {PREOP REVISED CARDIAC RISK INDEX (RCRI) :693501::\" - No serious cardiac risks = 0 points\"}     RCRI Interpretation: {REVISED CARDIAC RISK INTERPRETATION :374060}         Signed Electronically by: María López CNP  Copy of this evaluation report is provided to requesting physician.    {Provider Resources  Preop [facility] Preop Guidelines  Revised Cardiac Risk Index :232398}  "

## 2022-12-30 NOTE — PROGRESS NOTES
[facility]  [address]  [facility] [address]  Phone: [phone]  Primary Provider: No Ref-Primary, Physician  Pre-op Performing Provider: KENNETH REYEZ    PREOPERATIVE EVALUATION:  Today's date: 12/29/2022    Yaya Mg is a 47 year old male who presents for a preoperative evaluation.    Surgical Information:  Surgery/Procedure: insertion of inflatable penile prosthesis  Surgery Location: [facility]  Surgeon: Dr. Hill  Surgery Date: 1/03/23  Time of Surgery: to be determined  Where patient plans to recover: At home with family  Fax number for surgical facility: Note does not need to be faxed, will be available electronically in Epic.    Type of Anesthesia Anticipated: to be determined    Assessment & Plan     The proposed surgical procedure is considered INTERMEDIATE risk.    1. Preop general physical exam  - CBC with platelets; Future  - Basic metabolic panel; Future  - EKG 12-lead complete w/read - (Clinic Performed)  - CBC with platelets  - Basic metabolic panel    2. Erectile dysfunction, unspecified erectile dysfunction type    3. Type 1 diabetes mellitus without complication (H)  - HEMOGLOBIN A1C; Future  - HEMOGLOBIN A1C  - insulin aspart (NOVOLOG VIAL) 100 UNITS/ML vial; TO BE USED WITH THE INSULIN PUMP TOTAL DIALY DOSE 150 UNITS Strength: 100 UNIT/ML  Dispense: 50 mL; Refill: 3    4. Morbid obesity (H)    5. Screening for HIV (human immunodeficiency virus)    6. Need for hepatitis C screening test       Gabriel currently working with PCP on dx of asthma. Normal PFT.         Medication Instructions:   - Insulin pump: Continue basal rate of insulin up until the time of surgery.   - Continuous Glucose Monitor (CGM): Patient was made aware on the day of surgery, they should be prepared to remove the Continuous Glucose Monitor (CGM) prior to the operation in order to avoid damage to the equipment during the procedure. The CGM will not be the  source of glucose monitoring during the operation.    RECOMMENDATION:  APPROVAL GIVEN to proceed with proposed procedure, without further diagnostic evaluation.    Ordering of each unique test  Prescription drug management    Subjective     HPI related to upcoming procedure: Gabriel is a patient of Dr. Ashraf with diagnosis of ED.     Preop Questions 12/29/2022   1. Have you ever had a heart attack or stroke? No   2. Have you ever had surgery on your heart or blood vessels, such as a stent placement, a coronary artery bypass, or surgery on an artery in your head, neck, heart, or legs? No   3. Do you have chest pain with activity? No   4. Do you have a history of  heart failure? No   5. Do you currently have a cold, bronchitis or symptoms of other infection? No   6. Do you have a cough, shortness of breath, or wheezing? No   7. Do you or anyone in your family have previous history of blood clots? No   8. Do you or does anyone in your family have a serious bleeding problem such as prolonged bleeding following surgeries or cuts? No   9. Have you ever had problems with anemia or been told to take iron pills? No   10. Have you had any abnormal blood loss such as black, tarry or bloody stools? No   11. Have you ever had a blood transfusion? No   12. Are you willing to have a blood transfusion if it is medically needed before, during, or after your surgery? Yes   13. Have you or any of your relatives ever had problems with anesthesia? No   14. Do you have sleep apnea, excessive snoring or daytime drowsiness? No   15. Do you have any artifical heart valves or other implanted medical devices like a pacemaker, defibrillator, or continuous glucose monitor? No   16. Do you have artificial joints? No   17. Are you allergic to latex? No       Health Care Directive:  Patient does not have a Health Care Directive or Living Will: Discussed advance care planning with patient; information given to patient to review.    Preoperative  "Review of :   reviewed - no record of controlled substances prescribed.    Review of Systems  Constitutional, neuro, ENT, endocrine, pulmonary, cardiac, gastrointestinal, genitourinary, musculoskeletal, integument and psychiatric systems are negative, except as otherwise noted.    Patient Active Problem List    Diagnosis Date Noted     Morbid obesity (H) 11/10/2022     Priority: Medium     Umbilical hernia without obstruction and without gangrene 11/10/2022     Priority: Medium     Burning sensation of feet 11/10/2022     Priority: Medium     Cough variant asthma 11/10/2022     Priority: Medium     FH: colon cancer in relative diagnosed at >50 years old 11/10/2022     Priority: Medium     Erectile dysfunction due to diseases classified elsewhere 12/15/2021     Priority: Medium     Type 1 diabetes mellitus without complication (H) 12/15/2021     Priority: Medium     Depression with anxiety 12/15/2021     Priority: Medium     Mixed hyperlipidemia 12/15/2021     Priority: Medium     Elevated LFTs 12/15/2021     Priority: Medium      Past Medical History:   Diagnosis Date     Anxiety      Depressive disorder      Hypertension      Type 1 diabetes (H)      Past Surgical History:   Procedure Laterality Date     APPENDECTOMY  01/1985     BACK SURGERY  2010    lumbar calcium deposits removed     CATARACT EXTRACTION Right 2021     Current Outpatient Medications   Medication Sig Dispense Refill     atorvastatin (LIPITOR) 20 MG tablet TAKE 1 TABLET BY MOUTH EVERY DAY 90 tablet 3     BD INSULIN SYRINGE ULTRAFINE 30G X 1/2\" 1 ML miscellaneous USE AS NEEDED 100 each 5     budesonide-formoterol (SYMBICORT) 80-4.5 MCG/ACT Inhaler Inhale 2 puffs into the lungs 2 times daily 10 g 3     Continuous Blood Gluc  (DEXCOM G6 ) JUSTEN        Continuous Blood Gluc Sensor (DEXCOM G6 SENSOR) MISC 1 each every 10 days Change every 10 days. 9 each 4     Continuous Blood Gluc Transmit (DEXCOM G6 TRANSMITTER) MISC 1 each every " "3 months Change every 3 months. 1 each 4     escitalopram (LEXAPRO) 20 MG tablet Take 1 tablet (20 mg) by mouth daily 90 tablet 3     gabapentin (NEURONTIN) 300 MG capsule 1-2 capsules at night as needed 60 capsule 3     insulin aspart (NOVOLOG VIAL) 100 UNITS/ML vial TO BE USED WITH THE INSULIN PUMP TOTAL DIALY DOSE 150 UNITS Strength: 100 UNIT/ML 50 mL 3     Insulin Infusion Pump (T: SLIM X2 INS /CONTROL 7.4) JUSTEN        Insulin Infusion Pump Supplies (AUTOSOFT 90 INFUSION SET) MISC Inject 1 each Subcutaneous every 48 hours 45 each 4     Insulin Infusion Pump Supplies (T:SLIM X2 3ML CARTRIDGE) MISC Inject 1 each Subcutaneous every 48 hours 45 each 3     lisinopril (ZESTRIL) 5 MG tablet Take 1 tablet (5 mg) by mouth daily 90 tablet 3     albuterol (PROAIR HFA/PROVENTIL HFA/VENTOLIN HFA) 108 (90 Base) MCG/ACT inhaler Inhale 2 puffs into the lungs every 6 hours (Patient not taking: Reported on 12/29/2022) 18 g 4     insulin pen needle (31G X 8 MM) 31G X 8 MM miscellaneous Use 3 pen needles daily or as directed. 100 each 11       No Known Allergies     Social History     Tobacco Use     Smoking status: Never     Smokeless tobacco: Never   Substance Use Topics     Alcohol use: Yes     Comment: socially     History   Drug Use Unknown         Objective     /76 (BP Location: Right arm, Patient Position: Sitting, Cuff Size: Adult Large)   Pulse 80   Temp 98  F (36.7  C) (Tympanic)   Ht 1.753 m (5' 9\")   Wt 104.3 kg (230 lb)   SpO2 97%   BMI 33.97 kg/m      Physical Exam    GENERAL APPEARANCE: healthy, alert and no distress     EYES: EOMI,  PERRL     HENT: ear canals and TM's normal and nose and mouth without ulcers or lesions     NECK: no adenopathy, no asymmetry, masses, or scars and thyroid normal to palpation     RESP: lungs clear to auscultation - no rales, rhonchi or wheezes     CV: regular rates and rhythm, normal S1 S2, no S3 or S4 and no murmur, click or rub     ABDOMEN:  soft, nontender, no HSM or " masses and bowel sounds normal     MS: extremities normal- no gross deformities noted, no evidence of inflammation in joints, FROM in all extremities.     SKIN: no suspicious lesions or rashes     NEURO: Normal strength and tone, sensory exam grossly normal, mentation intact and speech normal     PSYCH: mentation appears normal. and affect normal/bright     LYMPHATICS: No cervical adenopathy    Recent Labs   Lab Test 12/08/22  1003 11/10/22  1405 10/04/22  0751 05/24/22  1507 05/18/22  1010   HGB  --   --  14.9 14.9 14.5   PLT  --   --  180 226 214   NA  --  136  --  134  --    POTASSIUM  --  4.5  --  4.2  --    CR 0.95 1.42*  --  0.74  --    A1C  --   --  6.6*  --  6.1*        Diagnostics:  Results for orders placed or performed in visit on 12/29/22   CBC with platelets     Status: Abnormal   Result Value Ref Range    WBC Count 4.6 4.0 - 11.0 10e3/uL    RBC Count 4.84 4.40 - 5.90 10e6/uL    Hemoglobin 14.9 13.3 - 17.7 g/dL    Hematocrit 40.3 40.0 - 53.0 %    MCV 83 78 - 100 fL    MCH 30.8 26.5 - 33.0 pg    MCHC 37.0 (H) 31.5 - 36.5 g/dL    RDW 13.1 10.0 - 15.0 %    Platelet Count 202 150 - 450 10e3/uL   Basic metabolic panel     Status: Abnormal   Result Value Ref Range    Sodium 135 (L) 136 - 145 mmol/L    Potassium 4.0 3.4 - 5.3 mmol/L    Chloride 97 (L) 98 - 107 mmol/L    Carbon Dioxide (CO2) 25 22 - 29 mmol/L    Anion Gap 13 7 - 15 mmol/L    Urea Nitrogen 18.0 6.0 - 20.0 mg/dL    Creatinine 0.90 0.67 - 1.17 mg/dL    Calcium 9.4 8.6 - 10.0 mg/dL    Glucose 169 (H) 70 - 99 mg/dL    GFR Estimate >90 >60 mL/min/1.73m2   HEMOGLOBIN A1C     Status: Abnormal   Result Value Ref Range    Estimated Average Glucose 137 mg/dL    Hemoglobin A1C 6.4 (H) <5.7 %   Extra Tube     Status: None    Narrative    The following orders were created for panel order Extra Tube.  Procedure                               Abnormality         Status                     ---------                               -----------         ------                      Extra Serum Separator Tu...[[id number]]                      Final result                 Please view results for these tests on the individual orders.   Extra Serum Separator Tube (SST)     Status: None   Result Value Ref Range    Hold Specimen JIC         EKG: appears normal, NSR, normal axis, normal intervals, no acute ST/T changes c/w ischemia, no LVH by voltage criteria, unchanged from previous tracings    Revised Cardiac Risk Index (RCRI):  The patient has the following serious cardiovascular risks for perioperative complications:   - No serious cardiac risks = 0 points     RCRI Interpretation: 0 points: Class I (very low risk - 0.4% complication rate)           Signed Electronically by: María López CNP  Copy of this evaluation report is provided to requesting physician.

## 2023-01-02 ENCOUNTER — ANESTHESIA EVENT (OUTPATIENT)
Dept: SURGERY | Facility: CLINIC | Age: 48
End: 2023-01-02
Payer: COMMERCIAL

## 2023-01-03 ENCOUNTER — ANESTHESIA (OUTPATIENT)
Dept: SURGERY | Facility: CLINIC | Age: 48
End: 2023-01-03
Payer: COMMERCIAL

## 2023-01-03 ENCOUNTER — HOSPITAL ENCOUNTER (OUTPATIENT)
Facility: CLINIC | Age: 48
LOS: 1 days | Discharge: HOME | End: 2023-01-04
Attending: UROLOGY | Admitting: UROLOGY
Payer: COMMERCIAL

## 2023-01-03 DIAGNOSIS — N52.1 ERECTILE DYSFUNCTION DUE TO DISEASES CLASSIFIED ELSEWHERE: Primary | ICD-10-CM

## 2023-01-03 LAB
GLUCOSE BLDC GLUCOMTR-MCNC: 102 MG/DL (ref 70–99)
GLUCOSE BLDC GLUCOMTR-MCNC: 105 MG/DL (ref 70–99)
GLUCOSE BLDC GLUCOMTR-MCNC: 115 MG/DL (ref 70–99)
GLUCOSE BLDC GLUCOMTR-MCNC: 117 MG/DL (ref 70–99)
GLUCOSE BLDC GLUCOMTR-MCNC: 137 MG/DL (ref 70–99)
GLUCOSE BLDC GLUCOMTR-MCNC: 151 MG/DL (ref 70–99)
GLUCOSE BLDC GLUCOMTR-MCNC: 187 MG/DL (ref 70–99)
GLUCOSE BLDC GLUCOMTR-MCNC: 192 MG/DL (ref 70–99)
GLUCOSE BLDC GLUCOMTR-MCNC: 194 MG/DL (ref 70–99)
GLUCOSE BLDC GLUCOMTR-MCNC: 226 MG/DL (ref 70–99)
GLUCOSE BLDC GLUCOMTR-MCNC: 229 MG/DL (ref 70–99)
GLUCOSE BLDC GLUCOMTR-MCNC: 253 MG/DL (ref 70–99)
GLUCOSE BLDC GLUCOMTR-MCNC: 292 MG/DL (ref 70–99)
GLUCOSE BLDC GLUCOMTR-MCNC: 68 MG/DL (ref 70–99)
GLUCOSE BLDC GLUCOMTR-MCNC: 77 MG/DL (ref 70–99)
GLUCOSE BLDC GLUCOMTR-MCNC: 85 MG/DL (ref 70–99)
GLUCOSE BLDC GLUCOMTR-MCNC: 90 MG/DL (ref 70–99)

## 2023-01-03 PROCEDURE — 250N000011 HC RX IP 250 OP 636: Performed by: NURSE ANESTHETIST, CERTIFIED REGISTERED

## 2023-01-03 PROCEDURE — C1813 PROSTHESIS, PENILE, INFLATAB: HCPCS | Performed by: UROLOGY

## 2023-01-03 PROCEDURE — 278N000051 HC OR IMPLANT GENERAL: Performed by: UROLOGY

## 2023-01-03 PROCEDURE — 99207 PR NO BILLABLE SERVICE THIS VISIT: CPT | Performed by: PHYSICIAN ASSISTANT

## 2023-01-03 PROCEDURE — 258N000003 HC RX IP 258 OP 636: Performed by: STUDENT IN AN ORGANIZED HEALTH CARE EDUCATION/TRAINING PROGRAM

## 2023-01-03 PROCEDURE — 258N000003 HC RX IP 258 OP 636: Performed by: NURSE ANESTHETIST, CERTIFIED REGISTERED

## 2023-01-03 PROCEDURE — 250N000013 HC RX MED GY IP 250 OP 250 PS 637: Performed by: STUDENT IN AN ORGANIZED HEALTH CARE EDUCATION/TRAINING PROGRAM

## 2023-01-03 PROCEDURE — 250N000013 HC RX MED GY IP 250 OP 250 PS 637: Performed by: ANESTHESIOLOGY

## 2023-01-03 PROCEDURE — 999N000141 HC STATISTIC PRE-PROCEDURE NURSING ASSESSMENT: Performed by: UROLOGY

## 2023-01-03 PROCEDURE — 250N000009 HC RX 250: Performed by: UROLOGY

## 2023-01-03 PROCEDURE — 370N000017 HC ANESTHESIA TECHNICAL FEE, PER MIN: Performed by: UROLOGY

## 2023-01-03 PROCEDURE — 258N000003 HC RX IP 258 OP 636: Performed by: UROLOGY

## 2023-01-03 PROCEDURE — 250N000025 HC SEVOFLURANE, PER MIN: Performed by: UROLOGY

## 2023-01-03 PROCEDURE — 710N000010 HC RECOVERY PHASE 1, LEVEL 2, PER MIN: Performed by: UROLOGY

## 2023-01-03 PROCEDURE — 360N000076 HC SURGERY LEVEL 3, PER MIN: Performed by: UROLOGY

## 2023-01-03 PROCEDURE — 250N000009 HC RX 250: Performed by: ANESTHESIOLOGY

## 2023-01-03 PROCEDURE — 54405 INSERT MULTI-COMP PENIS PROS: CPT | Mod: GC | Performed by: UROLOGY

## 2023-01-03 PROCEDURE — 250N000011 HC RX IP 250 OP 636: Performed by: UROLOGY

## 2023-01-03 PROCEDURE — 250N000009 HC RX 250: Performed by: NURSE ANESTHETIST, CERTIFIED REGISTERED

## 2023-01-03 PROCEDURE — 250N000011 HC RX IP 250 OP 636: Performed by: ANESTHESIOLOGY

## 2023-01-03 PROCEDURE — 250N000009 HC RX 250: Performed by: PHYSICIAN ASSISTANT

## 2023-01-03 PROCEDURE — 82962 GLUCOSE BLOOD TEST: CPT

## 2023-01-03 PROCEDURE — 272N000001 HC OR GENERAL SUPPLY STERILE: Performed by: UROLOGY

## 2023-01-03 DEVICE — IMP PROSTHESIS PENILE TITAN STD ASSEMBLY KIT 91-9480SC: Type: IMPLANTABLE DEVICE | Site: SCROTUM | Status: FUNCTIONAL

## 2023-01-03 DEVICE — CL RESERVOIR
Type: IMPLANTABLE DEVICE | Site: ABDOMEN | Status: FUNCTIONAL
Brand: TITAN

## 2023-01-03 DEVICE — IMPLANTABLE DEVICE: Type: IMPLANTABLE DEVICE | Site: PENIS | Status: FUNCTIONAL

## 2023-01-03 RX ORDER — DEXTROSE MONOHYDRATE 100 MG/ML
INJECTION, SOLUTION INTRAVENOUS CONTINUOUS PRN
Status: DISCONTINUED | OUTPATIENT
Start: 2023-01-03 | End: 2023-01-03 | Stop reason: HOSPADM

## 2023-01-03 RX ORDER — HYDROMORPHONE HYDROCHLORIDE 1 MG/ML
0.2 INJECTION, SOLUTION INTRAMUSCULAR; INTRAVENOUS; SUBCUTANEOUS EVERY 5 MIN PRN
Status: DISCONTINUED | OUTPATIENT
Start: 2023-01-03 | End: 2023-01-03

## 2023-01-03 RX ORDER — OXYCODONE HYDROCHLORIDE 10 MG/1
10 TABLET ORAL EVERY 4 HOURS PRN
Status: DISCONTINUED | OUTPATIENT
Start: 2023-01-03 | End: 2023-01-03

## 2023-01-03 RX ORDER — PROCHLORPERAZINE MALEATE 10 MG
10 TABLET ORAL EVERY 6 HOURS PRN
Status: DISCONTINUED | OUTPATIENT
Start: 2023-01-03 | End: 2023-01-04 | Stop reason: HOSPADM

## 2023-01-03 RX ORDER — SULFAMETHOXAZOLE/TRIMETHOPRIM 800-160 MG
1 TABLET ORAL 2 TIMES DAILY
Status: DISCONTINUED | OUTPATIENT
Start: 2023-01-03 | End: 2023-01-04 | Stop reason: HOSPADM

## 2023-01-03 RX ORDER — CEFAZOLIN SODIUM/WATER 2 G/20 ML
2 SYRINGE (ML) INTRAVENOUS SEE ADMIN INSTRUCTIONS
Status: DISCONTINUED | OUTPATIENT
Start: 2023-01-03 | End: 2023-01-03 | Stop reason: HOSPADM

## 2023-01-03 RX ORDER — ALBUTEROL SULFATE 90 UG/1
2 AEROSOL, METERED RESPIRATORY (INHALATION) EVERY 6 HOURS
Status: DISCONTINUED | OUTPATIENT
Start: 2023-01-03 | End: 2023-01-03

## 2023-01-03 RX ORDER — BACITRACIN ZINC 500 [USP'U]/G
OINTMENT TOPICAL PRN
Status: DISCONTINUED | OUTPATIENT
Start: 2023-01-03 | End: 2023-01-03 | Stop reason: HOSPADM

## 2023-01-03 RX ORDER — SODIUM CHLORIDE 9 MG/ML
INJECTION, SOLUTION INTRAVENOUS
Status: COMPLETED
Start: 2023-01-03 | End: 2023-01-03

## 2023-01-03 RX ORDER — SODIUM CHLORIDE 9 MG/ML
INJECTION, SOLUTION INTRAVENOUS CONTINUOUS
Status: DISCONTINUED | OUTPATIENT
Start: 2023-01-03 | End: 2023-01-04

## 2023-01-03 RX ORDER — SODIUM CHLORIDE, SODIUM LACTATE, POTASSIUM CHLORIDE, CALCIUM CHLORIDE 600; 310; 30; 20 MG/100ML; MG/100ML; MG/100ML; MG/100ML
INJECTION, SOLUTION INTRAVENOUS CONTINUOUS
Status: DISCONTINUED | OUTPATIENT
Start: 2023-01-03 | End: 2023-01-03 | Stop reason: HOSPADM

## 2023-01-03 RX ORDER — ONDANSETRON 2 MG/ML
INJECTION INTRAMUSCULAR; INTRAVENOUS PRN
Status: DISCONTINUED | OUTPATIENT
Start: 2023-01-03 | End: 2023-01-03

## 2023-01-03 RX ORDER — HYDROMORPHONE HYDROCHLORIDE 1 MG/ML
0.4 INJECTION, SOLUTION INTRAMUSCULAR; INTRAVENOUS; SUBCUTANEOUS EVERY 5 MIN PRN
Status: DISCONTINUED | OUTPATIENT
Start: 2023-01-03 | End: 2023-01-03

## 2023-01-03 RX ORDER — SULFAMETHOXAZOLE/TRIMETHOPRIM 800-160 MG
1 TABLET ORAL 2 TIMES DAILY
Qty: 14 TABLET | Refills: 0 | Status: SHIPPED | OUTPATIENT
Start: 2023-01-03 | End: 2023-01-10

## 2023-01-03 RX ORDER — ONDANSETRON 4 MG/1
4 TABLET, ORALLY DISINTEGRATING ORAL EVERY 6 HOURS PRN
Status: DISCONTINUED | OUTPATIENT
Start: 2023-01-03 | End: 2023-01-04 | Stop reason: HOSPADM

## 2023-01-03 RX ORDER — BUPIVACAINE HYDROCHLORIDE 5 MG/ML
INJECTION, SOLUTION PERINEURAL PRN
Status: DISCONTINUED | OUTPATIENT
Start: 2023-01-03 | End: 2023-01-03 | Stop reason: HOSPADM

## 2023-01-03 RX ORDER — LISINOPRIL 5 MG/1
5 TABLET ORAL DAILY
Status: DISCONTINUED | OUTPATIENT
Start: 2023-01-04 | End: 2023-01-04 | Stop reason: HOSPADM

## 2023-01-03 RX ORDER — NALOXONE HYDROCHLORIDE 0.4 MG/ML
0.4 INJECTION, SOLUTION INTRAMUSCULAR; INTRAVENOUS; SUBCUTANEOUS
Status: DISCONTINUED | OUTPATIENT
Start: 2023-01-03 | End: 2023-01-04 | Stop reason: HOSPADM

## 2023-01-03 RX ORDER — AMOXICILLIN 250 MG
1-2 CAPSULE ORAL 2 TIMES DAILY
Qty: 30 TABLET | Refills: 0 | Status: SHIPPED | OUTPATIENT
Start: 2023-01-03 | End: 2023-04-12

## 2023-01-03 RX ORDER — HYDROMORPHONE HYDROCHLORIDE 1 MG/ML
0.4 INJECTION, SOLUTION INTRAMUSCULAR; INTRAVENOUS; SUBCUTANEOUS
Status: DISCONTINUED | OUTPATIENT
Start: 2023-01-03 | End: 2023-01-04 | Stop reason: HOSPADM

## 2023-01-03 RX ORDER — LIDOCAINE 40 MG/G
CREAM TOPICAL
Status: DISCONTINUED | OUTPATIENT
Start: 2023-01-03 | End: 2023-01-04 | Stop reason: HOSPADM

## 2023-01-03 RX ORDER — NICOTINE POLACRILEX 4 MG
15-30 LOZENGE BUCCAL
Status: CANCELLED | OUTPATIENT
Start: 2023-01-03

## 2023-01-03 RX ORDER — SODIUM CHLORIDE 9 MG/ML
INJECTION, SOLUTION INTRAVENOUS CONTINUOUS PRN
Status: DISCONTINUED | OUTPATIENT
Start: 2023-01-03 | End: 2023-01-03

## 2023-01-03 RX ORDER — CALCIUM CARBONATE 500 MG/1
500 TABLET, CHEWABLE ORAL 4 TIMES DAILY PRN
Status: DISCONTINUED | OUTPATIENT
Start: 2023-01-03 | End: 2023-01-04 | Stop reason: HOSPADM

## 2023-01-03 RX ORDER — ONDANSETRON 2 MG/ML
4 INJECTION INTRAMUSCULAR; INTRAVENOUS EVERY 30 MIN PRN
Status: DISCONTINUED | OUTPATIENT
Start: 2023-01-03 | End: 2023-01-03

## 2023-01-03 RX ORDER — NICOTINE POLACRILEX 4 MG
15-30 LOZENGE BUCCAL
Status: DISCONTINUED | OUTPATIENT
Start: 2023-01-03 | End: 2023-01-03 | Stop reason: HOSPADM

## 2023-01-03 RX ORDER — OXYCODONE HYDROCHLORIDE 5 MG/1
5 TABLET ORAL EVERY 4 HOURS PRN
Status: DISCONTINUED | OUTPATIENT
Start: 2023-01-03 | End: 2023-01-04 | Stop reason: HOSPADM

## 2023-01-03 RX ORDER — DEXTROSE MONOHYDRATE 25 G/50ML
25-50 INJECTION, SOLUTION INTRAVENOUS
Status: DISCONTINUED | OUTPATIENT
Start: 2023-01-03 | End: 2023-01-03 | Stop reason: HOSPADM

## 2023-01-03 RX ORDER — DEXTROSE MONOHYDRATE 100 MG/ML
INJECTION, SOLUTION INTRAVENOUS CONTINUOUS PRN
Status: DISCONTINUED | OUTPATIENT
Start: 2023-01-03 | End: 2023-01-04 | Stop reason: HOSPADM

## 2023-01-03 RX ORDER — LIDOCAINE 40 MG/G
CREAM TOPICAL
Status: DISCONTINUED | OUTPATIENT
Start: 2023-01-03 | End: 2023-01-03 | Stop reason: HOSPADM

## 2023-01-03 RX ORDER — FENTANYL CITRATE 50 UG/ML
50 INJECTION, SOLUTION INTRAMUSCULAR; INTRAVENOUS EVERY 5 MIN PRN
Status: DISCONTINUED | OUTPATIENT
Start: 2023-01-03 | End: 2023-01-03

## 2023-01-03 RX ORDER — ACETAMINOPHEN 325 MG/1
975 TABLET ORAL 3 TIMES DAILY
Status: DISCONTINUED | OUTPATIENT
Start: 2023-01-03 | End: 2023-01-04 | Stop reason: HOSPADM

## 2023-01-03 RX ORDER — FENTANYL CITRATE 50 UG/ML
INJECTION, SOLUTION INTRAMUSCULAR; INTRAVENOUS PRN
Status: DISCONTINUED | OUTPATIENT
Start: 2023-01-03 | End: 2023-01-03

## 2023-01-03 RX ORDER — HYDROMORPHONE HYDROCHLORIDE 1 MG/ML
0.2 INJECTION, SOLUTION INTRAMUSCULAR; INTRAVENOUS; SUBCUTANEOUS
Status: DISCONTINUED | OUTPATIENT
Start: 2023-01-03 | End: 2023-01-04 | Stop reason: HOSPADM

## 2023-01-03 RX ORDER — ESCITALOPRAM OXALATE 20 MG/1
20 TABLET ORAL DAILY
Status: DISCONTINUED | OUTPATIENT
Start: 2023-01-03 | End: 2023-01-04 | Stop reason: HOSPADM

## 2023-01-03 RX ORDER — DEXTROSE MONOHYDRATE 25 G/50ML
25-50 INJECTION, SOLUTION INTRAVENOUS
Status: CANCELLED | OUTPATIENT
Start: 2023-01-03

## 2023-01-03 RX ORDER — NALOXONE HYDROCHLORIDE 0.4 MG/ML
0.2 INJECTION, SOLUTION INTRAMUSCULAR; INTRAVENOUS; SUBCUTANEOUS
Status: DISCONTINUED | OUTPATIENT
Start: 2023-01-03 | End: 2023-01-04 | Stop reason: HOSPADM

## 2023-01-03 RX ORDER — SODIUM CHLORIDE, SODIUM LACTATE, POTASSIUM CHLORIDE, CALCIUM CHLORIDE 600; 310; 30; 20 MG/100ML; MG/100ML; MG/100ML; MG/100ML
INJECTION, SOLUTION INTRAVENOUS CONTINUOUS
Status: DISCONTINUED | OUTPATIENT
Start: 2023-01-03 | End: 2023-01-03

## 2023-01-03 RX ORDER — OXYCODONE HYDROCHLORIDE 10 MG/1
10 TABLET ORAL EVERY 4 HOURS PRN
Status: DISCONTINUED | OUTPATIENT
Start: 2023-01-03 | End: 2023-01-04 | Stop reason: HOSPADM

## 2023-01-03 RX ORDER — PROPOFOL 10 MG/ML
INJECTION, EMULSION INTRAVENOUS PRN
Status: DISCONTINUED | OUTPATIENT
Start: 2023-01-03 | End: 2023-01-03

## 2023-01-03 RX ORDER — GLYCINE 1.5 G/100ML
SOLUTION IRRIGATION PRN
Status: DISCONTINUED | OUTPATIENT
Start: 2023-01-03 | End: 2023-01-03 | Stop reason: HOSPADM

## 2023-01-03 RX ORDER — DEXTROSE, SODIUM CHLORIDE, SODIUM LACTATE, POTASSIUM CHLORIDE, AND CALCIUM CHLORIDE 5; .6; .31; .03; .02 G/100ML; G/100ML; G/100ML; G/100ML; G/100ML
INJECTION, SOLUTION INTRAVENOUS CONTINUOUS PRN
Status: DISCONTINUED | OUTPATIENT
Start: 2023-01-03 | End: 2023-01-03

## 2023-01-03 RX ORDER — ONDANSETRON 2 MG/ML
4 INJECTION INTRAMUSCULAR; INTRAVENOUS EVERY 6 HOURS PRN
Status: DISCONTINUED | OUTPATIENT
Start: 2023-01-03 | End: 2023-01-04 | Stop reason: HOSPADM

## 2023-01-03 RX ORDER — OXYCODONE HYDROCHLORIDE 5 MG/1
5 TABLET ORAL EVERY 4 HOURS PRN
Status: DISCONTINUED | OUTPATIENT
Start: 2023-01-03 | End: 2023-01-03

## 2023-01-03 RX ORDER — GABAPENTIN 300 MG/1
300 CAPSULE ORAL
Status: DISCONTINUED | OUTPATIENT
Start: 2023-01-03 | End: 2023-01-04 | Stop reason: HOSPADM

## 2023-01-03 RX ORDER — ALBUTEROL SULFATE 90 UG/1
2 AEROSOL, METERED RESPIRATORY (INHALATION) EVERY 6 HOURS PRN
Status: DISCONTINUED | OUTPATIENT
Start: 2023-01-03 | End: 2023-01-04 | Stop reason: HOSPADM

## 2023-01-03 RX ORDER — OXYCODONE HYDROCHLORIDE 5 MG/1
5 TABLET ORAL EVERY 6 HOURS PRN
Qty: 15 TABLET | Refills: 0 | Status: SHIPPED | OUTPATIENT
Start: 2023-01-03 | End: 2023-01-06

## 2023-01-03 RX ORDER — DEXTROSE MONOHYDRATE 25 G/50ML
25-50 INJECTION, SOLUTION INTRAVENOUS
Status: DISCONTINUED | OUTPATIENT
Start: 2023-01-03 | End: 2023-01-04

## 2023-01-03 RX ORDER — NICOTINE POLACRILEX 4 MG
15-30 LOZENGE BUCCAL
Status: DISCONTINUED | OUTPATIENT
Start: 2023-01-03 | End: 2023-01-04

## 2023-01-03 RX ORDER — ACETAMINOPHEN 325 MG/1
975 TABLET ORAL ONCE
Status: COMPLETED | OUTPATIENT
Start: 2023-01-03 | End: 2023-01-03

## 2023-01-03 RX ORDER — LIDOCAINE HYDROCHLORIDE 20 MG/ML
INJECTION, SOLUTION INFILTRATION; PERINEURAL PRN
Status: DISCONTINUED | OUTPATIENT
Start: 2023-01-03 | End: 2023-01-03

## 2023-01-03 RX ORDER — ONDANSETRON 4 MG/1
4 TABLET, ORALLY DISINTEGRATING ORAL EVERY 30 MIN PRN
Status: DISCONTINUED | OUTPATIENT
Start: 2023-01-03 | End: 2023-01-03

## 2023-01-03 RX ORDER — FENTANYL CITRATE 50 UG/ML
25 INJECTION, SOLUTION INTRAMUSCULAR; INTRAVENOUS EVERY 5 MIN PRN
Status: DISCONTINUED | OUTPATIENT
Start: 2023-01-03 | End: 2023-01-03

## 2023-01-03 RX ORDER — FLUTICASONE FUROATE AND VILANTEROL 100; 25 UG/1; UG/1
1 POWDER RESPIRATORY (INHALATION) DAILY
Status: DISCONTINUED | OUTPATIENT
Start: 2023-01-03 | End: 2023-01-03

## 2023-01-03 RX ORDER — CEFAZOLIN SODIUM/WATER 2 G/20 ML
2 SYRINGE (ML) INTRAVENOUS
Status: COMPLETED | OUTPATIENT
Start: 2023-01-03 | End: 2023-01-03

## 2023-01-03 RX ORDER — ATORVASTATIN CALCIUM 20 MG/1
20 TABLET, FILM COATED ORAL DAILY
Status: DISCONTINUED | OUTPATIENT
Start: 2023-01-04 | End: 2023-01-04 | Stop reason: HOSPADM

## 2023-01-03 RX ORDER — SODIUM CHLORIDE, SODIUM LACTATE, POTASSIUM CHLORIDE, CALCIUM CHLORIDE 600; 310; 30; 20 MG/100ML; MG/100ML; MG/100ML; MG/100ML
INJECTION, SOLUTION INTRAVENOUS CONTINUOUS PRN
Status: DISCONTINUED | OUTPATIENT
Start: 2023-01-03 | End: 2023-01-03

## 2023-01-03 RX ADMIN — SODIUM CHLORIDE, SODIUM LACTATE, POTASSIUM CHLORIDE, CALCIUM CHLORIDE AND DEXTROSE MONOHYDRATE: 5; 600; 310; 30; 20 INJECTION, SOLUTION INTRAVENOUS at 13:09

## 2023-01-03 RX ADMIN — Medication 2 G: at 11:53

## 2023-01-03 RX ADMIN — ONDANSETRON 4 MG: 2 INJECTION INTRAMUSCULAR; INTRAVENOUS at 14:02

## 2023-01-03 RX ADMIN — FENTANYL CITRATE 50 MCG: 50 INJECTION, SOLUTION INTRAMUSCULAR; INTRAVENOUS at 13:58

## 2023-01-03 RX ADMIN — OXYCODONE HYDROCHLORIDE 5 MG: 5 TABLET ORAL at 23:43

## 2023-01-03 RX ADMIN — LIDOCAINE HYDROCHLORIDE 60 MG: 20 INJECTION, SOLUTION INFILTRATION; PERINEURAL at 12:05

## 2023-01-03 RX ADMIN — SULFAMETHOXAZOLE AND TRIMETHOPRIM 1 TABLET: 800; 160 TABLET ORAL at 20:11

## 2023-01-03 RX ADMIN — PHENYLEPHRINE HYDROCHLORIDE 0.2 MCG/KG/MIN: 10 INJECTION INTRAVENOUS at 13:12

## 2023-01-03 RX ADMIN — PHENYLEPHRINE HYDROCHLORIDE 100 MCG: 10 INJECTION INTRAVENOUS at 12:33

## 2023-01-03 RX ADMIN — HYDROMORPHONE HYDROCHLORIDE 0.2 MG: 1 INJECTION, SOLUTION INTRAMUSCULAR; INTRAVENOUS; SUBCUTANEOUS at 15:22

## 2023-01-03 RX ADMIN — MIDAZOLAM 2 MG: 1 INJECTION INTRAMUSCULAR; INTRAVENOUS at 11:29

## 2023-01-03 RX ADMIN — SODIUM CHLORIDE: 9 INJECTION, SOLUTION INTRAVENOUS at 16:40

## 2023-01-03 RX ADMIN — ACETAMINOPHEN 975 MG: 325 TABLET, FILM COATED ORAL at 10:55

## 2023-01-03 RX ADMIN — PROPOFOL 150 MG: 10 INJECTION, EMULSION INTRAVENOUS at 12:05

## 2023-01-03 RX ADMIN — SODIUM CHLORIDE, POTASSIUM CHLORIDE, SODIUM LACTATE AND CALCIUM CHLORIDE: 600; 310; 30; 20 INJECTION, SOLUTION INTRAVENOUS at 13:59

## 2023-01-03 RX ADMIN — PHENYLEPHRINE HYDROCHLORIDE 0.2 MCG/KG/MIN: 10 INJECTION INTRAVENOUS at 12:47

## 2023-01-03 RX ADMIN — HUMAN INSULIN 16 UNITS/HR: 100 INJECTION, SOLUTION SUBCUTANEOUS at 22:21

## 2023-01-03 RX ADMIN — PHENYLEPHRINE HYDROCHLORIDE 100 MCG: 10 INJECTION INTRAVENOUS at 12:44

## 2023-01-03 RX ADMIN — ESCITALOPRAM OXALATE 20 MG: 20 TABLET ORAL at 16:37

## 2023-01-03 RX ADMIN — HUMAN INSULIN 1.5 UNITS/HR: 100 INJECTION, SOLUTION SUBCUTANEOUS at 10:56

## 2023-01-03 RX ADMIN — PROPOFOL 50 MG: 10 INJECTION, EMULSION INTRAVENOUS at 12:07

## 2023-01-03 RX ADMIN — HYDROMORPHONE HYDROCHLORIDE 0.4 MG: 1 INJECTION, SOLUTION INTRAMUSCULAR; INTRAVENOUS; SUBCUTANEOUS at 14:32

## 2023-01-03 RX ADMIN — ACETAMINOPHEN 975 MG: 325 TABLET, FILM COATED ORAL at 18:19

## 2023-01-03 RX ADMIN — HYDROMORPHONE HYDROCHLORIDE 0.4 MG: 1 INJECTION, SOLUTION INTRAMUSCULAR; INTRAVENOUS; SUBCUTANEOUS at 14:53

## 2023-01-03 RX ADMIN — HUMAN INSULIN 10 UNITS/HR: 100 INJECTION, SOLUTION SUBCUTANEOUS at 20:11

## 2023-01-03 RX ADMIN — SODIUM CHLORIDE, POTASSIUM CHLORIDE, SODIUM LACTATE AND CALCIUM CHLORIDE: 600; 310; 30; 20 INJECTION, SOLUTION INTRAVENOUS at 11:29

## 2023-01-03 RX ADMIN — FENTANYL CITRATE 75 MCG: 50 INJECTION, SOLUTION INTRAMUSCULAR; INTRAVENOUS at 12:19

## 2023-01-03 RX ADMIN — FENTANYL CITRATE 50 MCG: 50 INJECTION, SOLUTION INTRAMUSCULAR; INTRAVENOUS at 13:36

## 2023-01-03 RX ADMIN — FENTANYL CITRATE 25 MCG: 50 INJECTION, SOLUTION INTRAMUSCULAR; INTRAVENOUS at 12:00

## 2023-01-03 RX ADMIN — GENTAMICIN SULFATE 440 MG: 40 INJECTION, SOLUTION INTRAMUSCULAR; INTRAVENOUS at 11:29

## 2023-01-03 RX ADMIN — SODIUM CHLORIDE: 9 INJECTION, SOLUTION INTRAVENOUS at 11:29

## 2023-01-03 ASSESSMENT — ACTIVITIES OF DAILY LIVING (ADL)
ADLS_ACUITY_SCORE: 18

## 2023-01-03 ASSESSMENT — LIFESTYLE VARIABLES: TOBACCO_USE: 0

## 2023-01-03 ASSESSMENT — ENCOUNTER SYMPTOMS: ORTHOPNEA: 0

## 2023-01-03 ASSESSMENT — COPD QUESTIONNAIRES: COPD: 0

## 2023-01-03 NOTE — CONSULTS
Notified by JOSE of patient, Yaya Mg's planned admission today for surgery; insertion of inflatable penile prosthesis.  Consult  To perform ambulatory insulin infusion pump assessment.    Ambulatory Insulin Infusion Pump Assessment    Pump-Tandem T: Slim x 2; Runs in Control IQ-Off at present.  Cartridge-3 ml-Let pump run right up until now.  Cartridge empty; infusion set removed.  Has supplies with him.  Infusion Set-Auto Soft 90  CGMS-Dexcom G 6 just placed yesterday on left mid-abdomen; good for 10 days  Insulin:  Aspart (Novolog)  Endocrinologist-    Basal Rates:  5059-6083-1.6 units/hour    Insulin Sensitivity Factor:    1 unit for every 15 mg/dL above target    Insulin:CHO:  1 unit for every 8 grams of CHO eaten, meals and snacks    Target blood glucose:  110 mg/dL    Insulin Duration:  5 hours     mg/dL at  0959.  Intravenous insulin infusion initiated at 1.5 units/hour.  Ambulatory insulin infusion pump off and in secure bag to wife.    Recommend Endocrine consult/Inpatient Diabetes Service postoperatively for glycemic management and transition back to ambulatory pump from IV when appropriate.    Please call with any questions.    THOMAS Thompson  Diabetes Clinical Nurse Specialist/[facility]  [phone]

## 2023-01-03 NOTE — ANESTHESIA PREPROCEDURE EVALUATION
Anesthesia Pre-Procedure Evaluation    Patient: Yaya Mg   MRN: [id number] : 1975        Procedure : Procedure(s):  INSERTION of INFLATABLE PENILE PROSTHESIS          Past Medical History:   Diagnosis Date     Anxiety      Depressive disorder      Hypertension      Type 1 diabetes (H)       Past Surgical History:   Procedure Laterality Date     APPENDECTOMY  1985     BACK SURGERY  2010    lumbar calcium deposits removed     CATARACT EXTRACTION Right       No Known Allergies   Social History     Tobacco Use     Smoking status: Never     Smokeless tobacco: Never   Substance Use Topics     Alcohol use: Yes     Comment: socially      Wt Readings from Last 1 Encounters:   23 109.9 kg (242 lb 4.6 oz)        Anesthesia Evaluation   Pt has had prior anesthetic. Type: General.    No history of anesthetic complications       ROS/MED HX  ENT/Pulmonary:    (-) tobacco use, asthma, COPD and recent URI   Neurologic:       Cardiovascular:     (+) hypertension-range: 100-120/60-70/ ---- (-) CHF, MCCONNELL, orthopnea/PND and syncope   METS/Exercise Tolerance: >4 METS Comment: Denies any cardiopulmonary symptoms going up 2 flights of stairs. 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 This is being evaluated by PCP, with normal stress test, normal TTE, PFTs with mild obstruction and no symptom improvement with inhalers. Per patient, they are considering if this is vocal cord dysfunction.    Hematologic:       Musculoskeletal:       GI/Hepatic:    (-) GERD   Renal/Genitourinary:       Endo:     (+) type I DM, Last HgA1c: 6.4, Using insulin, - using insulin pump. Normal glucose range: 130s,     Psychiatric/Substance Use:       Infectious Disease:       Malignancy:       Other:            Physical Exam    Airway        Mallampati: II   TM distance: > 3 FB   Neck ROM: full   Mouth opening: > 3  cm    Respiratory Devices and Support         Dental         B=Bridge, C=Chipped, L=Loose, M=Missing    Cardiovascular          Rhythm and rate: regular and normal     Pulmonary           breath sounds clear to auscultation           OUTSIDE LABS:  CBC:   Lab Results   Component Value Date    WBC 4.6 12/29/2022    WBC 4.9 10/04/2022    HGB 14.9 12/29/2022    HGB 14.9 10/04/2022    HCT 40.3 12/29/2022    HCT 41.5 10/04/2022     12/29/2022     10/04/2022     BMP:   Lab Results   Component Value Date     (L) 12/29/2022     11/10/2022    POTASSIUM 4.0 12/29/2022    POTASSIUM 4.5 11/10/2022    CHLORIDE 97 (L) 12/29/2022    CHLORIDE 102 11/10/2022    CO2 25 12/29/2022    CO2 27 11/10/2022    BUN 18.0 12/29/2022    BUN 13.3 11/10/2022    CR 0.90 12/29/2022    CR 0.95 12/08/2022     (H) 01/03/2023     (H) 12/29/2022     COAGS: No results found for: PTT, INR, FIBR  POC: No results found for: BGM, HCG, HCGS  HEPATIC:   Lab Results   Component Value Date    ALBUMIN 4.4 11/10/2022    PROTTOTAL 6.7 11/10/2022    ALT 42 11/10/2022    AST 28 11/10/2022    ALKPHOS 114 11/10/2022    BILITOTAL 1.2 11/10/2022     OTHER:   Lab Results   Component Value Date    A1C 6.4 (H) 12/29/2022    SONIA 9.4 12/29/2022    TSH 0.96 12/15/2021    CRP <2.9 05/24/2022       Anesthesia Plan    ASA Status:  3   NPO Status:  NPO Appropriate    Anesthesia Type: General.     - Airway: LMA   Induction: Intravenous.           Consents    Anesthesia Plan(s) and associated risks, benefits, and realistic alternatives discussed. Questions answered and patient/representative(s) expressed understanding.    - Discussed:     - Discussed with:  Patient         Postoperative Care    Pain management: IV analgesics, Oral pain medications.   PONV prophylaxis: Ondansetron (or other 5HT-3)     Comments:    Other Comments:  in preop. Patient's insulin pump runs a basal rate of 1.6U/hr. Patient will administer his usual correction  dose via pump. We will then plan to disconnect it and run an IV basal infusion during the case. Endocrine service was curbsided - agree with starting with Algorithm 1, at 1.5U/hr. We will continue to monitor BG levels.  Discussed risks of general anesthesia, including aspiration pneumonia, sore throat/hoarse voice, abrasions/damage to lips/tongue/teeth, nausea, rare complications (including medication reactions, cardiac, pulmonary, hypoxia/low oxygen, recall). Ensured understanding, invited questions and all questions were answered. Patient wishes to proceed.       H&P reviewed: Unable to attach H&P to encounter due to EHR limitations. H&P Update: appropriate H&P reviewed, patient examined. No interval changes since H&P (within 30 days).         Mayra Bowen MD

## 2023-01-03 NOTE — CONSULTS
Diabetes Consult request received- will see patient tomorrow    Pt with Type I diabetes and is managed with ambulatory insulin pump. See note from Shirley KenLuci 1/3/23 with PTA pump settings. Primary team endorsing diet to be resumed tonight, and possible discharge tomorrow.       Plan:   -continue IV insulin drip postoperatively. If remaining stable tomorrow, we will transition back to ambulatory insulin pump  -added Novolog 1:8g CHO with meals, and snacks; please give this insulin while on IV insulin, to ensure no significant spikes in BG on the infusion.       Keyana Moser PA-C  Diabetes Management Service  Pager [phone]

## 2023-01-03 NOTE — ANESTHESIA CARE TRANSFER NOTE
Patient: Yaya Mg    Procedure: Procedure(s):  INSERTION of INFLATABLE PENILE PROSTHESIS       Diagnosis: Type 1 diabetes mellitus with other specified complication (H) [E10.69]  Diagnosis Additional Information: No value filed.    Anesthesia Type:   General     Note:    Oropharynx: oropharynx clear of all foreign objects and spontaneously breathing  Level of Consciousness: drowsy  Oxygen Supplementation: face mask  Level of Supplemental Oxygen (L/min / FiO2): 6L/min  Independent Airway: airway patency satisfactory and stable  Dentition: dentition unchanged  Vital Signs Stable: post-procedure vital signs reviewed and stable  Report to RN Given: handoff report given  Patient transferred to: PACU    Handoff Report: Identifed the Patient, Identified the Reponsible Provider, Reviewed the pertinent medical history, Discussed the surgical course, Reviewed Intra-OP anesthesia mangement and issues during anesthesia, Set expectations for post-procedure period and Allowed opportunity for questions and acknowledgement of understanding      Vitals:  Vitals Value Taken Time   /94 01/03/23 1413   Temp 36.7    Pulse 76 01/03/23 1422   Resp 8 01/03/23 1422   SpO2 100 % 01/03/23 1422   Vitals shown include unvalidated device data.    Electronically Signed By: THOMAS Woodruff CRNA  January 3, 2023  2:23 PM

## 2023-01-03 NOTE — OR NURSING
PACU to Inpatient Nursing Handoff    Patient Yaya Mg is a 47 year old male who speaks English.   Procedure Procedure(s):  INSERTION of INFLATABLE PENILE PROSTHESIS   Surgeon(s) Primary: Amandeep Hill MD  Resident - Assisting: João Dawson MD     No Known Allergies    Isolation  [unfilled]     Past Medical History   has a past medical history of Anxiety, Depressive disorder, Hypertension, and Type 1 diabetes (H).    Anesthesia General   Dermatome Level     Preop Meds acetaminophen (Tylenol) - time given: 1045   Nerve block Not applicable   Intraop Meds fentanyl (Sublimaze): 200 mcg total  ondansetron (Zofran): last given at 1402  insulin gtt   Local Meds Yes   Antibiotics cefazolin (Ancef) - last given at 1153  gentamicin (Garamycin) - last given at 1130     Pain Patient Currently in Pain: other (see comments) (BING, pt sleeping)   PACU meds  hydromorphone (Dilaudid): 1 mg (total dose) last given at 1522    PCA / epidural No   Capnography     Telemetry ECG Rhythm: Normal sinus rhythm   Inpatient Telemetry Monitor Ordered? No        Labs Glucose Lab Results   Component Value Date     01/03/2023     05/24/2022       Hgb Lab Results   Component Value Date    HGB 14.9 12/29/2022       INR No results found for: INR   PACU Imaging Not applicable     Wound/Incision Incision/Surgical Site 01/03/23 Penis (Active)   Incision Assessment UTV 01/03/23 1413   Nidia-Incision Assessment UTV 01/03/23 1413   Closure BING 01/03/23 1413   Incision Drainage Amount UTV 01/03/23 1413   Incision Care Medication applied - see MAR 01/03/23 1350   Dressing Intervention Clean, dry, intact 01/03/23 1413   Number of days: 0      CMS        Equipment Not applicable   Other LDA       IV Access Peripheral IV 01/03/23 Left Hand (Active)   Site Assessment WDL 01/03/23 1413   Line Status Saline locked 01/03/23 1413   Phlebitis Scale 0-->no symptoms 01/03/23 1413   Infiltration Scale 0 01/03/23 1015   Number of  days: 0       Peripheral IV 01/03/23 Right Hand (Active)   Site Assessment WDL 01/03/23 1413   Line Status Infusing 01/03/23 1413   Phlebitis Scale 0-->no symptoms 01/03/23 1413   Infiltration Scale 0 01/03/23 1115   Number of days: 0      Blood Products Not applicable EBL 40 mL   Intake/Output Date 01/03/23 0700 - 01/04/23 0659   Shift 7620-7076 1179-8105 2496-5549 24 Hour Total   INTAKE   I.V. 1000   1000   IV Piggyback 100   100   Shift Total(mL/kg) 1100(10.01)   1100(10.01)   OUTPUT   Blood 40   40   Shift Total(mL/kg) 40(0.36)   40(0.36)   Weight (kg) 109.9 109.9 109.9 109.9      Drains / Leblanc Closed/Suction Drain 1 Right Other (Comment) Bulb 10 Angolan (Active)   Site Description [facility] 01/03/23 1413   Dressing Status Normal: Clean, Dry & Intact 01/03/23 1413   Drainage Appearance Serosanguenous 01/03/23 1413   Status To bulb suction 01/03/23 1413   Number of days: 0       Urethral Catheter 01/03/23 Latex 16 fr (Active)   Tube Description [facility] 01/03/23 1413   Collection Container Standard;Patent 01/03/23 1413   Securement Method Leg strap 01/03/23 1413   Number of days: 0      Time of void PreOp Void Prior to Procedure: 0830 (01/03/23 1036)    PostOp      Diapered? No   Bladder Scan     PO    tolerating sips     Vitals    B/P: (!) 129/90  T: 98  F (36.7  C)    Temp src: Oral  P:  Pulse: 74 (01/03/23 1515)          R: (!) 9  O2:  SpO2: 100 %         Oxygen Delivery: 4 LPM (01/03/23 1415)         Family/support present significant other   Patient belongings     Patient transported on cart and air mat   DC meds/scripts (obs/outpt) Not applicable   Inpatient Pain Meds Released? Yes       Special needs/considerations insulin gtt   Tasks needing completion None       Allison Weiner, RN  ASCOM [id number]

## 2023-01-03 NOTE — OP NOTE
OPERATIVE REPORT    PREOPERATIVE DIAGNOSIS: Erectile dysfunction    POSTOPERATIVE DIAGNOSIS: Same    PROCEDURES PERFORMED:   1. Implant inflatable penile prosthesis - Coloplast Titan 20cm zero degree with bilateral 2cm rear tip extenders    STAFF SURGEON: Amandeep Hill MD, present for entire case.     RESIDENT(S): João Dawson MD    ANESTHESIA: General    ESTIMATED BLOOD LOSS: 25 mL.     IV FLUIDS: See anesthesia record    COMPLICATIONS: None.     SIGNIFICANT FINDINGS: Proper fitting of implants equally with distal tips in mid glans    BRIEF OPERATIVE INDICATIONS: Yaya Mg is a 47 year old male with history of erectile dysfunction refractory to other medications. The patient was counseled on the alternatives, risks, and benefits and elected to proceed with the above stated procedure.      DESCRIPTION OF PROCEDURE: After full informed voluntary consent was obtained, the patient was transported to the operating room, placed supine on the table. A brief was held. Pneumo boots were applied, and a general anesthetic was induced without complication then they were prepped and draped in the usual sterile fashion. A timeout was taken to confirm correct patient, procedure and laterality. The genitals were shaved, prepped and draped in the supine position using an alcohol based prep. A [place] (SKW) retractor was placed at the start of the case followed by a 16Fr breaux catheter without difficulty. 10cc of sterile water was placed in the balloon. A sharp retractor was placed in the distal dorsal urethra to retract the penis cephalad.     Next, we proceeded by making a penoscrotal incision transversely with an 15 blade scalpel, approximately 4 cm in length. We then bluntly dissected down to identify the corporal bodies. Two 2-0 PDS sutures were then placed vertically in each of the corporal bodies to aid in marking and holding the corpora, and a scalpel was used to incise the corpora. Metzenbaum scissors  were then used to first gently dilate proximally down to the base of the corporal bodies and then distally to the mid glans bilaterally. We then used the Huerta dilators starting at size 8 and increasing up to size 12 to dilate the tract proximally and distally to the glans which was made somewhat difficult due to some mild fibrosis of the corpora.  Next, the Ady device was used to identify and measure the corporal lengths; measurements were 12cm proximal and 10cm distal right while 10cm distal and 12cm proximal left.  The Coloplast Titan implant was then prepared on the backtable and soaked in irricept solution. Next, irricept irrigation was used to irrigate each corporal cavity copiously. A Coloplast Titan inflatable implant, 20cm zero degree with a 2cm rear tip, was placed in each corpora. The tubing was positioned so as to avoid entanglement.  It inflated easily with proper filling and bilateral implants in the mid glans; no evidence of crossover, with symmetric and cosmetically ideal appearance to the erection. The stay sutures of 2-0 PDS were tied over the corporotomies over the implants to complete the closure. Additional aseptic solution was used to washout the scrotal cavity.     We then turned our attention to fashioning a scrotal pouch. A finger was used to develop a space anterior to the testicles and the pump was gently positioned into the scrotal pouch, taking care to position the pump with the button facing out and the tubing not entangled.     Next, we digitally probed the right external ring and then exposed a right retropubic space to place the implant reservoir by elevating the ring with a baby Kirkwood retractor and blunt dissection into the left space of Retzius. A 75cc reservoir was used with 75cc of saline inside. Care was taken to ensure the lock-out valve was positioned anteriorly. The tubing was positioned so as to avoid entanglement and then connected to the pump.     For the peno-scrotal  incision, two layers of dartos muscle were approximated using running 2-0 Vicryls, irrigating the incision copiously after closure of each skin layer. A 10 [place] citizen round PENNY drain was brought out the dependent portion of the left hemiscrotum, and the tip of this was positioned deep within the scrotum. This was placed to bulb suction and secured to the skin using a nylon drain stitch. The skin was closed using a running horizontal mattress 4-0 monocryl suture. The prosthesis was left semi-inflated at the conclusion of the case. Bacitracin ointment and a kerlex mummy wrapping were placed around the penis and scrotum.     Postop plan:  - Admit to outpatient observation overnight  - Endocrine consult for T1DM management  - Deflate implant tomorrow AM with pain medication beforehand  - Drain to be removed prior to discharge if output <30 ml per shift  - Discharge home tomorrow on bactrim x 7 days      I was present and scrubbed for the entire procedure.  Standard penoscrotal IPP placement.  Amandeep Hill MD  Urology Staff

## 2023-01-03 NOTE — ANESTHESIA PROCEDURE NOTES
Airway       Patient location during procedure: OR  Staff -        Anesthesiologist:  Mayra Bowen MD       CRNA: Caro Okeefe APRN CRNA       Performed By: CRNA  Consent for Airway        Urgency: elective  Indications and Patient Condition       Indications for airway management: kristy-procedural       Induction type:intravenous       Mask difficulty assessment: 0 - not attempted    Final Airway Details       Final airway type: supraglottic airway    Supraglottic Airway Details        Type: LMA       Brand: Ambu AuraGain       LMA size: 5    Post intubation assessment        Placement verified by: capnometry, equal breath sounds and chest rise        Number of attempts at approach: 1       Secured with: pink tape       Ease of procedure: easy       Dentition: Intact and Unchanged

## 2023-01-03 NOTE — OR NURSING
Dr Bowen ordered RN to restart insulin gtt in pacu at 0.2units/hr. RN verified with MDA and read back verbal order. RN called Keyana EAGLE endocrinologist to confirm that she was placing floor orders and that she agreed wit the pt being on the insulin gtt. She stats that is a good plan and pt has additional orders when when he eats meals on the floor.

## 2023-01-03 NOTE — CONSULTS
NEW INPATIENT DIABETES MANAGEMENT CONSULT  Yaya Mg  Age: 47 year old  MRN # [id number]   YOB: 1975    Chief Complaint: Erectile dysfunction, placement of Implant inflatable penile prosthesis   Reason for Consult: Post operative Glycemia Contrl  Consulting Provider: João Dawson MD    History of Present Illness:   History obtain from review of Epic and speaking with patient and then wife.  Yaya Mg is a 47 year old male with erectile dysfunction, type 1 diabetes, hypertension,dyslipidemia, obesity, depression, Cough variant asthma who was admitted after placement of Implant inflatable penile prosthesis.  Patient lives in [place] with his wife.  He last saw his provider on 11/10/202 for a preventative health exam. He has worn a T slim pump for about 1 year.  He is currently wearing a dex com continuous glucose monitor. His pump has been removed. His A1C=6.4 and his hgb=14.9. He bg run around 128 and he says he has not had any recent lows. His dex com alarms at 75 and he gets dizzy, confused and sweaty. He has a manual glucose monitor at home if he gets low. No glucagon. If he gets low he drinks regular coke. He does not live alone. No DKA that he knows of. He has no specific diet he follows. Today he has had a little nausea he thinks because he has not eaten.  No emesis or diarrhea. He has not sweats and vitals show no fevers. Pain is controlled. [facility] [place] [facility] before going home to [place].      Other Active Medical Problems:    Erectile dysfunction   type 1 diabetes,  Hypertension  Dyslipidemia   obesity, BMI=35.78   depression   Cough variant asthma     Diabetes Mellitus Type: 1  Duration:    Since he was 25-26 years old.  He is now 47 years old.  Diabetic Complications: None.  Denies neuropathy, retinopathy( has eye exam scheduled) and nephropathy( last albumin urine mg/g Cr=5.51 on 12/15/21  Prior to Admission Diabetes  "Regimen:  Has had his pump for about 1 year    Pump-Tandem T: Slim x 2; Runs in Control IQ-Off at present.  CGMS-Dexcom G 6 just placed yesterday on left mid-abdomen; good for 10 days  Insulin:  Aspart (Novolog)    Basal Rates:  9791-1588-1.6 units/hour     Insulin Sensitivity Factor:    1 unit for every 15 mg/dL above target     Insulin:CHO:  1 unit for every 8 grams of CHO eaten, meals and snacks     Target blood glucose:  110 mg/dL  Insulin Duration:  5 hours    BG monitor: Dex Com G6  Frequency of checks: Multiple times daily with CGM  Diet: No specific diet  Usual BG control PTA:  Good control with A1c 6.4 on 12/29/2022  History of DKA: No  Able to Detect Hypoglycemia: yes get dizzy, confused  Usual Diabetes Care Provider: Dr Ashraf   Primary Care Provider: Selwyn Ashraf  Factors Impacting IP Glucose Control: Obesity  Current Diet:  Regular diet    10 point ROS completed with pertinent positives and negatives noted in the HPI- No chest pain or funny heart beats.  No cough or sob at rest. No bleeding.  Past medical, family and social histories are reviewed and updated.    Social History    Tobacco: Never    Alcohol: Does drink, he says socially    Marital Status:      Place of Residence: [place], [place]    Occupation: Drives production Truck    Family History  Father with Type 2 Diabetes       Physical Exam   BP (!) 133/95   Pulse 78   Temp 98.1  F (36.7  C) (Oral)   Resp 11   Ht 1.753 m (5' 9\")   Wt 109.9 kg (242 lb 4.6 oz)   SpO2 99%   BMI 35.78 kg/m    General: pleasant, in no distress.   HEENT: normocephalic, atraumatic. Oral mucous membranes moist.   Lungs: unlabored respiration, no cough, on room air  ABD: rounded, nondistended, has dex com monitor on left abdomen  Skin: warm and dry, no obvious lesions on visible skin  MSK:  moves all extremities  Lymp:  no LE edema   Mental status:  alert, oriented to self, place, time  Psych:  calm and appropriate interaction     Most Recent Laboratory " Tests:  Recent Labs   Lab 12/29/22  0914   HGB 14.9     Recent Labs   Lab 12/29/22  0914   A1C 6.4*     Recent Labs   Lab 12/29/22  0914   CR 0.90     Recent Labs   Lab 01/03/23  1545 01/03/23  1441 01/03/23  1404 01/03/23  1339 01/03/23  1305 01/03/23  1219   * 105* 102* 85 90 115*     ROUTINE IP LABS (Last four results)  BMPRecent Labs   Lab 01/04/23  0948 01/04/23  0914 01/04/23  0837 01/04/23  0832 01/03/23  0959 12/29/22  0914   NA  --   --   --  135  --  135*   POTASSIUM  --   --   --  4.3  --  4.0   CHLORIDE  --   --   --  104  --  97*   SONIA  --   --   --  8.6  --  9.4   CO2  --   --   --  25  --  25   BUN  --   --   --  13  --  18.0   CR  --   --   --  0.80  --  0.90   * 253* 263* 245*   < > 169*    < > = values in this interval not displayed.     CBC  Recent Labs   Lab 01/04/23  0832 12/29/22  0914   WBC 6.9 4.6   RBC 4.46 4.84   HGB 13.3 14.9   HCT 37.8* 40.3   MCV 85 83   MCH 29.8 30.8   MCHC 35.2 37.0*   RDW 13.2 13.1    202     INRNo lab results found in last 7 days.          Reviewed BG Trend:        Unfortunately insulin Novolog pen did not arrive to unit in time to give carb coverage. Prior to midnight insulin usage at one point was up to 16 and then would be shut off, very variable. Reviewing insulin usage after midnight when he not eating he was using about 2.6 units per hour. 50% of this would be about 31 units.  At home he uses about 38.4 of basal insulin in 24 hours.  Would continue same pump settings at home.     Assessment:   Yaya Mg is a 47 year old male with erectile dysfunction, type 1 diabetes, hypertension,dyslipidemia, obesity, depression, Cough variant asthma who was admitted after placement of Implant inflatable penile prosthesis    1. Type 1 diabetes with good control, A1C=6.4  2. Obesity  3. POD #1 placement of Implant inflatable penile prosthesis       Plan:   Continue IV insulin until pump, new infusion set and Novolog aspart insulin is here to  load resevoir   -Around 10 am, get BG, and then place pump infusion set, after pump running then 1/2 hour later shut off insulin pump   - Same settings for pump as previous:   -Pump-Tandem T: Slim x 2; Runs in Control IQ-Off at present.  Wife brought in supplies , insulin and pump  Infusion Set-Auto Soft 90  CGMS-Dexcom G 6 just placed 1/2/2022 on left but has extra just in case- mid-abdomen; good for 10 days  Insulin:  Aspart (Novolog)  Provider caring for Patient:  Dr Ashraf     Basal Rates:  4580-4986-1.6 units/hour     Insulin Sensitivity Factor:    1 unit for every 15 mg/dL above target     Insulin:CHO:  1 unit for every 8 grams of CHO eaten, meals and snacks     Target blood glucose:  110 mg/dL     Insulin Duration:  5 hours   - If for some reason pump does not work patient cannot go home without subcutaneous insulin-He is a type 1 diabetic- discussed this with RN today      -BG monitoring TID AC, HS, 0200- Per hospital police cannot make clinical decision based on dex com values, must use hospital meter   -hypoglycemia protocol: Would recommend keeping glucagon on hand: declined RX   -recommend diet per primary team with carb counting protocol   -diabetes education needs: none    -on discharge, will recommend outpatient follow up with Dr Ashraf    Discussed plan of care with patient, nursing, and primary team in person    See separate progress note and AVS for discharge instructions    Thank you for this consult; Inpatient Diabetes will continue to be available with issues.     To contact Endocrine Diabetes service:   From 8AM-4PM: page inpatient diabetes provider that is following the patient  For questions or updates from 4PM-8AM: page the diabetes job code for on call fellow: [phone]      Review of prior external note(s) from - Epic   80 minutes spent on the date of the encounter doing chart review, history and exam, documentation and further activities per the note      my time on the unit was spent  counseling the patient and reviewing discharge instructions and/or coordinating care regarding glycemic management.  See note for details.           Renetta Mitchell PA-C  Inpatient Diabetes Service  Pager   [phone]  1/4/2022

## 2023-01-04 VITALS
SYSTOLIC BLOOD PRESSURE: 142 MMHG | RESPIRATION RATE: 18 BRPM | BODY MASS INDEX: 35.89 KG/M2 | TEMPERATURE: 98.7 F | DIASTOLIC BLOOD PRESSURE: 76 MMHG | OXYGEN SATURATION: 97 % | HEART RATE: 92 BPM | HEIGHT: 69 IN | WEIGHT: 242.29 LBS

## 2023-01-04 LAB
ANION GAP SERPL CALCULATED.3IONS-SCNC: 6 MMOL/L (ref 3–14)
BUN SERPL-MCNC: 13 MG/DL (ref 7–30)
CALCIUM SERPL-MCNC: 8.6 MG/DL (ref 8.5–10.1)
CHLORIDE BLD-SCNC: 104 MMOL/L (ref 94–109)
CO2 SERPL-SCNC: 25 MMOL/L (ref 20–32)
CREAT SERPL-MCNC: 0.8 MG/DL (ref 0.66–1.25)
ERYTHROCYTE [DISTWIDTH] IN BLOOD BY AUTOMATED COUNT: 13.2 % (ref 10–15)
GFR SERPL CREATININE-BSD FRML MDRD: >90 ML/MIN/1.73M2
GLUCOSE BLD-MCNC: 245 MG/DL (ref 70–99)
GLUCOSE BLDC GLUCOMTR-MCNC: 109 MG/DL (ref 70–99)
GLUCOSE BLDC GLUCOMTR-MCNC: 112 MG/DL (ref 70–99)
GLUCOSE BLDC GLUCOMTR-MCNC: 131 MG/DL (ref 70–99)
GLUCOSE BLDC GLUCOMTR-MCNC: 142 MG/DL (ref 70–99)
GLUCOSE BLDC GLUCOMTR-MCNC: 149 MG/DL (ref 70–99)
GLUCOSE BLDC GLUCOMTR-MCNC: 169 MG/DL (ref 70–99)
GLUCOSE BLDC GLUCOMTR-MCNC: 191 MG/DL (ref 70–99)
GLUCOSE BLDC GLUCOMTR-MCNC: 201 MG/DL (ref 70–99)
GLUCOSE BLDC GLUCOMTR-MCNC: 253 MG/DL (ref 70–99)
GLUCOSE BLDC GLUCOMTR-MCNC: 263 MG/DL (ref 70–99)
GLUCOSE BLDC GLUCOMTR-MCNC: 82 MG/DL (ref 70–99)
HCT VFR BLD AUTO: 37.8 % (ref 40–53)
HGB BLD-MCNC: 13.3 G/DL (ref 13.3–17.7)
MCH RBC QN AUTO: 29.8 PG (ref 26.5–33)
MCHC RBC AUTO-ENTMCNC: 35.2 G/DL (ref 31.5–36.5)
MCV RBC AUTO: 85 FL (ref 78–100)
PLATELET # BLD AUTO: 164 10E3/UL (ref 150–450)
POTASSIUM BLD-SCNC: 4.3 MMOL/L (ref 3.4–5.3)
RBC # BLD AUTO: 4.46 10E6/UL (ref 4.4–5.9)
SODIUM SERPL-SCNC: 135 MMOL/L (ref 133–144)
WBC # BLD AUTO: 6.9 10E3/UL (ref 4–11)

## 2023-01-04 PROCEDURE — 36415 COLL VENOUS BLD VENIPUNCTURE: CPT | Performed by: STUDENT IN AN ORGANIZED HEALTH CARE EDUCATION/TRAINING PROGRAM

## 2023-01-04 PROCEDURE — 82962 GLUCOSE BLOOD TEST: CPT

## 2023-01-04 PROCEDURE — 250N000009 HC RX 250: Performed by: PHYSICIAN ASSISTANT

## 2023-01-04 PROCEDURE — 250N000009 HC RX 250: Performed by: STUDENT IN AN ORGANIZED HEALTH CARE EDUCATION/TRAINING PROGRAM

## 2023-01-04 PROCEDURE — 96372 THER/PROPH/DIAG INJ SC/IM: CPT | Performed by: PHYSICIAN ASSISTANT

## 2023-01-04 PROCEDURE — 80048 BASIC METABOLIC PNL TOTAL CA: CPT | Performed by: STUDENT IN AN ORGANIZED HEALTH CARE EDUCATION/TRAINING PROGRAM

## 2023-01-04 PROCEDURE — 250N000012 HC RX MED GY IP 250 OP 636 PS 637: Performed by: PHYSICIAN ASSISTANT

## 2023-01-04 PROCEDURE — 99205 OFFICE O/P NEW HI 60 MIN: CPT | Performed by: PHYSICIAN ASSISTANT

## 2023-01-04 PROCEDURE — 250N000013 HC RX MED GY IP 250 OP 250 PS 637: Performed by: STUDENT IN AN ORGANIZED HEALTH CARE EDUCATION/TRAINING PROGRAM

## 2023-01-04 PROCEDURE — 85014 HEMATOCRIT: CPT | Performed by: STUDENT IN AN ORGANIZED HEALTH CARE EDUCATION/TRAINING PROGRAM

## 2023-01-04 RX ORDER — NICOTINE POLACRILEX 4 MG
15-30 LOZENGE BUCCAL
Status: DISCONTINUED | OUTPATIENT
Start: 2023-01-04 | End: 2023-01-04 | Stop reason: HOSPADM

## 2023-01-04 RX ORDER — DEXTROSE MONOHYDRATE 25 G/50ML
25-50 INJECTION, SOLUTION INTRAVENOUS
Status: DISCONTINUED | OUTPATIENT
Start: 2023-01-04 | End: 2023-01-04 | Stop reason: HOSPADM

## 2023-01-04 RX ADMIN — SULFAMETHOXAZOLE AND TRIMETHOPRIM 1 TABLET: 800; 160 TABLET ORAL at 08:03

## 2023-01-04 RX ADMIN — ACETAMINOPHEN 975 MG: 325 TABLET, FILM COATED ORAL at 08:03

## 2023-01-04 RX ADMIN — OXYCODONE HYDROCHLORIDE 10 MG: 10 TABLET ORAL at 10:37

## 2023-01-04 RX ADMIN — INSULIN ASPART 5 UNITS: 100 INJECTION, SOLUTION INTRAVENOUS; SUBCUTANEOUS at 08:05

## 2023-01-04 RX ADMIN — HUMAN INSULIN 2 UNITS/HR: 100 INJECTION, SOLUTION SUBCUTANEOUS at 07:20

## 2023-01-04 RX ADMIN — ATORVASTATIN CALCIUM 20 MG: 20 TABLET, FILM COATED ORAL at 08:03

## 2023-01-04 RX ADMIN — HUMAN INSULIN 8 UNITS/HR: 100 INJECTION, SOLUTION SUBCUTANEOUS at 00:53

## 2023-01-04 RX ADMIN — LISINOPRIL 5 MG: 5 TABLET ORAL at 08:03

## 2023-01-04 RX ADMIN — OXYCODONE HYDROCHLORIDE 5 MG: 5 TABLET ORAL at 06:18

## 2023-01-04 RX ADMIN — ESCITALOPRAM OXALATE 20 MG: 20 TABLET ORAL at 08:03

## 2023-01-04 ASSESSMENT — ACTIVITIES OF DAILY LIVING (ADL)
ADLS_ACUITY_SCORE: 18

## 2023-01-04 NOTE — DISCHARGE INSTRUCTIONS
IP Diabetes Management Team Discharge Instructions    Glucose Control Regimen: No changes to home regimen: Continue the following:  Pump-Tandem T: Slim x 2; Runs in Control IQ-Off at present.  Cartridge-3 ml-Let pump run right up until now.  Cartridge empty; infusion set removed.  Has supplies with him.  Infusion Set-Auto Soft 90  CGMS-Dexcom G 6 just placed 1/2/2022 on left mid-abdomen; good for 10 days  Insulin:  Aspart (Novolog)  Provider caring for Patient:  Dr Ashraf     Basal Rates:  8932-2417-1.6 units/hour     Insulin Sensitivity Factor:    1 unit for every 15 mg/dL above target     Insulin:CHO:  1 unit for every 8 grams of CHO eaten, meals and snacks     Target blood glucose:  110 mg/dL     Insulin Duration:  5 hours    Blood Glucose Checks: Has dex Com continuous Monitor: Checks Multiple times daily Checks at least three times daily before meals, and at bedtime.    Endocrinology Outpatient follow up:  For issues with Blood glucoses: Please call Current Provider Dr Ashraf if unable to reach that provider : Please call the clinic at [phone] if you have non-urgent questions regarding your blood sugars or insulin.     If you have urgent questions or concerns regarding your blood sugars or insulin, you may contact [phone] (the main hospital ). Ask to speak with the endocrinologist on call.    Your target A1c value is less than 7%. Your most recent A1c is 6.4     Thank you for letting the Diabetes Management Team be involved in your care!

## 2023-01-04 NOTE — PROGRESS NOTES
A&Ox4, calm and cooperative, able to make needs known with call light in reach.  VSS on RA, continent of b&b, LBM 1/2 per pt report.  Denied SOB, CP, n/t, n/v.  LS clear and equal bilaterally, BS normoactive.  Pt reported pain 4-5/10, given PRN 5mg oxycodone.  Insulin drip with BG checks Q1H.  Algorithm 4.  BG dipped to 68, given apple juice.  PENNY drain dressing CDI, moderate drainage.  Leblanc pulled by urology this morning, pt given urinal and instructed to inform nurse of any voiding.  Pt is potentially discharging today per urology.  Continue POC.

## 2023-01-04 NOTE — PROGRESS NOTES
"Urology  Progress Note    NAEO  Pain controlled on PRN's, premedicated this AM  Tolerating diet  Ambulating  Leblanc in place     Exam  /84 (BP Location: Left arm)   Pulse 75   Temp 98.5  F (36.9  C) (Oral)   Resp 16   Ht 1.753 m (5' 9\")   Wt 109.9 kg (242 lb 4.6 oz)   SpO2 96%   BMI 35.78 kg/m    No acute distress  Unlabored breathing  Abdomen soft, nontender, nondistended.   Scrotal incision c/d/i  PENNY serosanguinous  Leblanc with clear yellow urine in tubing    /1625  PENNY 50/--    Labs  Recent Labs   Lab Test 12/29/22  0914 12/08/22  1003 11/10/22  1405 10/04/22  0751 05/24/22  1507   WBC 4.6  --   --  4.9 4.3   HGB 14.9  --   --  14.9 14.9   CR 0.90 0.95 1.42*  --  0.74      AM labs pending    Assessment/Plan  47 year old y/o male POD#1 s/p IPP for erectile dysfunction.     Neuro: Tylenol, oxy/dilaudid for pain control  CV: PTA meds   Pulm: incentive spirometry while awake  FEN/GI: regular diet, MIVF discontinued  Endo: sliding scale insulin while in-house, PTA meds on discharge  : Leblanc catheter removed, pending TOV  Heme/ID: AM labs pending, periop abx complete  Activity: up ad anne  PPx: SCDs  Dispo: plan for discharge later this AM pending TOV    Seen and examined with the chief resident. Will discuss with Dr. Hill.    Yasmine Franks MD, PGY-2  Urology Resident     Contacting the Urology Team     Please use the following job codes to reach the Urology Team. Note that you must use an in house phone and that job codes cannot receive text pages.     On weekdays, dial [phone] (or star-star-star [phone] on the new [employer] telephones) then [phone] to reach the Adult Urology resident or PA on call    On weekdays, dial [phone] (or star-star-star [phone] on the new [employer] telephones) then [phone] to reach the Pediatric Urology resident    On weeknights and weekends, dial [phone] (or star-star-star [phone] on the new [employer] telephones) then [phone] to reach the Urology resident on call (for both Adult and Pediatrics)        "

## 2023-01-04 NOTE — PROGRESS NOTES
Pt arrived to the floor from PACU around 1615.    Gauze dressing on the surgical site is CDI.     Leblanc in place with adequate output.     PENNY in place (R groin) with adequate output.      Insulin drip running with blood sugar checks hourly.      Pt ate dinner, but pharmacy did not send insulin pen to floor for hours after patient arrival.  Carb coverage was not able to be administered due to the medication being late.      Pain has been rated around 3-4/10-- controlled with scheduled medications.

## 2023-01-04 NOTE — PROGRESS NOTES
IP Diabetes Management Team Discharge Instructions    Put these orders in the Patient education as part of AVS to be given to patient.  Also discussed with patient    Glucose Control Regimen: No changes to home regimen: Continue the following:  Pump-Tandem T: Slim x 2; Runs in Control IQ-Off at present.  Cartridge-3 ml-Let pump run right up until now.  Cartridge empty; infusion set removed.  Has supplies with him.  Infusion Set-Auto Soft 90  CGMS-Dexcom G 6 just placed 1/2/2022 on left mid-abdomen; good for 10 days  Insulin:  Aspart (Novolog)  Provider caring for Patient:  Dr Ashraf     Basal Rates:  6258-7085-1.6 units/hour     Insulin Sensitivity Factor:    1 unit for every 15 mg/dL above target     Insulin:CHO:  1 unit for every 8 grams of CHO eaten, meals and snacks     Target blood glucose:  110 mg/dL     Insulin Duration:  5 hours    Blood Glucose Checks: Has dex Com continuous Monitor: Checks Multiple times daily Checks at least three times daily before meals, and at bedtime.    Endocrinology Outpatient follow up:  For issues with Blood glucoses: Please call Current Provider Dr Ashraf if unable to reach that provider : Please call the clinic at [phone] if you have non-urgent questions regarding your blood sugars or insulin.     If you have urgent questions or concerns regarding your blood sugars or insulin, you may contact [phone] (the main hospital ). Ask to speak with the endocrinologist on call.    Your target A1c value is less than 7%. Your most recent A1c is 6.4     Thank you for letting the Diabetes Management Team be involved in your care!

## 2023-01-08 DIAGNOSIS — E10.9 TYPE 1 DIABETES MELLITUS WITHOUT COMPLICATION (H): ICD-10-CM

## 2023-01-10 NOTE — TELEPHONE ENCOUNTER
Novolog is not on formulary.  Humalog is preferred.  This is pended.  Please review and sign if appropriate.

## 2023-01-11 ENCOUNTER — MYC MEDICAL ADVICE (OUTPATIENT)
Dept: FAMILY MEDICINE | Facility: OTHER | Age: 48
End: 2023-01-11

## 2023-01-11 DIAGNOSIS — E10.9 TYPE 1 DIABETES MELLITUS WITHOUT COMPLICATION (H): ICD-10-CM

## 2023-01-11 RX ORDER — INSULIN LISPRO 100 [IU]/ML
INJECTION, SOLUTION INTRAVENOUS; SUBCUTANEOUS
Qty: 50 ML | Refills: 3 | Status: SHIPPED | OUTPATIENT
Start: 2023-01-11 | End: 2023-06-05

## 2023-01-11 RX ORDER — PROCHLORPERAZINE 25 MG/1
1 SUPPOSITORY RECTAL
Qty: 9 EACH | Refills: 4 | Status: SHIPPED | OUTPATIENT
Start: 2023-01-11 | End: 2024-02-06

## 2023-01-17 DIAGNOSIS — E78.2 MIXED HYPERLIPIDEMIA: ICD-10-CM

## 2023-01-17 RX ORDER — ATORVASTATIN CALCIUM 20 MG/1
20 TABLET, FILM COATED ORAL DAILY
Qty: 90 TABLET | Refills: 3 | Status: SHIPPED | OUTPATIENT
Start: 2023-01-17 | End: 2024-01-02

## 2023-01-19 ENCOUNTER — MYC MEDICAL ADVICE (OUTPATIENT)
Dept: FAMILY MEDICINE | Facility: OTHER | Age: 48
End: 2023-01-19

## 2023-01-19 ENCOUNTER — OFFICE VISIT (OUTPATIENT)
Dept: UROLOGY | Facility: CLINIC | Age: 48
End: 2023-01-19
Payer: COMMERCIAL

## 2023-01-19 VITALS
DIASTOLIC BLOOD PRESSURE: 86 MMHG | BODY MASS INDEX: 34.07 KG/M2 | WEIGHT: 230 LBS | OXYGEN SATURATION: 97 % | SYSTOLIC BLOOD PRESSURE: 129 MMHG | HEIGHT: 69 IN | HEART RATE: 95 BPM

## 2023-01-19 DIAGNOSIS — E10.69 TYPE 1 DIABETES MELLITUS WITH OTHER SPECIFIED COMPLICATION (H): ICD-10-CM

## 2023-01-19 DIAGNOSIS — R19.5 POSITIVE COLORECTAL CANCER SCREENING USING COLOGUARD TEST: ICD-10-CM

## 2023-01-19 DIAGNOSIS — Z12.11 ENCOUNTER FOR SCREENING COLONOSCOPY: Primary | ICD-10-CM

## 2023-01-19 DIAGNOSIS — Z09 POSTOP CHECK: Primary | ICD-10-CM

## 2023-01-19 DIAGNOSIS — N52.1 ERECTILE DYSFUNCTION DUE TO DISEASES CLASSIFIED ELSEWHERE: ICD-10-CM

## 2023-01-19 PROCEDURE — 99024 POSTOP FOLLOW-UP VISIT: CPT | Performed by: UROLOGY

## 2023-01-19 ASSESSMENT — PAIN SCALES - GENERAL: PAINLEVEL: MILD PAIN (2)

## 2023-01-19 NOTE — NURSING NOTE
"Chief Complaint   Patient presents with     Follow Up     Post-op s/p IPP insertion 1/3/23       Blood pressure 129/86, pulse 95, height 1.753 m (5' 9\"), weight 104.3 kg (230 lb), SpO2 97 %. Body mass index is 33.97 kg/m .    Patient Active Problem List   Diagnosis     Erectile dysfunction due to diseases classified elsewhere     Type 1 diabetes mellitus without complication (H)     Depression with anxiety     Mixed hyperlipidemia     Elevated LFTs     Morbid obesity (H)     Umbilical hernia without obstruction and without gangrene     Burning sensation of feet     Cough variant asthma     FH: colon cancer in relative diagnosed at >50 years old       No Known Allergies    Current Outpatient Medications   Medication Sig Dispense Refill     albuterol (PROAIR HFA/PROVENTIL HFA/VENTOLIN HFA) 108 (90 Base) MCG/ACT inhaler Inhale 2 puffs into the lungs every 6 hours 18 g 4     atorvastatin (LIPITOR) 20 MG tablet Take 1 tablet (20 mg) by mouth daily 90 tablet 3     BD INSULIN SYRINGE ULTRAFINE 30G X 1/2\" 1 ML miscellaneous USE AS NEEDED 100 each 5     budesonide-formoterol (SYMBICORT) 80-4.5 MCG/ACT Inhaler Inhale 2 puffs into the lungs 2 times daily 10 g 3     Continuous Blood Gluc  (DEXCOM G6 ) JUSTEN        Continuous Blood Gluc Sensor (DEXCOM G6 SENSOR) MISC 1 each every 10 days Change every 10 days. 9 each 4     Continuous Blood Gluc Transmit (DEXCOM G6 TRANSMITTER) MISC 1 each every 3 months Change every 3 months. 1 each 4     escitalopram (LEXAPRO) 20 MG tablet Take 1 tablet (20 mg) by mouth daily 90 tablet 3     gabapentin (NEURONTIN) 300 MG capsule 1-2 capsules at night as needed 60 capsule 3     Insulin Infusion Pump (T: SLIM X2 INS /CONTROL 7.4) JUSTEN        Insulin Infusion Pump Supplies (AUTOSOFT 90 INFUSION SET) MISC Inject 1 each Subcutaneous every 48 hours 45 each 4     Insulin Infusion Pump Supplies (T:SLIM X2 3ML CARTRIDGE) MISC Inject 1 each Subcutaneous every 48 hours 45 each 3     " insulin lispro (HUMALOG) 100 UNIT/ML vial To be used with insulin pump total daily dose 150 units 50 mL 3     insulin pen needle (31G X 8 MM) 31G X 8 MM miscellaneous Use 3 pen needles daily or as directed. 100 each 11     lisinopril (ZESTRIL) 5 MG tablet Take 1 tablet (5 mg) by mouth daily 90 tablet 3     senna-docusate (SENOKOT-S/PERICOLACE) 8.6-50 MG tablet Take 1-2 tablets by mouth 2 times daily Take while on oral narcotics to prevent or treat constipation. 30 tablet 0       Social History     Tobacco Use     Smoking status: Never     Smokeless tobacco: Never   Vaping Use     Vaping Use: Never used   Substance Use Topics     Alcohol use: Yes     Comment: socially     Drug use: Never       Fidencio Tomlinson, SAUD  1/19/2023  7:03 AM

## 2023-01-19 NOTE — PATIENT INSTRUCTIONS
Please schedule a 3-week follow up with Dr. Hill for IPP activation. (2/9/23 @ 12:00pm)    It was a pleasure meeting with you today.  Thank you for allowing me and my team the privilege of caring for you today.  YOU are the reason we are here, and I truly hope we provided you with the excellent service you deserve.  Please let us know if there is anything else we can do for you so that we can be sure you are leaving completely satisfied with your care experience.

## 2023-01-19 NOTE — LETTER
"1/19/2023       RE: Yaya Mg  [address] [facility] [address]     Dear Colleague,    Thank you for referring your patient, Yaya Mg, to the [facility] [place] [facility]. Please see a copy of my visit note below.    CC: Yaya Mg  is post-op from IPP, Titan, done on 1/3/22.  HPI: Patient is 2 wks post-op.  He has been doing well. No fevers or chills. Pain decreasing.     Exam: /86 (BP Location: Right arm, Patient Position: Sitting, Cuff Size: Adult Large)   Pulse 95   Ht 1.753 m (5' 9\")   Wt 104.3 kg (230 lb)   SpO2 97%   BMI 33.97 kg/m   . NAD.   Incision healing well. No discharge, erythema or fluctuence suggestive of infection.   Penis is deflated well.  Tips symmetric in the mid glans , pump is easily palpable in the anterior scrotum.  Model pump given.  No signs of infection.    Assessment  Erectile dysfunction secondary to DM type 1.    Post-op from IPP placement    PLAN:     Instructed him not to activate the device, to continue to feel for the pump.   OK to deflate if needed.  Return to clinic 3-4 weeks for activation.        Amandeep Hill MD      "

## 2023-01-19 NOTE — PROGRESS NOTES
"CC: Yaya Mg  is post-op from IPP, Titan, done on 1/3/22.  HPI: Patient is 2 wks post-op.  He has been doing well. No fevers or chills. Pain decreasing.     Exam: /86 (BP Location: Right arm, Patient Position: Sitting, Cuff Size: Adult Large)   Pulse 95   Ht 1.753 m (5' 9\")   Wt 104.3 kg (230 lb)   SpO2 97%   BMI 33.97 kg/m   . NAD.   Incision healing well. No discharge, erythema or fluctuence suggestive of infection.   Penis is deflated well.  Tips symmetric in the mid glans , pump is easily palpable in the anterior scrotum.  Model pump given.  No signs of infection.    Assessment  Erectile dysfunction secondary to DM type 1.    Post-op from IPP placement    PLAN:     Instructed him not to activate the device, to continue to feel for the pump.   OK to deflate if needed.  Return to clinic 3-4 weeks for activation.        "

## 2023-01-20 NOTE — OR NURSING
Second attempt to contact Patient regarding need for Preop for Colonoscopy scheduled on 2/02/23. Message left to return call.

## 2023-01-22 RX ORDER — BISACODYL 5 MG/1
TABLET, DELAYED RELEASE ORAL
Qty: 4 TABLET | Refills: 0 | Status: SHIPPED | OUTPATIENT
Start: 2023-01-22 | End: 2023-04-12

## 2023-01-24 ENCOUNTER — ANESTHESIA EVENT (OUTPATIENT)
Dept: SURGERY | Facility: HOSPITAL | Age: 48
End: 2023-01-24
Payer: COMMERCIAL

## 2023-01-24 NOTE — ANESTHESIA PREPROCEDURE EVALUATION
Anesthesia Pre-Procedure Evaluation    Patient: Yaya Mg   MRN: [id number] : 1975        Procedure : Procedure(s):  COLONOSCOPY          Past Medical History:   Diagnosis Date     Anxiety      Depressive disorder      Hypertension      Type 1 diabetes (H)       Past Surgical History:   Procedure Laterality Date     APPENDECTOMY  1985     BACK SURGERY  2010    lumbar calcium deposits removed     CATARACT EXTRACTION Right      IMPLANT PROSTHESIS PENIS INFLATABLE N/A 1/3/2023    Procedure: INSERTION of INFLATABLE PENILE PROSTHESIS;  Surgeon: Amandeep Hill MD;  Location: [facility]      No Known Allergies   Social History     Tobacco Use     Smoking status: Never     Smokeless tobacco: Never   Substance Use Topics     Alcohol use: Yes     Comment: socially      Wt Readings from Last 1 Encounters:   23 104.3 kg (230 lb)        Anesthesia Evaluation   Pt has had prior anesthetic. Type: MAC and General.    No history of anesthetic complications       ROS/MED HX  ENT/Pulmonary:     (+) RAMAKRISHNA risk factors, snores loudly, hypertension, obese, asthma  (-) tobacco use, COPD and recent URI   Neurologic:     (+) peripheral neuropathy, - burning sensation in feet.     Cardiovascular:     (+) Dyslipidemia hypertension-range: 100-120/60-70/ ----Previous cardiac testing   Echo: Date: 2022 Results:  Interpretation Summary  Global and regional left ventricular function is normal with an EF of 60-65%.  The right ventricle is normal size.  Global right ventricular function is normal.  Aortic valve is normal in structure and function.  Trace tricuspid insufficiency is present.  Stress Test: Date: Results:    ECG Reviewed: Date: Results:    Cath: Date: Results:   (-) CHF, MCCONNELL, orthopnea/PND and syncope   METS/Exercise Tolerance: >4 METS Comment: Per previous pre-op:     Denies any cardiopulmonary symptoms going up 2 flights of stairs. When he does heavy exertion (carrying a pack through the woods) with  cold air, he has had coughing fits which lead to him needing to catch his breath. This is being evaluated by PCP, with normal stress test, normal TTE, PFTs with mild obstruction and no symptom improvement with inhalers. Per patient, they are considering if this is vocal cord dysfunction.    Hematologic:  - neg hematologic  ROS     Musculoskeletal:  - neg musculoskeletal ROS     GI/Hepatic: Comment: Elevated LFTs    (+) bowel prep,  (-) GERD   Renal/Genitourinary:  - neg Renal ROS     Endo: Comment: Pt has CGM in right abdomen, insulin pump left side    (+) type I DM, Last HgA1c: 6.4, Using insulin, - using insulin pump. Normal glucose range: 130s, Obesity,     Psychiatric/Substance Use:     (+) psychiatric history depression and anxiety alcohol abuse (estimates occasionally 6 drinks watching football)     Infectious Disease:  - neg infectious disease ROS     Malignancy:  - neg malignancy ROS     Other:  - neg other ROS          Physical Exam    Airway  airway exam normal      Mallampati: II   TM distance: > 3 FB   Neck ROM: full   Mouth opening: > 3 cm    Respiratory Devices and Support         Dental    unable to assess        Cardiovascular   cardiovascular exam normal          Pulmonary   pulmonary exam normal        breath sounds clear to auscultation           OUTSIDE LABS:  CBC:   Lab Results   Component Value Date    WBC 6.9 01/04/2023    WBC 4.6 12/29/2022    HGB 13.3 01/04/2023    HGB 14.9 12/29/2022    HCT 37.8 (L) 01/04/2023    HCT 40.3 12/29/2022     01/04/2023     12/29/2022     BMP:   Lab Results   Component Value Date     01/04/2023     (L) 12/29/2022    POTASSIUM 4.3 01/04/2023    POTASSIUM 4.0 12/29/2022    CHLORIDE 104 01/04/2023    CHLORIDE 97 (L) 12/29/2022    CO2 25 01/04/2023    CO2 25 12/29/2022    BUN 13 01/04/2023    BUN 18.0 12/29/2022    CR 0.80 01/04/2023    CR 0.90 12/29/2022     (H) 01/04/2023     (H) 01/04/2023     COAGS: No results found for:  "PTT, INR, FIBR  POC: No results found for: BGM, HCG, HCGS  HEPATIC:   Lab Results   Component Value Date    ALBUMIN 4.4 11/10/2022    PROTTOTAL 6.7 11/10/2022    ALT 42 11/10/2022    AST 28 11/10/2022    ALKPHOS 114 11/10/2022    BILITOTAL 1.2 11/10/2022     OTHER:   Lab Results   Component Value Date    A1C 6.4 (H) 12/29/2022    SONIA 8.6 01/04/2023    TSH 0.96 12/15/2021    CRP <2.9 05/24/2022       Anesthesia Plan    ASA Status:  3   NPO Status:  NPO Appropriate    Anesthesia Type: MAC.     - Reason for MAC: straight local not clinically adequate   Induction: Intravenous, Propofol.   Maintenance: Balanced.        Consents    Anesthesia Plan(s) and associated risks, benefits, and realistic alternatives discussed. Questions answered and patient/representative(s) expressed understanding.     - Discussed: Risks, Benefits and Alternatives for BOTH SEDATION and the PROCEDURE were discussed     - Discussed with:  Patient      - Extended Intubation/Ventilatory Support Discussed: No.      - Patient is DNR/DNI Status: No    Use of blood products discussed: No .     Postoperative Care    Pain management: IV analgesics.   PONV prophylaxis: Ondansetron (or other 5HT-3), Dexamethasone or Solumedrol     Comments:    Other Comments: HP 1/26/23, no interval changes    CGM reading at 7:15am is 135. Pt states his insulin pump is set to \"sleep mode.\"    Risks and benefits of MAC anesthetic discussed including dental damage, aspiration, loss of airway, conversion to general anesthetic, CV complications, MI, stroke, death. Pt wishes to proceed.             Alexi Bravo, THOMAS CRNA  "

## 2023-01-25 NOTE — PROGRESS NOTES
[facility] [place]  [address]  [place] [address]  Phone: [phone]  Primary Provider: Wilma Gerber  Pre-op Performing Provider: WILMA GERBER      PREOPERATIVE EVALUATION:  Today's date: 1/26/2023    Yaya Mg is a 47 year old male who presents for a preoperative evaluation.    Surgical Information:  Surgery/Procedure: Colonoscopy  Surgery Location: [facility]  Surgeon: Dr. Nichols  Surgery Date: 2/2/23  Time of Surgery: TBD  Where patient plans to recover: At home with family  Fax number for surgical facility: Note does not need to be faxed, will be available electronically in Epic.    Type of Anesthesia Anticipated: Local with MAC    Assessment & Plan     The proposed surgical procedure is considered LOW risk.      ICD-10-CM    1. Pre-operative cardiovascular examination  Z01.810 CBC with Platelets & Differential     Basic metabolic panel      2. Positive colorectal cancer screening using Cologuard test  R19.5 CBC with Platelets & Differential     Basic metabolic panel      3. Type 1 diabetes mellitus without complication (H)  E10.9 CBC with Platelets & Differential     Basic metabolic panel      4. Cough variant asthma  J45.991 CBC with Platelets & Differential     Basic metabolic panel      5. Depression with anxiety  F41.8 escitalopram (LEXAPRO) 20 MG tablet      6. Adjustment insomnia  F51.02 traZODone (DESYREL) 50 MG tablet               Implanted Device:   - Type of device: penile prosthesis  Patient advised to bring device information on day of surgery.          Medication Instructions:  Patient is to take all scheduled medications on the day of surgery EXCEPT for modifications listed below:  Pt to hold morning oral meds to after surgery  Pt to go to night time/sleep basal rate at time he starts drinking prep and to watch DEXCOM closely      RECOMMENDATION:  APPROVAL GIVEN to proceed with proposed procedure, without further diagnostic evaluation.    PT SHOULD BE FIRST PT ON  SURGERY SCHEDULE DUE TO TYPE 1 DM.         Subjective     HPI related to upcoming procedure:   48 yO male  presents for cardiopulmonary/general clearance to undergo the above procedure.  His surgeon listed above has asked for this clearance to undergo anesthesia. Pt has had this condition for approximately - needs first colonoscopy.   Overall pt is of good health and has not  had any perioperative complications with previous surgeries.      Just had penile implant - no issues.     REcent GXT /Echo negative     Preop Questions 1/25/2023   1. Have you ever had a heart attack or stroke? No   2. Have you ever had surgery on your heart or blood vessels, such as a stent placement, a coronary artery bypass, or surgery on an artery in your head, neck, heart, or legs? No   3. Do you have chest pain with activity? No   4. Do you have a history of  heart failure? No   5. Do you currently have a cold, bronchitis or symptoms of other infection? No   6. Do you have a cough, shortness of breath, or wheezing? No   7. Do you or anyone in your family have previous history of blood clots? No   8. Do you or does anyone in your family have a serious bleeding problem such as prolonged bleeding following surgeries or cuts? No   9. Have you ever had problems with anemia or been told to take iron pills? No   10. Have you had any abnormal blood loss such as black, tarry or bloody stools? No   11. Have you ever had a blood transfusion? No   12. Are you willing to have a blood transfusion if it is medically needed before, during, or after your surgery? Yes   13. Have you or any of your relatives ever had problems with anesthesia? No   14. Do you have sleep apnea, excessive snoring or daytime drowsiness? No   15. Do you have any artifical heart valves or other implanted medical devices like a pacemaker, defibrillator, or continuous glucose monitor? YES - Dexcom    15a. What type of device do you have? CGM   15b. Name of the clinic that manages  your device:  [place]   16. Do you have artificial joints? No   17. Are you allergic to latex? No     Health Care Directive:  Patient does not have a Health Care Directive or Living Will: Discussed advance care planning with patient; however, patient declined at this time.    Preoperative Review of :   reviewed - no record of controlled substances prescribed.      Status of Chronic Conditions:      Review of Systems  Constitutional, neuro, ENT, endocrine, pulmonary, cardiac, gastrointestinal, genitourinary, musculoskeletal, integument and psychiatric systems are negative, except as otherwise noted.    Patient Active Problem List    Diagnosis Date Noted     Morbid obesity (H) 11/10/2022     Priority: Medium     Umbilical hernia without obstruction and without gangrene 11/10/2022     Priority: Medium     Burning sensation of feet 11/10/2022     Priority: Medium     Cough variant asthma 11/10/2022     Priority: Medium     FH: colon cancer in relative diagnosed at >50 years old 11/10/2022     Priority: Medium     Erectile dysfunction due to diseases classified elsewhere 12/15/2021     Priority: Medium     Type 1 diabetes mellitus without complication (H) 12/15/2021     Priority: Medium     Depression with anxiety 12/15/2021     Priority: Medium     Mixed hyperlipidemia 12/15/2021     Priority: Medium     Elevated LFTs 12/15/2021     Priority: Medium      Past Medical History:   Diagnosis Date     Anxiety      Depressive disorder      Hypertension      Type 1 diabetes (H)      Past Surgical History:   Procedure Laterality Date     APPENDECTOMY  01/1985     BACK SURGERY  2010    lumbar calcium deposits removed     CATARACT EXTRACTION Right 2021     IMPLANT PROSTHESIS PENIS INFLATABLE N/A 1/3/2023    Procedure: INSERTION of INFLATABLE PENILE PROSTHESIS;  Surgeon: Amandeep Hill MD;  Location: [facility] OR     Current Outpatient Medications   Medication Sig Dispense Refill     albuterol (PROAIR HFA/PROVENTIL  "HFA/VENTOLIN HFA) 108 (90 Base) MCG/ACT inhaler Inhale 2 puffs into the lungs every 6 hours 18 g 4     atorvastatin (LIPITOR) 20 MG tablet Take 1 tablet (20 mg) by mouth daily 90 tablet 3     BD INSULIN SYRINGE ULTRAFINE 30G X 1/2\" 1 ML miscellaneous USE AS NEEDED 100 each 5     bisacodyl (DULCOLAX) 5 MG EC tablet Take 2 tabs at bedtime 2 nights prior to procedure. Take 2 tabs at 3pm the day before procedure. 4 tablet 0     budesonide-formoterol (SYMBICORT) 80-4.5 MCG/ACT Inhaler Inhale 2 puffs into the lungs 2 times daily 10 g 3     Continuous Blood Gluc  (DEXCOM G6 ) JUSTEN        Continuous Blood Gluc Sensor (DEXCOM G6 SENSOR) MISC 1 each every 10 days Change every 10 days. 9 each 4     Continuous Blood Gluc Transmit (DEXCOM G6 TRANSMITTER) MISC 1 each every 3 months Change every 3 months. 1 each 4     escitalopram (LEXAPRO) 20 MG tablet Take 1 tablet (20 mg) by mouth daily 90 tablet 3     gabapentin (NEURONTIN) 300 MG capsule 1-2 capsules at night as needed 60 capsule 3     Insulin Infusion Pump (T: SLIM X2 INS /CONTROL 7.4) JUSTEN        Insulin Infusion Pump Supplies (AUTOSOFT 90 INFUSION SET) MISC Inject 1 each Subcutaneous every 48 hours 45 each 4     Insulin Infusion Pump Supplies (T:SLIM X2 3ML CARTRIDGE) MISC Inject 1 each Subcutaneous every 48 hours 45 each 3     insulin lispro (HUMALOG) 100 UNIT/ML vial To be used with insulin pump total daily dose 150 units 50 mL 3     insulin pen needle (31G X 8 MM) 31G X 8 MM miscellaneous Use 3 pen needles daily or as directed. 100 each 11     lisinopril (ZESTRIL) 5 MG tablet Take 1 tablet (5 mg) by mouth daily 90 tablet 3     polyethylene glycol (GOLYTELY) 236 g suspension Drink 8 ounces every 10 minutes until the jug is half-empty at 6pm the night before procedure. Refrigerate. Repeat 6 hours prior to procedure. 4000 mL 0     senna-docusate (SENOKOT-S/PERICOLACE) 8.6-50 MG tablet Take 1-2 tablets by mouth 2 times daily Take while on oral narcotics " "to prevent or treat constipation. 30 tablet 0       No Known Allergies     Social History     Tobacco Use     Smoking status: Never     Smokeless tobacco: Never   Substance Use Topics     Alcohol use: Yes     Comment: socially     Family History   Problem Relation Age of Onset     Hypertension Father      Hyperlipidemia Father      Obesity Father      Obesity Brother      Hypertension Brother      Depression Brother      History   Drug Use Unknown         Objective     /70   Pulse 80   Temp 97  F (36.1  C) (Tympanic)   Ht 1.753 m (5' 9\")   Wt 108.9 kg (240 lb)   SpO2 98%   BMI 35.44 kg/m      Physical Exam    GENERAL APPEARANCE: healthy, alert and no distress     EYES: EOMI,  PERRL     HENT: ear canals and TM's normal and nose and mouth without ulcers or lesions     NECK: no adenopathy, no asymmetry, masses, or scars and thyroid normal to palpation     RESP: lungs clear to auscultation - no rales, rhonchi or wheezes     CV: regular rates and rhythm, normal S1 S2, no S3 or S4 and no murmur, click or rub     ABDOMEN:  soft, nontender, no HSM or masses and bowel sounds normal     MS: extremities normal- no gross deformities noted, no evidence of inflammation in joints, FROM in all extremities.     SKIN: no suspicious lesions or rashes     NEURO: Normal strength and tone, sensory exam grossly normal, mentation intact and speech normal     PSYCH: mentation appears normal. and affect normal/bright     LYMPHATICS: No cervical adenopathy    Recent Labs   Lab Test 01/04/23  0832 12/29/22  0914 11/10/22  1405 10/04/22  0751   HGB 13.3 14.9  --  14.9    202  --  180    135*   < >  --    POTASSIUM 4.3 4.0   < >  --    CR 0.80 0.90   < >  --    A1C  --  6.4*  --  6.6*    < > = values in this interval not displayed.        Diagnostics:  Recent Results (from the past 24 hour(s))   Basic metabolic panel    Collection Time: 01/26/23  8:56 AM   Result Value Ref Range    Sodium 138 136 - 145 mmol/L    " Potassium 4.3 3.4 - 5.3 mmol/L    Chloride 102 98 - 107 mmol/L    Carbon Dioxide (CO2) 24 22 - 29 mmol/L    Anion Gap 12 7 - 15 mmol/L    Urea Nitrogen 10.5 6.0 - 20.0 mg/dL    Creatinine 0.85 0.67 - 1.17 mg/dL    Calcium      Glucose 155 (H) 70 - 99 mg/dL    GFR Estimate >90 >60 mL/min/1.73m2   CBC with platelets and differential    Collection Time: 01/26/23  8:56 AM   Result Value Ref Range    WBC Count 4.8 4.0 - 11.0 10e3/uL    RBC Count 5.00 4.40 - 5.90 10e6/uL    Hemoglobin 14.7 13.3 - 17.7 g/dL    Hematocrit 41.2 40.0 - 53.0 %    MCV 82 78 - 100 fL    MCH 29.4 26.5 - 33.0 pg    MCHC 35.7 31.5 - 36.5 g/dL    RDW 13.0 10.0 - 15.0 %    Platelet Count 228 150 - 450 10e3/uL    % Neutrophils 55 %    % Lymphocytes 25 %    % Monocytes 15 %    % Eosinophils 4 %    % Basophils 1 %    % Immature Granulocytes 0 %    NRBCs per 100 WBC 0 <1 /100    Absolute Neutrophils 2.6 1.6 - 8.3 10e3/uL    Absolute Lymphocytes 1.2 0.8 - 5.3 10e3/uL    Absolute Monocytes 0.7 0.0 - 1.3 10e3/uL    Absolute Eosinophils 0.2 0.0 - 0.7 10e3/uL    Absolute Basophils 0.1 0.0 - 0.2 10e3/uL    Absolute Immature Granulocytes 0.0 <=0.4 10e3/uL    Absolute NRBCs 0.0 10e3/uL      EKG: appears normal, NSR, normal axis, normal intervals, no acute ST/T changes c/w ischemia, no LVH by voltage criteria, unchanged from previous tracings    Revised Cardiac Risk Index (RCRI):  The patient has the following serious cardiovascular risks for perioperative complications:   - Diabetes Mellitus (on Insulin) = 1 point     RCRI Interpretation: 1 point: Class II (low risk - 0.9% complication rate)           Signed Electronically by: Selwyn Ashraf MD  Copy of this evaluation report is provided to requesting physician.

## 2023-01-25 NOTE — H&P (VIEW-ONLY)
[facility] [place]  [address]  [place] [address]  Phone: [phone]  Primary Provider: Wilma Gerber  Pre-op Performing Provider: WILMA GERBER      PREOPERATIVE EVALUATION:  Today's date: 1/26/2023    Yaya Mg is a 47 year old male who presents for a preoperative evaluation.    Surgical Information:  Surgery/Procedure: Colonoscopy  [place] [facility] [place]  [place]geon: Dr. Nichols  Surgery Date: 2/2/23  Time of Surgery: TBD  Where patient plans to recover: At home with family  Fax number for surgical facility: Note does not need to be faxed, will be available electronically in Epic.    Type of Anesthesia Anticipated: Local with MAC    Assessment & Plan     The proposed surgical procedure is considered LOW risk.      ICD-10-CM    1. Pre-operative cardiovascular examination  Z01.810 CBC with Platelets & Differential     Basic metabolic panel      2. Positive colorectal cancer screening using Cologuard test  R19.5 CBC with Platelets & Differential     Basic metabolic panel      3. Type 1 diabetes mellitus without complication (H)  E10.9 CBC with Platelets & Differential     Basic metabolic panel      4. Cough variant asthma  J45.991 CBC with Platelets & Differential     Basic metabolic panel      5. Depression with anxiety  F41.8 escitalopram (LEXAPRO) 20 MG tablet      6. Adjustment insomnia  F51.02 traZODone (DESYREL) 50 MG tablet               Implanted Device:   - Type of device: penile prosthesis  Patient advised to bring device information on day of surgery.          Medication Instructions:  Patient is to take all scheduled medications on the day of surgery EXCEPT for modifications listed below:  Pt to hold morning oral meds to after surgery  Pt to go to night time/sleep basal rate at time he starts drinking prep and to watch DEXCOM closely      RECOMMENDATION:  APPROVAL GIVEN to proceed with proposed procedure, without further diagnostic evaluation.    PT SHOULD BE FIRST PT ON  SURGERY SCHEDULE DUE TO TYPE 1 DM.         Subjective     HPI related to upcoming procedure:   48 yO male  presents for cardiopulmonary/general clearance to undergo the above procedure.  His surgeon listed above has asked for this clearance to undergo anesthesia. Pt has had this condition for approximately - needs first colonoscopy.   Overall pt is of good health and has not  had any perioperative complications with previous surgeries.      Just had penile implant - no issues.     REcent GXT /Echo negative     Preop Questions 1/25/2023   1. Have you ever had a heart attack or stroke? No   2. Have you ever had surgery on your heart or blood vessels, such as a stent placement, a coronary artery bypass, or surgery on an artery in your head, neck, heart, or legs? No   3. Do you have chest pain with activity? No   4. Do you have a history of  heart failure? No   5. Do you currently have a cold, bronchitis or symptoms of other infection? No   6. Do you have a cough, shortness of breath, or wheezing? No   7. Do you or anyone in your family have previous history of blood clots? No   8. Do you or does anyone in your family have a serious bleeding problem such as prolonged bleeding following surgeries or cuts? No   9. Have you ever had problems with anemia or been told to take iron pills? No   10. Have you had any abnormal blood loss such as black, tarry or bloody stools? No   11. Have you ever had a blood transfusion? No   12. Are you willing to have a blood transfusion if it is medically needed before, during, or after your surgery? Yes   13. Have you or any of your relatives ever had problems with anesthesia? No   14. Do you have sleep apnea, excessive snoring or daytime drowsiness? No   15. Do you have any artifical heart valves or other implanted medical devices like a pacemaker, defibrillator, or continuous glucose monitor? YES - Dexcom    15a. What type of device do you have? CGM   15b. Name of the clinic that manages  your device:  [facility]   16. Do you have artificial joints? No   17. Are you allergic to latex? No     Health Care Directive:  Patient does not have a Health Care Directive or Living Will: Discussed advance care planning with patient; however, patient declined at this time.    Preoperative Review of :   reviewed - no record of controlled substances prescribed.      Status of Chronic Conditions:      Review of Systems  Constitutional, neuro, ENT, endocrine, pulmonary, cardiac, gastrointestinal, genitourinary, musculoskeletal, integument and psychiatric systems are negative, except as otherwise noted.    Patient Active Problem List    Diagnosis Date Noted     Morbid obesity (H) 11/10/2022     Priority: Medium     Umbilical hernia without obstruction and without gangrene 11/10/2022     Priority: Medium     Burning sensation of feet 11/10/2022     Priority: Medium     Cough variant asthma 11/10/2022     Priority: Medium     FH: colon cancer in relative diagnosed at >50 years old 11/10/2022     Priority: Medium     Erectile dysfunction due to diseases classified elsewhere 12/15/2021     Priority: Medium     Type 1 diabetes mellitus without complication (H) 12/15/2021     Priority: Medium     Depression with anxiety 12/15/2021     Priority: Medium     Mixed hyperlipidemia 12/15/2021     Priority: Medium     Elevated LFTs 12/15/2021     Priority: Medium      Past Medical History:   Diagnosis Date     Anxiety      Depressive disorder      Hypertension      Type 1 diabetes (H)      Past Surgical History:   Procedure Laterality Date     APPENDECTOMY  01/1985     BACK SURGERY  2010    lumbar calcium deposits removed     CATARACT EXTRACTION Right 2021     IMPLANT PROSTHESIS PENIS INFLATABLE N/A 1/3/2023    Procedure: INSERTION of INFLATABLE PENILE PROSTHESIS;  Surgeon: Amandeep Hill MD;  Location: [facility] OR     Current Outpatient Medications   Medication Sig Dispense Refill     albuterol (PROAIR HFA/PROVENTIL  "HFA/VENTOLIN HFA) 108 (90 Base) MCG/ACT inhaler Inhale 2 puffs into the lungs every 6 hours 18 g 4     atorvastatin (LIPITOR) 20 MG tablet Take 1 tablet (20 mg) by mouth daily 90 tablet 3     BD INSULIN SYRINGE ULTRAFINE 30G X 1/2\" 1 ML miscellaneous USE AS NEEDED 100 each 5     bisacodyl (DULCOLAX) 5 MG EC tablet Take 2 tabs at bedtime 2 nights prior to procedure. Take 2 tabs at 3pm the day before procedure. 4 tablet 0     budesonide-formoterol (SYMBICORT) 80-4.5 MCG/ACT Inhaler Inhale 2 puffs into the lungs 2 times daily 10 g 3     Continuous Blood Gluc  (DEXCOM G6 ) JUSTEN        Continuous Blood Gluc Sensor (DEXCOM G6 SENSOR) MISC 1 each every 10 days Change every 10 days. 9 each 4     Continuous Blood Gluc Transmit (DEXCOM G6 TRANSMITTER) MISC 1 each every 3 months Change every 3 months. 1 each 4     escitalopram (LEXAPRO) 20 MG tablet Take 1 tablet (20 mg) by mouth daily 90 tablet 3     gabapentin (NEURONTIN) 300 MG capsule 1-2 capsules at night as needed 60 capsule 3     Insulin Infusion Pump (T: SLIM X2 INS /CONTROL 7.4) JUSTEN        Insulin Infusion Pump Supplies (AUTOSOFT 90 INFUSION SET) MISC Inject 1 each Subcutaneous every 48 hours 45 each 4     Insulin Infusion Pump Supplies (T:SLIM X2 3ML CARTRIDGE) MISC Inject 1 each Subcutaneous every 48 hours 45 each 3     insulin lispro (HUMALOG) 100 UNIT/ML vial To be used with insulin pump total daily dose 150 units 50 mL 3     insulin pen needle (31G X 8 MM) 31G X 8 MM miscellaneous Use 3 pen needles daily or as directed. 100 each 11     lisinopril (ZESTRIL) 5 MG tablet Take 1 tablet (5 mg) by mouth daily 90 tablet 3     polyethylene glycol (GOLYTELY) 236 g suspension Drink 8 ounces every 10 minutes until the jug is half-empty at 6pm the night before procedure. Refrigerate. Repeat 6 hours prior to procedure. 4000 mL 0     senna-docusate (SENOKOT-S/PERICOLACE) 8.6-50 MG tablet Take 1-2 tablets by mouth 2 times daily Take while on oral narcotics " "to prevent or treat constipation. 30 tablet 0       No Known Allergies     Social History     Tobacco Use     Smoking status: Never     Smokeless tobacco: Never   Substance Use Topics     Alcohol use: Yes     Comment: socially     Family History   Problem Relation Age of Onset     Hypertension Father      Hyperlipidemia Father      Obesity Father      Obesity Brother      Hypertension Brother      Depression Brother      History   Drug Use Unknown         Objective     /70   Pulse 80   Temp 97  F (36.1  C) (Tympanic)   Ht 1.753 m (5' 9\")   Wt 108.9 kg (240 lb)   SpO2 98%   BMI 35.44 kg/m      Physical Exam    GENERAL APPEARANCE: healthy, alert and no distress     EYES: EOMI,  PERRL     HENT: ear canals and TM's normal and nose and mouth without ulcers or lesions     NECK: no adenopathy, no asymmetry, masses, or scars and thyroid normal to palpation     RESP: lungs clear to auscultation - no rales, rhonchi or wheezes     CV: regular rates and rhythm, normal S1 S2, no S3 or S4 and no murmur, click or rub     ABDOMEN:  soft, nontender, no HSM or masses and bowel sounds normal     MS: extremities normal- no gross deformities noted, no evidence of inflammation in joints, FROM in all extremities.     SKIN: no suspicious lesions or rashes     NEURO: Normal strength and tone, sensory exam grossly normal, mentation intact and speech normal     PSYCH: mentation appears normal. and affect normal/bright     LYMPHATICS: No cervical adenopathy    Recent Labs   Lab Test 01/04/23  0832 12/29/22  0914 11/10/22  1405 10/04/22  0751   HGB 13.3 14.9  --  14.9    202  --  180    135*   < >  --    POTASSIUM 4.3 4.0   < >  --    CR 0.80 0.90   < >  --    A1C  --  6.4*  --  6.6*    < > = values in this interval not displayed.        Diagnostics:  Recent Results (from the past 24 hour(s))   Basic metabolic panel    Collection Time: 01/26/23  8:56 AM   Result Value Ref Range    Sodium 138 136 - 145 mmol/L    " Potassium 4.3 3.4 - 5.3 mmol/L    Chloride 102 98 - 107 mmol/L    Carbon Dioxide (CO2) 24 22 - 29 mmol/L    Anion Gap 12 7 - 15 mmol/L    Urea Nitrogen 10.5 6.0 - 20.0 mg/dL    Creatinine 0.85 0.67 - 1.17 mg/dL    Calcium      Glucose 155 (H) 70 - 99 mg/dL    GFR Estimate >90 >60 mL/min/1.73m2   CBC with platelets and differential    Collection Time: 01/26/23  8:56 AM   Result Value Ref Range    WBC Count 4.8 4.0 - 11.0 10e3/uL    RBC Count 5.00 4.40 - 5.90 10e6/uL    Hemoglobin 14.7 13.3 - 17.7 g/dL    Hematocrit 41.2 40.0 - 53.0 %    MCV 82 78 - 100 fL    MCH 29.4 26.5 - 33.0 pg    MCHC 35.7 31.5 - 36.5 g/dL    RDW 13.0 10.0 - 15.0 %    Platelet Count 228 150 - 450 10e3/uL    % Neutrophils 55 %    % Lymphocytes 25 %    % Monocytes 15 %    % Eosinophils 4 %    % Basophils 1 %    % Immature Granulocytes 0 %    NRBCs per 100 WBC 0 <1 /100    Absolute Neutrophils 2.6 1.6 - 8.3 10e3/uL    Absolute Lymphocytes 1.2 0.8 - 5.3 10e3/uL    Absolute Monocytes 0.7 0.0 - 1.3 10e3/uL    Absolute Eosinophils 0.2 0.0 - 0.7 10e3/uL    Absolute Basophils 0.1 0.0 - 0.2 10e3/uL    Absolute Immature Granulocytes 0.0 <=0.4 10e3/uL    Absolute NRBCs 0.0 10e3/uL      EKG: appears normal, NSR, normal axis, normal intervals, no acute ST/T changes c/w ischemia, no LVH by voltage criteria, unchanged from previous tracings    Revised Cardiac Risk Index (RCRI):  The patient has the following serious cardiovascular risks for perioperative complications:   - Diabetes Mellitus (on Insulin) = 1 point     RCRI Interpretation: 1 point: Class II (low risk - 0.9% complication rate)           Signed Electronically by: Selwyn Ashraf MD  Copy of this evaluation report is provided to requesting physician.

## 2023-01-26 ENCOUNTER — OFFICE VISIT (OUTPATIENT)
Dept: FAMILY MEDICINE | Facility: OTHER | Age: 48
End: 2023-01-26
Attending: FAMILY MEDICINE
Payer: COMMERCIAL

## 2023-01-26 ENCOUNTER — LAB (OUTPATIENT)
Dept: LAB | Facility: OTHER | Age: 48
End: 2023-01-26
Attending: FAMILY MEDICINE
Payer: COMMERCIAL

## 2023-01-26 VITALS
HEIGHT: 69 IN | BODY MASS INDEX: 35.55 KG/M2 | TEMPERATURE: 97 F | DIASTOLIC BLOOD PRESSURE: 70 MMHG | SYSTOLIC BLOOD PRESSURE: 110 MMHG | HEART RATE: 80 BPM | WEIGHT: 240 LBS | OXYGEN SATURATION: 98 %

## 2023-01-26 DIAGNOSIS — J45.991 COUGH VARIANT ASTHMA: ICD-10-CM

## 2023-01-26 DIAGNOSIS — R19.5 POSITIVE COLORECTAL CANCER SCREENING USING COLOGUARD TEST: ICD-10-CM

## 2023-01-26 DIAGNOSIS — E10.9 TYPE 1 DIABETES MELLITUS WITHOUT COMPLICATION (H): ICD-10-CM

## 2023-01-26 DIAGNOSIS — Z01.810 PRE-OPERATIVE CARDIOVASCULAR EXAMINATION: Primary | ICD-10-CM

## 2023-01-26 DIAGNOSIS — F41.8 DEPRESSION WITH ANXIETY: ICD-10-CM

## 2023-01-26 DIAGNOSIS — F51.02 ADJUSTMENT INSOMNIA: ICD-10-CM

## 2023-01-26 DIAGNOSIS — Z01.810 PRE-OPERATIVE CARDIOVASCULAR EXAMINATION: ICD-10-CM

## 2023-01-26 LAB
ANION GAP SERPL CALCULATED.3IONS-SCNC: 12 MMOL/L (ref 7–15)
BASOPHILS # BLD AUTO: 0.1 10E3/UL (ref 0–0.2)
BASOPHILS NFR BLD AUTO: 1 %
BUN SERPL-MCNC: 10.5 MG/DL (ref 6–20)
CALCIUM SERPL-MCNC: 9.5 MG/DL (ref 8.6–10)
CHLORIDE SERPL-SCNC: 102 MMOL/L (ref 98–107)
CREAT SERPL-MCNC: 0.85 MG/DL (ref 0.67–1.17)
DEPRECATED HCO3 PLAS-SCNC: 24 MMOL/L (ref 22–29)
EOSINOPHIL # BLD AUTO: 0.2 10E3/UL (ref 0–0.7)
EOSINOPHIL NFR BLD AUTO: 4 %
ERYTHROCYTE [DISTWIDTH] IN BLOOD BY AUTOMATED COUNT: 13 % (ref 10–15)
GFR SERPL CREATININE-BSD FRML MDRD: >90 ML/MIN/1.73M2
GLUCOSE SERPL-MCNC: 155 MG/DL (ref 70–99)
HCT VFR BLD AUTO: 41.2 % (ref 40–53)
HGB BLD-MCNC: 14.7 G/DL (ref 13.3–17.7)
IMM GRANULOCYTES # BLD: 0 10E3/UL
IMM GRANULOCYTES NFR BLD: 0 %
LYMPHOCYTES # BLD AUTO: 1.2 10E3/UL (ref 0.8–5.3)
LYMPHOCYTES NFR BLD AUTO: 25 %
MCH RBC QN AUTO: 29.4 PG (ref 26.5–33)
MCHC RBC AUTO-ENTMCNC: 35.7 G/DL (ref 31.5–36.5)
MCV RBC AUTO: 82 FL (ref 78–100)
MONOCYTES # BLD AUTO: 0.7 10E3/UL (ref 0–1.3)
MONOCYTES NFR BLD AUTO: 15 %
NEUTROPHILS # BLD AUTO: 2.6 10E3/UL (ref 1.6–8.3)
NEUTROPHILS NFR BLD AUTO: 55 %
NRBC # BLD AUTO: 0 10E3/UL
NRBC BLD AUTO-RTO: 0 /100
PLATELET # BLD AUTO: 228 10E3/UL (ref 150–450)
POTASSIUM SERPL-SCNC: 4.3 MMOL/L (ref 3.4–5.3)
RBC # BLD AUTO: 5 10E6/UL (ref 4.4–5.9)
SODIUM SERPL-SCNC: 138 MMOL/L (ref 136–145)
WBC # BLD AUTO: 4.8 10E3/UL (ref 4–11)

## 2023-01-26 PROCEDURE — 80048 BASIC METABOLIC PNL TOTAL CA: CPT

## 2023-01-26 PROCEDURE — 36415 COLL VENOUS BLD VENIPUNCTURE: CPT

## 2023-01-26 PROCEDURE — 99214 OFFICE O/P EST MOD 30 MIN: CPT | Performed by: FAMILY MEDICINE

## 2023-01-26 PROCEDURE — 85025 COMPLETE CBC W/AUTO DIFF WBC: CPT

## 2023-01-26 RX ORDER — TRAZODONE HYDROCHLORIDE 50 MG/1
TABLET, FILM COATED ORAL
Qty: 30 TABLET | Refills: 3 | Status: SHIPPED | OUTPATIENT
Start: 2023-01-26 | End: 2023-04-27

## 2023-01-26 RX ORDER — POLYETHYLENE GLYCOL 3350, SODIUM CHLORIDE, SODIUM BICARBONATE, POTASSIUM CHLORIDE 420; 11.2; 5.72; 1.48 G/4L; G/4L; G/4L; G/4L
POWDER, FOR SOLUTION ORAL
COMMUNITY
Start: 2023-01-22 | End: 2023-04-12

## 2023-01-26 RX ORDER — ESCITALOPRAM OXALATE 20 MG/1
20 TABLET ORAL DAILY
Qty: 90 TABLET | Refills: 3 | Status: SHIPPED | OUTPATIENT
Start: 2023-01-26 | End: 2023-03-30

## 2023-01-26 ASSESSMENT — PAIN SCALES - GENERAL: PAINLEVEL: NO PAIN (0)

## 2023-02-01 ASSESSMENT — COPD QUESTIONNAIRES: COPD: 0

## 2023-02-01 ASSESSMENT — ENCOUNTER SYMPTOMS: ORTHOPNEA: 0

## 2023-02-01 ASSESSMENT — LIFESTYLE VARIABLES: TOBACCO_USE: 0

## 2023-02-02 ENCOUNTER — ANESTHESIA (OUTPATIENT)
Dept: SURGERY | Facility: HOSPITAL | Age: 48
End: 2023-02-02
Payer: COMMERCIAL

## 2023-02-02 ENCOUNTER — HOSPITAL ENCOUNTER (OUTPATIENT)
Facility: HOSPITAL | Age: 48
Discharge: HOME | End: 2023-02-02
Attending: SURGERY | Admitting: SURGERY
Payer: COMMERCIAL

## 2023-02-02 VITALS
OXYGEN SATURATION: 95 % | RESPIRATION RATE: 16 BRPM | TEMPERATURE: 97.2 F | SYSTOLIC BLOOD PRESSURE: 122 MMHG | HEART RATE: 80 BPM | DIASTOLIC BLOOD PRESSURE: 86 MMHG

## 2023-02-02 PROCEDURE — 710N000012 HC RECOVERY PHASE 2, PER MINUTE: Performed by: SURGERY

## 2023-02-02 PROCEDURE — 370N000017 HC ANESTHESIA TECHNICAL FEE, PER MIN: Performed by: SURGERY

## 2023-02-02 PROCEDURE — 88305 TISSUE EXAM BY PATHOLOGIST: CPT | Mod: TC | Performed by: SURGERY

## 2023-02-02 PROCEDURE — 250N000011 HC RX IP 250 OP 636: Performed by: NURSE ANESTHETIST, CERTIFIED REGISTERED

## 2023-02-02 PROCEDURE — 250N000009 HC RX 250: Performed by: NURSE ANESTHETIST, CERTIFIED REGISTERED

## 2023-02-02 PROCEDURE — 258N000003 HC RX IP 258 OP 636: Performed by: NURSE ANESTHETIST, CERTIFIED REGISTERED

## 2023-02-02 PROCEDURE — 45385 COLONOSCOPY W/LESION REMOVAL: CPT | Performed by: NURSE ANESTHETIST, CERTIFIED REGISTERED

## 2023-02-02 PROCEDURE — 999N000141 HC STATISTIC PRE-PROCEDURE NURSING ASSESSMENT: Performed by: SURGERY

## 2023-02-02 PROCEDURE — 360N000075 HC SURGERY LEVEL 2, PER MIN: Performed by: SURGERY

## 2023-02-02 PROCEDURE — 45385 COLONOSCOPY W/LESION REMOVAL: CPT | Performed by: SURGERY

## 2023-02-02 PROCEDURE — 272N000001 HC OR GENERAL SUPPLY STERILE: Performed by: SURGERY

## 2023-02-02 PROCEDURE — 88305 TISSUE EXAM BY PATHOLOGIST: CPT | Mod: 26 | Performed by: PATHOLOGY

## 2023-02-02 RX ORDER — ONDANSETRON 4 MG/1
4 TABLET, ORALLY DISINTEGRATING ORAL EVERY 30 MIN PRN
Status: DISCONTINUED | OUTPATIENT
Start: 2023-02-02 | End: 2023-02-02 | Stop reason: HOSPADM

## 2023-02-02 RX ORDER — FENTANYL CITRATE 50 UG/ML
50 INJECTION, SOLUTION INTRAMUSCULAR; INTRAVENOUS EVERY 5 MIN PRN
Status: DISCONTINUED | OUTPATIENT
Start: 2023-02-02 | End: 2023-02-02 | Stop reason: HOSPADM

## 2023-02-02 RX ORDER — NALOXONE HYDROCHLORIDE 0.4 MG/ML
0.2 INJECTION, SOLUTION INTRAMUSCULAR; INTRAVENOUS; SUBCUTANEOUS
Status: DISCONTINUED | OUTPATIENT
Start: 2023-02-02 | End: 2023-02-02 | Stop reason: HOSPADM

## 2023-02-02 RX ORDER — PROPOFOL 10 MG/ML
INJECTION, EMULSION INTRAVENOUS PRN
Status: DISCONTINUED | OUTPATIENT
Start: 2023-02-02 | End: 2023-02-02

## 2023-02-02 RX ORDER — ONDANSETRON 2 MG/ML
4 INJECTION INTRAMUSCULAR; INTRAVENOUS EVERY 30 MIN PRN
Status: DISCONTINUED | OUTPATIENT
Start: 2023-02-02 | End: 2023-02-02 | Stop reason: HOSPADM

## 2023-02-02 RX ORDER — LIDOCAINE HYDROCHLORIDE 20 MG/ML
INJECTION, SOLUTION INFILTRATION; PERINEURAL PRN
Status: DISCONTINUED | OUTPATIENT
Start: 2023-02-02 | End: 2023-02-02

## 2023-02-02 RX ORDER — LABETALOL 20 MG/4 ML (5 MG/ML) INTRAVENOUS SYRINGE
10
Status: DISCONTINUED | OUTPATIENT
Start: 2023-02-02 | End: 2023-02-02 | Stop reason: HOSPADM

## 2023-02-02 RX ORDER — LIDOCAINE 40 MG/G
CREAM TOPICAL
Status: DISCONTINUED | OUTPATIENT
Start: 2023-02-02 | End: 2023-02-02 | Stop reason: HOSPADM

## 2023-02-02 RX ORDER — HYDRALAZINE HYDROCHLORIDE 20 MG/ML
2.5-5 INJECTION INTRAMUSCULAR; INTRAVENOUS EVERY 10 MIN PRN
Status: DISCONTINUED | OUTPATIENT
Start: 2023-02-02 | End: 2023-02-02 | Stop reason: HOSPADM

## 2023-02-02 RX ORDER — NALOXONE HYDROCHLORIDE 0.4 MG/ML
0.4 INJECTION, SOLUTION INTRAMUSCULAR; INTRAVENOUS; SUBCUTANEOUS
Status: DISCONTINUED | OUTPATIENT
Start: 2023-02-02 | End: 2023-02-02 | Stop reason: HOSPADM

## 2023-02-02 RX ORDER — SODIUM CHLORIDE, SODIUM LACTATE, POTASSIUM CHLORIDE, CALCIUM CHLORIDE 600; 310; 30; 20 MG/100ML; MG/100ML; MG/100ML; MG/100ML
INJECTION, SOLUTION INTRAVENOUS CONTINUOUS
Status: DISCONTINUED | OUTPATIENT
Start: 2023-02-02 | End: 2023-02-02 | Stop reason: HOSPADM

## 2023-02-02 RX ORDER — MULTIVITAMIN,THERAPEUTIC
1 TABLET ORAL DAILY
COMMUNITY
End: 2023-12-08

## 2023-02-02 RX ADMIN — PROPOFOL 20 MG: 10 INJECTION, EMULSION INTRAVENOUS at 07:45

## 2023-02-02 RX ADMIN — SODIUM CHLORIDE, POTASSIUM CHLORIDE, SODIUM LACTATE AND CALCIUM CHLORIDE: 600; 310; 30; 20 INJECTION, SOLUTION INTRAVENOUS at 07:09

## 2023-02-02 RX ADMIN — PROPOFOL 10 MG: 10 INJECTION, EMULSION INTRAVENOUS at 07:39

## 2023-02-02 RX ADMIN — PROPOFOL 40 MG: 10 INJECTION, EMULSION INTRAVENOUS at 07:35

## 2023-02-02 RX ADMIN — PROPOFOL 20 MG: 10 INJECTION, EMULSION INTRAVENOUS at 07:42

## 2023-02-02 RX ADMIN — PROPOFOL 30 MG: 10 INJECTION, EMULSION INTRAVENOUS at 07:46

## 2023-02-02 RX ADMIN — PROPOFOL 50 MG: 10 INJECTION, EMULSION INTRAVENOUS at 07:38

## 2023-02-02 RX ADMIN — PROPOFOL 50 MG: 10 INJECTION, EMULSION INTRAVENOUS at 07:37

## 2023-02-02 RX ADMIN — PROPOFOL 50 MG: 10 INJECTION, EMULSION INTRAVENOUS at 07:34

## 2023-02-02 RX ADMIN — PROPOFOL 10 MG: 10 INJECTION, EMULSION INTRAVENOUS at 07:44

## 2023-02-02 RX ADMIN — PROPOFOL 20 MG: 10 INJECTION, EMULSION INTRAVENOUS at 07:40

## 2023-02-02 RX ADMIN — PROPOFOL 30 MG: 10 INJECTION, EMULSION INTRAVENOUS at 07:41

## 2023-02-02 RX ADMIN — PROPOFOL 20 MG: 10 INJECTION, EMULSION INTRAVENOUS at 07:43

## 2023-02-02 RX ADMIN — LIDOCAINE HYDROCHLORIDE 40 MG: 20 INJECTION, SOLUTION INFILTRATION; PERINEURAL at 07:34

## 2023-02-02 ASSESSMENT — ACTIVITIES OF DAILY LIVING (ADL): ADLS_ACUITY_SCORE: 35

## 2023-02-02 NOTE — INTERVAL H&P NOTE
"I have reviewed the surgical (or preoperative) H&P that is linked to this encounter, and examined the patient. There are no significant changes    Clinical Conditions Present on Arrival:  Clinically Significant Risk Factors Present on Admission           # Hyponatremia: Lowest Na = 135 mmol/L in last 30 days, will monitor as appropriate          # Obesity: Estimated body mass index is 35.44 kg/m  as calculated from the following:    Height as of 1/26/23: 1.753 m (5' 9\").    Weight as of 1/26/23: 108.9 kg (240 lb).       "

## 2023-02-02 NOTE — ANESTHESIA CARE TRANSFER NOTE
Patient: Yaya Mg    Procedure: Procedure(s):  COLONOSCOPY with polypectomy       Diagnosis: Encounter for screening colonoscopy [Z12.11]  Diagnosis Additional Information: No value filed.    Anesthesia Type:   MAC     Note:    Oropharynx: oropharynx clear of all foreign objects and spontaneously breathing  Level of Consciousness: awake  Oxygen Supplementation: room air    Independent Airway: airway patency satisfactory and stable  Dentition: dentition unchanged  Vital Signs Stable: post-procedure vital signs reviewed and stable  Report to RN Given: handoff report given  Patient transferred to: Phase II    Handoff Report: Identifed the Patient, Identified the Reponsible Provider, Reviewed the pertinent medical history, Discussed the surgical course, Reviewed Intra-OP anesthesia mangement and issues during anesthesia, Set expectations for post-procedure period and Allowed opportunity for questions and acknowledgement of understanding      Vitals:  Vitals Value Taken Time   BP     Temp     Pulse     Resp     SpO2         Electronically Signed By: BREEZY MAHAN  February 2, 2023  7:51 AM

## 2023-02-02 NOTE — ANESTHESIA POSTPROCEDURE EVALUATION
Patient: Yaya Mg    Procedure: Procedure(s):  COLONOSCOPY with polypectomy       Anesthesia Type:  MAC    Note:  Disposition: Outpatient   Postop Pain Control: Uneventful            Sign Out: Well controlled pain   PONV: No   Neuro/Psych: Uneventful            Sign Out: Acceptable/Baseline neuro status   Airway/Respiratory: Uneventful            Sign Out: Acceptable/Baseline resp. status   CV/Hemodynamics: Uneventful            Sign Out: Acceptable CV status; No obvious hypovolemia; No obvious fluid overload   Other NRE: NONE   DID A NON-ROUTINE EVENT OCCUR? No           Last vitals:  Vitals Value Taken Time   /86 02/02/23 0830   Temp 97.2  F (36.2  C) 02/02/23 0805   Pulse 80 02/02/23 0830   Resp 16 02/02/23 0815   SpO2 96 % 02/02/23 0831   Vitals shown include unvalidated device data.    Electronically Signed By: THOMAS Hall CRNA  February 2, 2023  8:33 AM

## 2023-02-02 NOTE — ANESTHESIA POSTPROCEDURE EVALUATION
Patient: Yaya Mg    Procedure: Procedure(s):  COLONOSCOPY with polypectomy       Anesthesia Type:  MAC    Note:  Disposition: Outpatient   Postop Pain Control: Uneventful            Sign Out: Well controlled pain   PONV: No   Neuro/Psych: Uneventful            Sign Out: Acceptable/Baseline neuro status   Airway/Respiratory: Uneventful            Sign Out: Acceptable/Baseline resp. status   CV/Hemodynamics: Uneventful            Sign Out: Acceptable CV status; No obvious hypovolemia; No obvious fluid overload   Other NRE: NONE   DID A NON-ROUTINE EVENT OCCUR? No           Last vitals:  Vitals Value Taken Time   /86 02/02/23 0830   Temp 97.2  F (36.2  C) 02/02/23 0805   Pulse 80 02/02/23 0830   Resp 16 02/02/23 0815   SpO2 96 % 02/02/23 0831   Vitals shown include unvalidated device data.    Electronically Signed By: THOMAS Kerr CRNA  February 2, 2023  8:52 AM

## 2023-02-02 NOTE — OR NURSING
Patient and responsible adult given discharge instructions with no questions regarding instructions. Nikita score 18/18. Pain level 0/10.

## 2023-02-02 NOTE — OP NOTE
REPORT OF OPERATION  DATE OF PROCEDURE: 2/2/2023    PATIENT: Yaya [facility]RAUJO [facility]    SURGERY PERFORMED:   Colonoscopy     with biopsy    with use of cauterized snare    without tattooing    PREOPERATIVE DIAGNOSIS: Positive ColoGuard    POSTOPERATIVE DIAGNOSIS:    Same   Colon polyps, Cecum and 25 and 20 cm   Diverticulosis was identified.   Hemorrhoids  were  identified.    SURGEON: Gildardo Nichols MD    ASSISTANTS: None    ANESTHESIA: Monitored Anesthesia Care    COMPLICATIONS: None apparent    TRANSFUSIONS: None     TISSUE TO PATHOLOGY: Polyps from Cecum and 25 and 20 cm were sent to pathology for pathological diagnosis.    FINDINGS: Colon polyps, Cecum and 25 and 20 cm.  Diverticulosis was identified.  Hemorrhoids  were  identified.    INDICATIONS: This is a 47 year old male in need of a colonoscopy for Positive ColoGuard.  The patient will be taken to the endoscopy suite for that procedure.    DESCRIPTIONS OF PROCEDURE IN DETAIL: After consent was obtained the patient was taken to the endoscopy suite and placed in the left lateral decubitus position.  The patient was identified and the correct patient was confirmed.  Monitored Anesthesia Care was given.  A time out was performed verifying the correct patient and the correct procedure.  The entire operative team was in agreement.  All necessary equipment and supplies were in the room.    Rectal exam was performed and no lesions of the anal canal were noted.  The colonoscope was inserted into the anus and passed without difficulty to the cecum.  The cecum was identified by the ileocecal valve, the coalescence of the tinea and the appendiceal orifice.  Upon withdrawal all walls of the colon were visualized.  Polyps were identified at Cecum and 25 and 20 cm.  These were removed by snare.  Tattooing their of the location was not done.  Large colon masses and colitis were not seen.colon  Diverticulosis was seen.  Upon reaching the rectum the scope was retroflexed  and internal hemorrhoids  were  seen.  The scope was straightened back out and removed from the patient.  The patient was then taken to the recovery room in stable condition tolerating the procedure well.      Prep: fair    Withdrawal time was 10 minutes.    It is recommended that the patient have another colonoscopy in 2 years.

## 2023-02-02 NOTE — DISCHARGE INSTRUCTIONS

## 2023-02-04 DIAGNOSIS — E10.9 TYPE 1 DIABETES MELLITUS WITHOUT COMPLICATION (H): ICD-10-CM

## 2023-02-06 LAB
PATH REPORT.COMMENTS IMP SPEC: NORMAL
PATH REPORT.FINAL DX SPEC: NORMAL
PATH REPORT.GROSS SPEC: NORMAL
PATH REPORT.MICROSCOPIC SPEC OTHER STN: NORMAL
PATH REPORT.RELEVANT HX SPEC: NORMAL
PHOTO IMAGE: NORMAL

## 2023-02-06 RX ORDER — LISINOPRIL 5 MG/1
TABLET ORAL
Qty: 90 TABLET | Refills: 3 | Status: SHIPPED | OUTPATIENT
Start: 2023-02-06

## 2023-02-09 ENCOUNTER — OFFICE VISIT (OUTPATIENT)
Dept: UROLOGY | Facility: CLINIC | Age: 48
End: 2023-02-09
Payer: COMMERCIAL

## 2023-02-09 VITALS
HEART RATE: 89 BPM | WEIGHT: 235 LBS | OXYGEN SATURATION: 100 % | BODY MASS INDEX: 34.8 KG/M2 | SYSTOLIC BLOOD PRESSURE: 151 MMHG | DIASTOLIC BLOOD PRESSURE: 83 MMHG | HEIGHT: 69 IN

## 2023-02-09 DIAGNOSIS — N52.1 ERECTILE DYSFUNCTION DUE TO DISEASES CLASSIFIED ELSEWHERE: ICD-10-CM

## 2023-02-09 DIAGNOSIS — E10.9 TYPE 1 DIABETES MELLITUS WITHOUT COMPLICATION (H): Primary | ICD-10-CM

## 2023-02-09 PROCEDURE — 99024 POSTOP FOLLOW-UP VISIT: CPT | Performed by: UROLOGY

## 2023-02-09 ASSESSMENT — PAIN SCALES - GENERAL: PAINLEVEL: NO PAIN (0)

## 2023-02-09 NOTE — PROGRESS NOTES
"CC: Yaya Mg  is post-op from IPP, Titan, done on 1/3/22.  HPI: Patient is 5 wks post-op.  He has been doing well. No fevers or chills. Pain resolved.     Exam: BP (!) 151/83 (BP Location: Right arm, Patient Position: Sitting, Cuff Size: Adult Large)   Pulse 89   Ht 1.753 m (5' 9\")   Wt 106.6 kg (235 lb)   SpO2 100%   BMI 34.70 kg/m   . NAD.   Incision healing well. No discharge, erythema or fluctuence suggestive of infection.   Penis is deflated well.  Tips symmetric in the mid glans , pump is easily palpable in the anterior scrotum.  No signs of infection.  He's able to demonstrate how to cycle the device up and down.      Assessment  Erectile dysfunction secondary to DM type 1.    Post-op from IPP placement    PLAN:   Instructed him to cycle the device for the next several days.  Keep deflated as much as possible.  OK to deflate if needed.  OK for intercourse in a week.  OK for return to work without restrictions.  Return to clinic MARY Tenorio MD    Visit within post-op global.       "

## 2023-02-09 NOTE — NURSING NOTE
"Chief Complaint   Patient presents with     Follow Up     IPP activation       Blood pressure (!) 151/83, pulse 89, height 1.753 m (5' 9\"), weight 106.6 kg (235 lb), SpO2 100 %. Body mass index is 34.7 kg/m .    Patient Active Problem List   Diagnosis     Erectile dysfunction due to diseases classified elsewhere     Type 1 diabetes mellitus without complication (H)     Depression with anxiety     Mixed hyperlipidemia     Elevated LFTs     Morbid obesity (H)     Umbilical hernia without obstruction and without gangrene     Burning sensation of feet     Cough variant asthma     FH: colon cancer in relative diagnosed at >50 years old       No Known Allergies    Current Outpatient Medications   Medication Sig Dispense Refill     lisinopril (ZESTRIL) 5 MG tablet TAKE 1 TABLET BY MOUTH EVERY DAY 90 tablet 3     albuterol (PROAIR HFA/PROVENTIL HFA/VENTOLIN HFA) 108 (90 Base) MCG/ACT inhaler Inhale 2 puffs into the lungs every 6 hours 18 g 4     atorvastatin (LIPITOR) 20 MG tablet Take 1 tablet (20 mg) by mouth daily 90 tablet 3     BD INSULIN SYRINGE ULTRAFINE 30G X 1/2\" 1 ML miscellaneous USE AS NEEDED 100 each 5     bisacodyl (DULCOLAX) 5 MG EC tablet Take 2 tabs at bedtime 2 nights prior to procedure. Take 2 tabs at 3pm the day before procedure. 4 tablet 0     Continuous Blood Gluc  (DEXCOM G6 ) JUSTEN        Continuous Blood Gluc Sensor (DEXCOM G6 SENSOR) MISC 1 each every 10 days Change every 10 days. 9 each 4     Continuous Blood Gluc Transmit (DEXCOM G6 TRANSMITTER) MISC 1 each every 3 months Change every 3 months. 1 each 4     escitalopram (LEXAPRO) 20 MG tablet Take 1 tablet (20 mg) by mouth daily 90 tablet 3     gabapentin (NEURONTIN) 300 MG capsule 1-2 capsules at night as needed 60 capsule 3     Insulin Infusion Pump (T: SLIM X2 INS /CONTROL 7.4) JUSTEN        Insulin Infusion Pump Supplies (AUTOSOFT 90 INFUSION SET) MISC Inject 1 each Subcutaneous every 48 hours 45 each 4     Insulin Infusion " Pump Supplies (T:SLIM X2 3ML CARTRIDGE) MISC Inject 1 each Subcutaneous every 48 hours 45 each 3     insulin lispro (HUMALOG) 100 UNIT/ML vial To be used with insulin pump total daily dose 150 units 50 mL 3     insulin pen needle (31G X 8 MM) 31G X 8 MM miscellaneous Use 3 pen needles daily or as directed. 100 each 11     multivitamin, therapeutic (THERA-VIT) TABS tablet Take 1 tablet by mouth daily       polyethylene glycol (GOLYTELY) 236 g suspension Drink 8 ounces every 10 minutes until the jug is half-empty at 6pm the night before procedure. Refrigerate. Repeat 6 hours prior to procedure. 4000 mL 0     polyethylene glycol-electrolytes (NULYTELY) 420 g solution        senna-docusate (SENOKOT-S/PERICOLACE) 8.6-50 MG tablet Take 1-2 tablets by mouth 2 times daily Take while on oral narcotics to prevent or treat constipation. 30 tablet 0     traZODone (DESYREL) 50 MG tablet 1/2-3 tablet orally at bedtime 30 tablet 3       Social History     Tobacco Use     Smoking status: Never     Smokeless tobacco: Never   Vaping Use     Vaping Use: Never used   Substance Use Topics     Alcohol use: Yes     Comment: socially     Drug use: Never       Fidencio Tomlinson, EMT  2/9/2023  11:48 AM

## 2023-03-11 ENCOUNTER — MYC MEDICAL ADVICE (OUTPATIENT)
Dept: FAMILY MEDICINE | Facility: OTHER | Age: 48
End: 2023-03-11

## 2023-03-11 DIAGNOSIS — E10.9 TYPE 1 DIABETES MELLITUS WITHOUT COMPLICATION (H): ICD-10-CM

## 2023-03-14 ENCOUNTER — MYC MEDICAL ADVICE (OUTPATIENT)
Dept: FAMILY MEDICINE | Facility: OTHER | Age: 48
End: 2023-03-14

## 2023-03-14 RX ORDER — PROCHLORPERAZINE 25 MG/1
1 SUPPOSITORY RECTAL
Qty: 1 EACH | Refills: 4 | Status: SHIPPED | OUTPATIENT
Start: 2023-03-14 | End: 2023-09-15

## 2023-03-14 ASSESSMENT — ASTHMA QUESTIONNAIRES
QUESTION_3 LAST FOUR WEEKS HOW OFTEN DID YOUR ASTHMA SYMPTOMS (WHEEZING, COUGHING, SHORTNESS OF BREATH, CHEST TIGHTNESS OR PAIN) WAKE YOU UP AT NIGHT OR EARLIER THAN USUAL IN THE MORNING: NOT AT ALL
QUESTION_4 LAST FOUR WEEKS HOW OFTEN HAVE YOU USED YOUR RESCUE INHALER OR NEBULIZER MEDICATION (SUCH AS ALBUTEROL): NOT AT ALL
ACT_TOTALSCORE: 20
QUESTION_5 LAST FOUR WEEKS HOW WOULD YOU RATE YOUR ASTHMA CONTROL: SOMEWHAT CONTROLLED
QUESTION_1 LAST FOUR WEEKS HOW MUCH OF THE TIME DID YOUR ASTHMA KEEP YOU FROM GETTING AS MUCH DONE AT WORK, SCHOOL OR AT HOME: A LITTLE OF THE TIME
QUESTION_2 LAST FOUR WEEKS HOW OFTEN HAVE YOU HAD SHORTNESS OF BREATH: THREE TO SIX TIMES A WEEK
ACT_TOTALSCORE: 20

## 2023-03-15 NOTE — TELEPHONE ENCOUNTER
Patient completed MyChart ACT per [facility] quality measure patient outreach. This writer notes no concerns at present with patient currently in [facility] Range >/=20.     ACT Total Scores 12/29/2022 3/14/2023   ACT TOTAL SCORE (Goal Greater than or Equal to 20) 19 20   In the past 12 months, how many times did you visit the emergency room for your asthma without being admitted to the hospital? 0 0   In the past 12 months, how many times were you hospitalized overnight because of your asthma? 0 0     Radha Su RN

## 2023-03-16 NOTE — ANESTHESIA POSTPROCEDURE EVALUATION
Patient: Yaya Mg    Procedure: Procedure(s):  INSERTION of INFLATABLE PENILE PROSTHESIS       Anesthesia Type:  General    Note:  Disposition: Inpatient   Postop Pain Control: Uneventful            Sign Out: Well controlled pain   PONV: No   Neuro/Psych: Uneventful            Sign Out: Acceptable/Baseline neuro status   Airway/Respiratory: Uneventful            Sign Out: Acceptable/Baseline resp. status   CV/Hemodynamics: Uneventful            Sign Out: Acceptable CV status; No obvious hypovolemia; No obvious fluid overload   Other NRE:    DID A NON-ROUTINE EVENT OCCUR?            Last vitals:  Vitals Value Taken Time   /95 01/03/23 1530   Temp 36.7  C (98.1  F) 01/03/23 1530   Pulse 78 01/03/23 1530   Resp 11 01/03/23 1530   SpO2 99 % 01/03/23 1530       Electronically Signed By: Mayra Bowen MD  March 16, 2023  11:08 AM

## 2023-03-30 DIAGNOSIS — F41.8 DEPRESSION WITH ANXIETY: ICD-10-CM

## 2023-04-01 ENCOUNTER — MYC MEDICAL ADVICE (OUTPATIENT)
Dept: FAMILY MEDICINE | Facility: OTHER | Age: 48
End: 2023-04-01

## 2023-04-01 DIAGNOSIS — R20.8 BURNING SENSATION OF FEET: ICD-10-CM

## 2023-04-03 RX ORDER — ESCITALOPRAM OXALATE 20 MG/1
20 TABLET ORAL DAILY
Qty: 90 TABLET | Refills: 2 | Status: SHIPPED | OUTPATIENT
Start: 2023-04-03 | End: 2024-01-24

## 2023-04-03 RX ORDER — GABAPENTIN 300 MG/1
CAPSULE ORAL
Qty: 60 CAPSULE | Refills: 3 | Status: SHIPPED | OUTPATIENT
Start: 2023-04-03 | End: 2023-06-23

## 2023-04-03 NOTE — TELEPHONE ENCOUNTER
escitalopram (LEXAPRO) 20 MG tablet    Last Written Prescription Date:  1-26-23  Last Fill Quantity: 90,   # refills: 3  Last Office Visit:   Future Office visit:    Next 5 appointments (look out 90 days)    Apr 04, 2023 10:45 AM  (Arrive by 10:30 AM)  SHORT with Selwyn Ashraf MD  [facility] ([facility] ) [address]  [place] [address]  [phone]           Routing refill request to provider for review/approval because:  New pharmacy

## 2023-04-07 NOTE — PROGRESS NOTES
Assessment & Plan     Type 1 diabetes mellitus without complication (H)  Still good control but A1C up. Discussed. Overdue for eye exam. Follow-up in 4 months. No change in meds   - Adult Eye  Referral; Future  - Orthopedic  Referral; Future    Mixed hyperlipidemia  Stable on statin.     Morbid obesity (H)  Discussed. Keep wt down.     Cough variant asthma  Stable -  No meds needed     Pain in both feet  Discussed. Will refer for evaluation   - Orthopedic  Referral; Future                 No follow-ups on file.    Selwyn Ashraf MD  [facility] - SANJEEV Rios is a 47 year old, presenting for the following health issues:  Diabetes and Lipids         View : No data to display.              HPI     Diabetes Follow-up    How often are you checking your blood sugar? Continuous glucose monitor  What time of day are you checking your blood sugars (select all that apply)?  glucose monitor   Have you had any blood sugars above 200?  Yes sometimes after meals   Have you had any blood sugars below 70?  Yes sometimes     What symptoms do you notice when your blood sugar is low?  Shaky, Dizzy, Weak, Blurred vision, Confusion and sweaty     What concerns do you have today about your diabetes? None     Do you have any of these symptoms? (Select all that apply)  Numbness in feet and Burning in feet    Have you had a diabetic eye exam in the last 12 months? No    C/o bilateral foot pain   H/o neuromas      BP Readings from Last 2 Encounters:   04/12/23 125/88   02/09/23 (!) 151/83     Hemoglobin A1C (%)   Date Value   12/29/2022 6.4 (H)   10/04/2022 6.6 (H)     LDL Cholesterol Calculated (mg/dL)   Date Value   04/14/2022 57   02/17/2022 166 (H)         Hyperlipidemia Follow-Up      Are you regularly taking any medication or supplement to lower your cholesterol?   Yes- lipitor     Are you having muscle aches or other side effects that you think could be caused by your  "cholesterol lowering medication?  No          Review of Systems   Constitutional, HEENT, cardiovascular, pulmonary, gi and gu systems are negative, except as otherwise noted.      Objective    /88 (BP Location: Left arm, Patient Position: Sitting, Cuff Size: Adult Large)   Pulse 70   Temp (!) 96.5  F (35.8  C) (Tympanic)   Ht 1.753 m (5' 9\")   Wt 110.5 kg (243 lb 9.6 oz)   SpO2 97%   BMI 35.97 kg/m    Body mass index is 35.97 kg/m .  Physical Exam   GENERAL: healthy, alert and no distress  NECK: no adenopathy, no asymmetry, masses, or scars and thyroid normal to palpation  RESP: lungs clear to auscultation - no rales, rhonchi or wheezes  CV: regular rate and rhythm, normal S1 S2, no S3 or S4, no murmur, click or rub, no peripheral edema and peripheral pulses strong  ABDOMEN: soft, nontender, no hepatosplenomegaly, no masses and bowel sounds normal  MS: no gross musculoskeletal defects noted, no edema    Results for orders placed or performed in visit on 04/12/23   Comprehensive metabolic panel     Status: Abnormal   Result Value Ref Range    Sodium 136 136 - 145 mmol/L    Potassium 4.1 3.4 - 5.3 mmol/L    Chloride 99 98 - 107 mmol/L    Carbon Dioxide (CO2) 27 22 - 29 mmol/L    Anion Gap 10 7 - 15 mmol/L    Urea Nitrogen 14.8 6.0 - 20.0 mg/dL    Creatinine 0.90 0.67 - 1.17 mg/dL    Calcium 10.0 8.6 - 10.0 mg/dL    Glucose 95 70 - 99 mg/dL    Alkaline Phosphatase 165 (H) 40 - 129 U/L    AST 26 10 - 50 U/L    ALT 40 10 - 50 U/L    Protein Total 7.3 6.4 - 8.3 g/dL    Albumin 4.4 3.5 - 5.2 g/dL    Bilirubin Total 0.8 <=1.2 mg/dL    GFR Estimate >90 >60 mL/min/1.73m2   Hemoglobin A1c     Status: Abnormal   Result Value Ref Range    Estimated Average Glucose 157 mg/dL    Hemoglobin A1C 7.1 (H) <5.7 %   Lipid Profile     Status: Abnormal   Result Value Ref Range    Cholesterol 147 <200 mg/dL    Triglycerides 166 (H) <150 mg/dL    Direct Measure HDL 55 >=40 mg/dL    LDL Cholesterol Calculated 59 <=100 mg/dL    " Non HDL Cholesterol 92 <130 mg/dL    Narrative    Cholesterol  Desirable:  <200 mg/dL    Triglycerides  Normal:  Less than 150 mg/dL  Borderline High:  150-199 mg/dL  High:  200-499 mg/dL  Very High:  Greater than or equal to 500 mg/dL    Direct Measure HDL  Female:  Greater than or equal to 50 mg/dL   Male:  Greater than or equal to 40 mg/dL    LDL Cholesterol  Desirable:  <100mg/dL  Above Desirable:  100-129 mg/dL   Borderline High:  130-159 mg/dL   High:  160-189 mg/dL   Very High:  >= 190 mg/dL    Non HDL Cholesterol  Desirable:  130 mg/dL  Above Desirable:  130-159 mg/dL  Borderline High:  160-189 mg/dL  High:  190-219 mg/dL  Very High:  Greater than or equal to 220 mg/dL   CBC with platelets and differential     Status: None   Result Value Ref Range    WBC Count 5.3 4.0 - 11.0 10e3/uL    RBC Count 5.25 4.40 - 5.90 10e6/uL    Hemoglobin 15.4 13.3 - 17.7 g/dL    Hematocrit 42.2 40.0 - 53.0 %    MCV 80 78 - 100 fL    MCH 29.3 26.5 - 33.0 pg    MCHC 36.5 31.5 - 36.5 g/dL    RDW 13.0 10.0 - 15.0 %    Platelet Count 179 150 - 450 10e3/uL    % Neutrophils 55 %    % Lymphocytes 25 %    % Monocytes 14 %    % Eosinophils 5 %    % Basophils 1 %    % Immature Granulocytes 0 %    NRBCs per 100 WBC 0 <1 /100    Absolute Neutrophils 2.9 1.6 - 8.3 10e3/uL    Absolute Lymphocytes 1.3 0.8 - 5.3 10e3/uL    Absolute Monocytes 0.8 0.0 - 1.3 10e3/uL    Absolute Eosinophils 0.3 0.0 - 0.7 10e3/uL    Absolute Basophils 0.0 0.0 - 0.2 10e3/uL    Absolute Immature Granulocytes 0.0 <=0.4 10e3/uL    Absolute NRBCs 0.0 10e3/uL   CBC with Platelets & Differential     Status: None    Narrative    The following orders were created for panel order CBC with Platelets & Differential.  Procedure                               Abnormality         Status                     ---------                               -----------         ------                     CBC with platelets and d...[[id number]]                      Final result                  Please view results for these tests on the individual orders.

## 2023-04-10 ENCOUNTER — MYC MEDICAL ADVICE (OUTPATIENT)
Dept: FAMILY MEDICINE | Facility: OTHER | Age: 48
End: 2023-04-10

## 2023-04-12 ENCOUNTER — OFFICE VISIT (OUTPATIENT)
Dept: FAMILY MEDICINE | Facility: OTHER | Age: 48
End: 2023-04-12
Attending: FAMILY MEDICINE
Payer: COMMERCIAL

## 2023-04-12 ENCOUNTER — LAB (OUTPATIENT)
Dept: LAB | Facility: OTHER | Age: 48
End: 2023-04-12
Attending: FAMILY MEDICINE
Payer: COMMERCIAL

## 2023-04-12 VITALS
OXYGEN SATURATION: 97 % | TEMPERATURE: 96.5 F | HEART RATE: 70 BPM | BODY MASS INDEX: 36.08 KG/M2 | DIASTOLIC BLOOD PRESSURE: 88 MMHG | SYSTOLIC BLOOD PRESSURE: 125 MMHG | HEIGHT: 69 IN | WEIGHT: 243.6 LBS

## 2023-04-12 DIAGNOSIS — E78.2 MIXED HYPERLIPIDEMIA: ICD-10-CM

## 2023-04-12 DIAGNOSIS — E66.01 MORBID OBESITY (H): ICD-10-CM

## 2023-04-12 DIAGNOSIS — M79.671 PAIN IN BOTH FEET: ICD-10-CM

## 2023-04-12 DIAGNOSIS — M79.672 PAIN IN BOTH FEET: ICD-10-CM

## 2023-04-12 DIAGNOSIS — J45.991 COUGH VARIANT ASTHMA: ICD-10-CM

## 2023-04-12 DIAGNOSIS — E10.9 TYPE 1 DIABETES MELLITUS WITHOUT COMPLICATION (H): Primary | ICD-10-CM

## 2023-04-12 LAB
ALBUMIN SERPL BCG-MCNC: 4.4 G/DL (ref 3.5–5.2)
ALP SERPL-CCNC: 165 U/L (ref 40–129)
ALT SERPL W P-5'-P-CCNC: 40 U/L (ref 10–50)
ANION GAP SERPL CALCULATED.3IONS-SCNC: 10 MMOL/L (ref 7–15)
AST SERPL W P-5'-P-CCNC: 26 U/L (ref 10–50)
BASOPHILS # BLD AUTO: 0 10E3/UL (ref 0–0.2)
BASOPHILS NFR BLD AUTO: 1 %
BILIRUB SERPL-MCNC: 0.8 MG/DL
BUN SERPL-MCNC: 14.8 MG/DL (ref 6–20)
CALCIUM SERPL-MCNC: 10 MG/DL (ref 8.6–10)
CHLORIDE SERPL-SCNC: 99 MMOL/L (ref 98–107)
CHOLEST SERPL-MCNC: 147 MG/DL
CREAT SERPL-MCNC: 0.9 MG/DL (ref 0.67–1.17)
DEPRECATED HCO3 PLAS-SCNC: 27 MMOL/L (ref 22–29)
EOSINOPHIL # BLD AUTO: 0.3 10E3/UL (ref 0–0.7)
EOSINOPHIL NFR BLD AUTO: 5 %
ERYTHROCYTE [DISTWIDTH] IN BLOOD BY AUTOMATED COUNT: 13 % (ref 10–15)
EST. AVERAGE GLUCOSE BLD GHB EST-MCNC: 157 MG/DL
GFR SERPL CREATININE-BSD FRML MDRD: >90 ML/MIN/1.73M2
GLUCOSE SERPL-MCNC: 95 MG/DL (ref 70–99)
HBA1C MFR BLD: 7.1 %
HCT VFR BLD AUTO: 42.2 % (ref 40–53)
HDLC SERPL-MCNC: 55 MG/DL
HGB BLD-MCNC: 15.4 G/DL (ref 13.3–17.7)
IMM GRANULOCYTES # BLD: 0 10E3/UL
IMM GRANULOCYTES NFR BLD: 0 %
LDLC SERPL CALC-MCNC: 59 MG/DL
LYMPHOCYTES # BLD AUTO: 1.3 10E3/UL (ref 0.8–5.3)
LYMPHOCYTES NFR BLD AUTO: 25 %
MCH RBC QN AUTO: 29.3 PG (ref 26.5–33)
MCHC RBC AUTO-ENTMCNC: 36.5 G/DL (ref 31.5–36.5)
MCV RBC AUTO: 80 FL (ref 78–100)
MONOCYTES # BLD AUTO: 0.8 10E3/UL (ref 0–1.3)
MONOCYTES NFR BLD AUTO: 14 %
NEUTROPHILS # BLD AUTO: 2.9 10E3/UL (ref 1.6–8.3)
NEUTROPHILS NFR BLD AUTO: 55 %
NONHDLC SERPL-MCNC: 92 MG/DL
NRBC # BLD AUTO: 0 10E3/UL
NRBC BLD AUTO-RTO: 0 /100
PLATELET # BLD AUTO: 179 10E3/UL (ref 150–450)
POTASSIUM SERPL-SCNC: 4.1 MMOL/L (ref 3.4–5.3)
PROT SERPL-MCNC: 7.3 G/DL (ref 6.4–8.3)
RBC # BLD AUTO: 5.25 10E6/UL (ref 4.4–5.9)
SODIUM SERPL-SCNC: 136 MMOL/L (ref 136–145)
TRIGL SERPL-MCNC: 166 MG/DL
TSH SERPL DL<=0.005 MIU/L-ACNC: 1.83 UIU/ML (ref 0.3–4.2)
WBC # BLD AUTO: 5.3 10E3/UL (ref 4–11)

## 2023-04-12 PROCEDURE — 80050 GENERAL HEALTH PANEL: CPT

## 2023-04-12 PROCEDURE — 80061 LIPID PANEL: CPT

## 2023-04-12 PROCEDURE — 36415 COLL VENOUS BLD VENIPUNCTURE: CPT

## 2023-04-12 PROCEDURE — 83036 HEMOGLOBIN GLYCOSYLATED A1C: CPT

## 2023-04-12 PROCEDURE — 99214 OFFICE O/P EST MOD 30 MIN: CPT | Performed by: FAMILY MEDICINE

## 2023-04-12 ASSESSMENT — PAIN SCALES - GENERAL: PAINLEVEL: NO PAIN (0)

## 2023-04-20 ENCOUNTER — TRANSFERRED RECORDS (OUTPATIENT)
Dept: HEALTH INFORMATION MANAGEMENT | Facility: CLINIC | Age: 48
End: 2023-04-20
Payer: COMMERCIAL

## 2023-04-20 LAB — RETINOPATHY: POSITIVE

## 2023-04-27 ENCOUNTER — OFFICE VISIT (OUTPATIENT)
Dept: FAMILY MEDICINE | Facility: OTHER | Age: 48
End: 2023-04-27
Attending: FAMILY MEDICINE
Payer: COMMERCIAL

## 2023-04-27 VITALS
SYSTOLIC BLOOD PRESSURE: 116 MMHG | RESPIRATION RATE: 18 BRPM | BODY MASS INDEX: 36.18 KG/M2 | HEART RATE: 77 BPM | WEIGHT: 244.3 LBS | DIASTOLIC BLOOD PRESSURE: 82 MMHG | HEIGHT: 69 IN | TEMPERATURE: 96.3 F | OXYGEN SATURATION: 98 %

## 2023-04-27 DIAGNOSIS — Z01.818 PREOPERATIVE EXAMINATION: Primary | ICD-10-CM

## 2023-04-27 DIAGNOSIS — E10.9 TYPE 1 DIABETES MELLITUS WITHOUT COMPLICATION (H): ICD-10-CM

## 2023-04-27 DIAGNOSIS — H26.9 CATARACT OF LEFT EYE, UNSPECIFIED CATARACT TYPE: ICD-10-CM

## 2023-04-27 DIAGNOSIS — E66.01 MORBID OBESITY (H): ICD-10-CM

## 2023-04-27 DIAGNOSIS — E78.2 MIXED HYPERLIPIDEMIA: ICD-10-CM

## 2023-04-27 PROCEDURE — 99214 OFFICE O/P EST MOD 30 MIN: CPT | Performed by: FAMILY MEDICINE

## 2023-04-27 ASSESSMENT — ENCOUNTER SYMPTOMS
LIGHT-HEADEDNESS: 0
FEVER: 0
PALPITATIONS: 0
SHORTNESS OF BREATH: 0
ABDOMINAL PAIN: 0

## 2023-04-27 ASSESSMENT — PAIN SCALES - GENERAL: PAINLEVEL: NO PAIN (0)

## 2023-04-27 NOTE — PROGRESS NOTES
[facility] [place]  [address]  [place] [address]  Phone: [phone]  Primary Provider: Selwyn Ashraf  Pre-op Performing Provider: SUSANNA SHEA      PREOPERATIVE EVALUATION:  Today's date: 4/27/2023    Yaya Mg is a 48 year old male who presents for a preoperative evaluation.      4/12/2023     8:14 AM   Additional Questions   Roomed by Janki KERNS   Accompanied by self     Surgical Information:  Surgery/Procedure: Cataract Extraction Left eye  Surgery Location: [facility]  Surgeon: Hugo Ramirez MD  Surgery Date: 05/02/2023  Time of Surgery: TBD  Where patient plans to recover: At home with family  Fax number for surgical facility: Note does not need to be faxed, will be available electronically in Epic.    Assessment & Plan     The proposed surgical procedure is considered LOW risk.    Preoperative examination  Cataract of left eye, unspecified cataract type  Type 1 diabetes mellitus without complication (H)  Mixed hyperlipidemia  Morbid obesity (H)       - No identified additional risk factors other than previously addressed        RECOMMENDATION:  APPROVAL GIVEN to proceed with proposed procedure, without further diagnostic evaluation.    Subjective     HPI related to upcoming procedure: preop cataract left eye          4/27/2023    10:17 AM   Preop Questions   1. Have you ever had a heart attack or stroke? No   2. Have you ever had surgery on your heart or blood vessels, such as a stent placement, a coronary artery bypass, or surgery on an artery in your head, neck, heart, or legs? No   3. Do you have chest pain with activity? No   4. Do you have a history of  heart failure? No   5. Do you currently have a cold, bronchitis or symptoms of other infection? No   6. Do you have a cough, shortness of breath, or wheezing? No   7. Do you or anyone in your family have previous history of blood clots? No   8. Do you or does anyone in your family have a serious bleeding problem such  as prolonged bleeding following surgeries or cuts? No   9. Have you ever had problems with anemia or been told to take iron pills? No   10. Have you had any abnormal blood loss such as black, tarry or bloody stools? No   11. Have you ever had a blood transfusion? No   12. Are you willing to have a blood transfusion if it is medically needed before, during, or after your surgery? Yes   13. Have you or any of your relatives ever had problems with anesthesia? No   14. Do you have sleep apnea, excessive snoring or daytime drowsiness? Snores some nights   15. Do you have any artifical heart valves or other implanted medical devices like a pacemaker, defibrillator, or continuous glucose monitor? YES -    15a. What type of device do you have? continuos glucose monitor   15b. Name of the clinic that manages your device:  [facility]   16. Do you have artificial joints? No   17. Are you allergic to latex? No     Health Care Directive:  Patient does not have a Health Care Directive or Living Will: Discussed advance care planning with patient; however, patient declined at this time.    Preoperative Review of :   reviewed - gabapentin      Status of Chronic Conditions:  Type 1 Diabetes, diagnosed approx age 25, on insulin pump, last A1c 7.1 on 4/12/23.  Diabetic Neuropathy on gabapentin  Hypertension  Dyslipidemia  Depression/Anxiety  BMI 36    Review of Systems   Constitutional: Negative for fever.   Respiratory: Negative for shortness of breath.    Cardiovascular: Negative for chest pain, palpitations and peripheral edema.   Gastrointestinal: Negative for abdominal pain.   Neurological: Negative for light-headedness.         Patient Active Problem List    Diagnosis Date Noted     Morbid obesity (H) 11/10/2022     Priority: Medium     Umbilical hernia without obstruction and without gangrene 11/10/2022     Priority: Medium     Burning sensation of feet 11/10/2022     Priority: Medium     Cough variant asthma 11/10/2022      Priority: Medium     FH: colon cancer in relative diagnosed at >50 years old 11/10/2022     Priority: Medium     Erectile dysfunction due to diseases classified elsewhere 12/15/2021     Priority: Medium     Type 1 diabetes mellitus without complication (H) 12/15/2021     Priority: Medium     Depression with anxiety 12/15/2021     Priority: Medium     Mixed hyperlipidemia 12/15/2021     Priority: Medium     Elevated LFTs 12/15/2021     Priority: Medium      Past Medical History:   Diagnosis Date     Anxiety      Depressive disorder      Hypertension      Type 1 diabetes (H)      Past Surgical History:   Procedure Laterality Date     APPENDECTOMY  01/1985     BACK SURGERY  2010    lumbar calcium deposits removed     CATARACT EXTRACTION Right 2021     COLONOSCOPY N/A 2/2/2023    Procedure: COLONOSCOPY with polypectomy;  Surgeon: iGldardo Nichols MD;  Location: [facility] OR     IMPLANT PROSTHESIS PENIS INFLATABLE N/A 1/3/2023    Procedure: INSERTION of INFLATABLE PENILE PROSTHESIS;  Surgeon: Amandeep Hill MD;  Location: [facility] OR     Current Outpatient Medications   Medication Sig Dispense Refill     atorvastatin (LIPITOR) 20 MG tablet Take 1 tablet (20 mg) by mouth daily 90 tablet 3     Continuous Blood Gluc  (DEXCOM G6 ) JUSTEN        Continuous Blood Gluc Sensor (DEXCOM G6 SENSOR) MISC 1 each every 10 days Change every 10 days. 9 each 4     Continuous Blood Gluc Transmit (DEXCOM G6 TRANSMITTER) MISC 1 each every 3 months Change every 3 months. 1 each 4     escitalopram (LEXAPRO) 20 MG tablet Take 1 tablet (20 mg) by mouth daily 90 tablet 2     gabapentin (NEURONTIN) 300 MG capsule 1-2 capsules at night as needed 60 capsule 3     Insulin Infusion Pump (T: SLIM X2 INS /CONTROL 7.4) JUSTEN        Insulin Infusion Pump Supplies (AUTOSOFT 90 INFUSION SET) MISC Inject 1 each Subcutaneous every 48 hours 45 each 4     Insulin Infusion Pump Supplies (T:SLIM X2 3ML CARTRIDGE) MISC Inject 1 each Subcutaneous  "every 48 hours 45 each 3     insulin lispro (HUMALOG) 100 UNIT/ML vial To be used with insulin pump total daily dose 150 units 50 mL 3     lisinopril (ZESTRIL) 5 MG tablet TAKE 1 TABLET BY MOUTH EVERY DAY 90 tablet 3     multivitamin, therapeutic (THERA-VIT) TABS tablet Take 1 tablet by mouth daily       traZODone (DESYREL) 50 MG tablet 1/2-3 tablet orally at bedtime (Patient not taking: Reported on 4/27/2023) 30 tablet 3       No Known Allergies     Social History     Tobacco Use     Smoking status: Never     Smokeless tobacco: Never   Vaping Use     Vaping status: Never Used   Substance Use Topics     Alcohol use: Yes     Comment: socially       History   Drug Use Unknown         Objective     /82   Pulse 77   Temp (!) 96.3  F (35.7  C) (Tympanic)   Resp 18   Ht 1.753 m (5' 9\")   Wt 110.8 kg (244 lb 4.8 oz)   SpO2 98%   BMI 36.08 kg/m      Physical Exam  Constitutional:       General: He is not in acute distress.     Appearance: Normal appearance.   HENT:      Head: Normocephalic and atraumatic.      Right Ear: Tympanic membrane normal.      Left Ear: Tympanic membrane normal.      Mouth/Throat:      Mouth: Mucous membranes are moist.      Pharynx: Oropharynx is clear.   Eyes:      Conjunctiva/sclera: Conjunctivae normal.      Pupils: Pupils are equal, round, and reactive to light.   Neck:      Vascular: No carotid bruit.   Cardiovascular:      Rate and Rhythm: Normal rate and regular rhythm.      Heart sounds: Normal heart sounds. No murmur heard.  Pulmonary:      Effort: Pulmonary effort is normal.      Breath sounds: Normal breath sounds. No wheezing.   Musculoskeletal:      Cervical back: Normal range of motion.      Right lower leg: No edema.      Left lower leg: No edema.   Lymphadenopathy:      Cervical: No cervical adenopathy.   Neurological:      Mental Status: He is alert and oriented to person, place, and time.           Recent Labs   Lab Test 04/12/23  0811 01/26/23  0856 01/04/23  0832 " 12/29/22  0914   HGB 15.4 14.7   < > 14.9    228   < > 202    138   < > 135*   POTASSIUM 4.1 4.3   < > 4.0   CR 0.90 0.85   < > 0.90   A1C 7.1*  --   --  6.4*    < > = values in this interval not displayed.        Diagnostics:  Labs from 4/12/23 reviewed and acceptable for surgery.  CBC, CMP, A1c, TSH, Lipids.    No EKG required for low risk surgery (cataract, skin procedure, breast biopsy, etc).           Signed Electronically by: SUSANNA SHEA DO  Copy of this evaluation report is provided to requesting physician.

## 2023-05-01 ENCOUNTER — TELEPHONE (OUTPATIENT)
Dept: FAMILY MEDICINE | Facility: OTHER | Age: 48
End: 2023-05-01

## 2023-05-26 NOTE — CARE PLAN
Patient A&O x4 and able to make needs known using call light. VSS and lung sounds clear and equal bilaterally. Bowel sounds active and passing gas. Denies chest pain, lightheadedness, dizziness, and SOB. IV's removed. Insulin drip discontinued at 0945 and home insulin pump initiated at 0915 per orders. Drinking well and tolerating regular diet. Voiding spontaneously without difficulties; PVR 21 after breaux removal. Able to wiggle toesq and CMS intact; denies numbness or tingling. Pain in incision and taking PRN Oxycodone with relief. Dressing CDI; given extra dressing supplies for discharge. Incentive spirometer encouraged and done several times. Up independently. Wife at bedside and attentive to care. Discharge instructions and follow up appoinment reviewed with patient and wife who verbalized understanding. All belongings sent with patient. Medications filled via retail pharmacy and given to patient. Plan is for patient to stay in [facility] and travel home tomorrow to home due to weather. Will follow up in 2 weeks at urology clinic in [place].   S/p dual-chamber Medtronic pacer implanted for symptomatic SSS/junctional rhythm with dizziness  -Follow-up in the [facility] pacer clinic in 1 to 2 weeks after discharge.    -Follow-up with  in 6 months.    -No MRI for 6 weeks post pacemaker.    -Cardiology following

## 2023-05-29 ENCOUNTER — MYC MEDICAL ADVICE (OUTPATIENT)
Dept: FAMILY MEDICINE | Facility: OTHER | Age: 48
End: 2023-05-29

## 2023-05-29 DIAGNOSIS — E10.9 TYPE 1 DIABETES MELLITUS WITHOUT COMPLICATION (H): Primary | ICD-10-CM

## 2023-06-01 RX ORDER — INSULIN ASPART 100 [IU]/ML
INJECTION, SOLUTION INTRAVENOUS; SUBCUTANEOUS
Qty: 50 ML | Refills: 4 | Status: SHIPPED | OUTPATIENT
Start: 2023-06-01 | End: 2023-12-11

## 2023-06-03 ENCOUNTER — MYC MEDICAL ADVICE (OUTPATIENT)
Dept: FAMILY MEDICINE | Facility: OTHER | Age: 48
End: 2023-06-03

## 2023-06-03 DIAGNOSIS — E10.9 TYPE 1 DIABETES MELLITUS WITHOUT COMPLICATION (H): ICD-10-CM

## 2023-06-05 RX ORDER — INSULIN PUMP CARTRIDGE
1 CARTRIDGE (EA) SUBCUTANEOUS
Qty: 45 EACH | Refills: 3 | Status: SHIPPED | OUTPATIENT
Start: 2023-06-05 | End: 2023-06-12

## 2023-06-05 RX ORDER — INFUSION SET FOR INSULIN PUMP
1 INFUSION SETS-PARAPHERNALIA MISCELLANEOUS
Qty: 45 EACH | Refills: 4 | Status: SHIPPED | OUTPATIENT
Start: 2023-06-05 | End: 2023-06-12

## 2023-06-05 RX ORDER — INSULIN LISPRO 100 [IU]/ML
INJECTION, SOLUTION INTRAVENOUS; SUBCUTANEOUS
Qty: 50 ML | Refills: 3 | Status: SHIPPED | OUTPATIENT
Start: 2023-06-05 | End: 2023-11-08

## 2023-06-05 NOTE — TELEPHONE ENCOUNTER
autosoft infusion set      Last Written Prescription Date:  3/29/2022  Last Fill Quantity: 45,   # refills: 4  Last Office Visit: 4/27/2023  Future Office visit:    Next 5 appointments (look out 90 days)    Aug 14, 2023 10:15 AM  (Arrive by 10:00 AM)  [facility] [address] [place] [address]  [phone]           Routing refill request to provider for review/approval because:    T:slim X2 3ML      Last Written Prescription Date:  3/29/2022  Last Fill Quantity: 45,   # refills: 3  Last Office Visit: 4/27/2023  Future Office visit:    Next 5 appointments (look out 90 days)    Aug 14, 2023 10:15 AM  (Arrive by 10:00 AM)  SHORT with Selwyn Ashraf MD  [facility] [place] ([facility] [place] ) [address]  [place] [address]  [phone]           Routing refill request to provider for review/approval because:

## 2023-06-12 RX ORDER — INFUSION SET FOR INSULIN PUMP
1 INFUSION SETS-PARAPHERNALIA MISCELLANEOUS
Qty: 45 EACH | Refills: 4 | Status: SHIPPED | OUTPATIENT
Start: 2023-06-12 | End: 2024-08-07

## 2023-06-12 RX ORDER — INSULIN PUMP CARTRIDGE
1 CARTRIDGE (EA) SUBCUTANEOUS
Qty: 45 EACH | Refills: 3 | Status: SHIPPED | OUTPATIENT
Start: 2023-06-12 | End: 2024-06-05

## 2023-06-14 ENCOUNTER — HOSPITAL ENCOUNTER (EMERGENCY)
Facility: HOSPITAL | Age: 48
Discharge: HOME | End: 2023-06-14
Attending: NURSE PRACTITIONER | Admitting: NURSE PRACTITIONER
Payer: COMMERCIAL

## 2023-06-14 VITALS
DIASTOLIC BLOOD PRESSURE: 89 MMHG | HEART RATE: 79 BPM | TEMPERATURE: 97 F | RESPIRATION RATE: 16 BRPM | SYSTOLIC BLOOD PRESSURE: 131 MMHG | OXYGEN SATURATION: 97 %

## 2023-06-14 DIAGNOSIS — J02.9 VIRAL PHARYNGITIS: ICD-10-CM

## 2023-06-14 LAB — GROUP A STREP BY PCR: NOT DETECTED

## 2023-06-14 PROCEDURE — 99213 OFFICE O/P EST LOW 20 MIN: CPT | Performed by: NURSE PRACTITIONER

## 2023-06-14 PROCEDURE — 87651 STREP A DNA AMP PROBE: CPT | Performed by: NURSE PRACTITIONER

## 2023-06-14 PROCEDURE — G0463 HOSPITAL OUTPT CLINIC VISIT: HCPCS

## 2023-06-14 RX ORDER — CETIRIZINE HYDROCHLORIDE 10 MG/1
10 TABLET ORAL 2 TIMES DAILY PRN
Qty: 20 TABLET | Refills: 0 | Status: SHIPPED | OUTPATIENT
Start: 2023-06-14 | End: 2023-06-24

## 2023-06-14 RX ORDER — FLUTICASONE PROPIONATE 50 MCG
1 SPRAY, SUSPENSION (ML) NASAL DAILY
Qty: 18.2 ML | Refills: 0 | Status: SHIPPED | OUTPATIENT
Start: 2023-06-14 | End: 2023-10-03

## 2023-06-14 ASSESSMENT — ENCOUNTER SYMPTOMS
CHILLS: 0
SORE THROAT: 1
SINUS PAIN: 0
ACTIVITY CHANGE: 1
FEVER: 0
APPETITE CHANGE: 0
SHORTNESS OF BREATH: 0
EYES NEGATIVE: 1
DIARRHEA: 0
HEADACHES: 0
COUGH: 0
RHINORRHEA: 0
FATIGUE: 0
NAUSEA: 0
SINUS PRESSURE: 0
MYALGIAS: 0
VOMITING: 0
TROUBLE SWALLOWING: 1

## 2023-06-14 NOTE — ED PROVIDER NOTES
History     Chief Complaint   Patient presents with     Pharyngitis     HPI  Yaya Mg is a 48 year old male who presents with a 5-day history of sore throat with painful swallowing and left ear pain.  Took acetaminophen 2 days ago that did help decrease his discomfort.  No known sick contacts.  Had second COVID vaccination 2021 and Tdap November, 2022.  Non-smoker.  Denies fevers, chills, nausea, vomiting, headaches, and shortness of breath.    Allergies:  No Known Allergies    Problem List:    Patient Active Problem List    Diagnosis Date Noted     Morbid obesity (H) 11/10/2022     Priority: Medium     Umbilical hernia without obstruction and without gangrene 11/10/2022     Priority: Medium     Burning sensation of feet 11/10/2022     Priority: Medium     Cough variant asthma 11/10/2022     Priority: Medium     FH: colon cancer in relative diagnosed at >50 years old 11/10/2022     Priority: Medium     Erectile dysfunction due to diseases classified elsewhere 12/15/2021     Priority: Medium     Type 1 diabetes mellitus without complication (H) 12/15/2021     Priority: Medium     Depression with anxiety 12/15/2021     Priority: Medium     Mixed hyperlipidemia 12/15/2021     Priority: Medium     Elevated LFTs 12/15/2021     Priority: Medium        Past Medical History:    Past Medical History:   Diagnosis Date     Anxiety      Depressive disorder      Hypertension      Type 1 diabetes (H)        Past Surgical History:    Past Surgical History:   Procedure Laterality Date     APPENDECTOMY  01/1985     BACK SURGERY  2010    lumbar calcium deposits removed     CATARACT EXTRACTION Right 2021     COLONOSCOPY N/A 2/2/2023    Procedure: COLONOSCOPY with polypectomy;  Surgeon: Gildardo Nichols MD;  Location: [facility]     IMPLANT PROSTHESIS PENIS INFLATABLE N/A 1/3/2023    Procedure: INSERTION of INFLATABLE PENILE PROSTHESIS;  Surgeon: Amandeep Hill MD;  Location: [facility]       Family History:    Family  History   Problem Relation Age of Onset     Hypertension Father      Hyperlipidemia Father      Obesity Father      Obesity Brother      Hypertension Brother      Depression Brother        Social History:  Marital Status:   [2]  Social History     Tobacco Use     Smoking status: Never     Smokeless tobacco: Never   Vaping Use     Vaping status: Never Used   Substance Use Topics     Alcohol use: Yes     Comment: socially     Drug use: Never        Medications:    cetirizine (ZYRTEC) 10 MG tablet  fluticasone (FLONASE) 50 MCG/ACT nasal spray  atorvastatin (LIPITOR) 20 MG tablet  Continuous Blood Gluc  (DEXCOM G6 ) JUSTEN  Continuous Blood Gluc Sensor (DEXCOM G6 SENSOR) MISC  Continuous Blood Gluc Transmit (DEXCOM G6 TRANSMITTER) MISC  escitalopram (LEXAPRO) 20 MG tablet  gabapentin (NEURONTIN) 300 MG capsule  insulin aspart (NOVOLOG VIAL) 100 UNITS/ML vial  Insulin Infusion Pump (T: SLIM X2 INS /CONTROL 7.4) JUSTEN  Insulin Infusion Pump Supplies (AUTOSOFT 90 INFUSION SET) MISC  Insulin Infusion Pump Supplies (T:SLIM X2 3ML CARTRIDGE) MISC  insulin lispro (HUMALOG) 100 UNIT/ML vial  lisinopril (ZESTRIL) 5 MG tablet  multivitamin, therapeutic (THERA-VIT) TABS tablet          Review of Systems   Constitutional: Positive for activity change. Negative for appetite change, chills, fatigue and fever.   HENT: Positive for ear pain (left), sore throat and trouble swallowing. Negative for rhinorrhea, sinus pressure and sinus pain.    Eyes: Negative.    Respiratory: Negative for cough and shortness of breath.    Gastrointestinal: Negative for diarrhea, nausea and vomiting.   Musculoskeletal: Negative for myalgias.   Skin: Negative.    Neurological: Negative for headaches.       Physical Exam   BP: 131/89  Pulse: 79  Temp: 97  F (36.1  C)  Resp: 16  SpO2: 97 %      Physical Exam  Vitals and nursing note reviewed.   Constitutional:       General: He is not in acute distress.     Appearance: He is overweight.    HENT:      Head: Normocephalic.      Right Ear: Tympanic membrane and ear canal normal.      Left Ear: Tympanic membrane and ear canal normal.      Nose: Nose normal.      Left Turbinates: Enlarged.      Right Sinus: No maxillary sinus tenderness or frontal sinus tenderness.      Left Sinus: No maxillary sinus tenderness or frontal sinus tenderness.      Mouth/Throat:      Lips: Pink.      Mouth: Mucous membranes are moist.      Pharynx: Uvula midline. No posterior oropharyngeal erythema.   Eyes:      Conjunctiva/sclera: Conjunctivae normal.   Cardiovascular:      Rate and Rhythm: Normal rate and regular rhythm.      Heart sounds: Normal heart sounds. No murmur heard.  Pulmonary:      Effort: Pulmonary effort is normal. No respiratory distress.      Breath sounds: Normal breath sounds. No wheezing.   Lymphadenopathy:      Cervical: No cervical adenopathy.   Skin:     General: Skin is warm and dry.   Neurological:      Mental Status: He is alert and oriented to person, place, and time.   Psychiatric:         Behavior: Behavior normal.         ED Course                 Procedures             Results for orders placed or performed during the hospital encounter of 06/14/23 (from the past 24 hour(s))   Group A Streptococcus PCR Throat Swab    Specimen: Throat; Swab   Result Value Ref Range    Group A strep by PCR Not Detected Not Detected    Narrative    The Xpert Xpress Strep A test, performed on the [employer], is a rapid, qualitative in vitro diagnostic test for the detection of Streptococcus pyogenes (Group A ß-hemolytic Streptococcus, Strep A) in throat swab specimens from patients with signs and symptoms of pharyngitis. The Xpert Xpress Strep A test can be used as an aid in the diagnosis of Group A Streptococcal pharyngitis. The assay is not intended to monitor treatment for Group A Streptococcus infections. The Xpert Xpress Strep A test utilizes an automated real-time polymerase chain reaction  (PCR) to detect Streptococcus pyogenes DNA.       Medications - No data to display    Assessments & Plan (with Medical Decision Making)     I have reviewed the nursing notes.    I have reviewed the findings, diagnosis, plan and need for follow up with the patient.  (J02.9) Viral pharyngitis  Comment: 48 year old male who presents with a 5-day history of sore throat with painful swallowing and left ear pain.  Took acetaminophen 2 days ago that did help decrease his discomfort.  No known sick contacts.  Had second COVID vaccination 2021 and Tdap November, 2022.  Non-smoker.  Denies fevers, chills, nausea, vomiting, headaches, and shortness of breath.    MDM: NHT. Lungs CTA  Strep test negative    Plan: Flonase and cetirizine. Education provided and/or discussed for this/these medications and for sore throats.  Treat symptoms conservatively with acetaminophen and  ibuprofen (if applicable) for fevers, body aches, and headaches, guaifenesin and/or honey for cough. May use chest rubs for sore throat and congestion, hot and cold liquids may help decrease sore throat and help you feel better. Increase fluids. You may utilize pseudoephedrine for congestion. Return to be reevaluated by ER/UC or your primary care provider if symptoms worsen, you develop breathing difficulties, or you do not improve in a reasonable time frame. It can take several days for a cough to resolve. It can take ten to fourteen days for upper respiratory symptoms to resolve. These discharge instructions and medications were reviewed with him and understanding verbalized.    This document was prepared using a combination of typing and voice generated software.  While every attempt was made for accuracy, spelling and grammatical errors may exist.    Discharge Medication List as of 6/14/2023 12:23 PM      START taking these medications    Details   cetirizine (ZYRTEC) 10 MG tablet Take 1 tablet (10 mg) by mouth 2 times daily as needed for allergies (1 tab  up to twice a day as needed for itch, rash, hives, allergy), Disp-20 tablet, R-0, E-Prescribe      fluticasone (FLONASE) 50 MCG/ACT nasal spray Spray 1 spray into both nostrils daily, Disp-18.2 mL, R-0, E-Prescribe             Final diagnoses:   Viral pharyngitis       6/14/2023   [facility]       Vanessa Perry, CNP  06/14/23 3553

## 2023-06-14 NOTE — ED TRIAGE NOTES
Pt presents with c/o sore throat  Did an e visit and the dr states that he could have strep and to come get tested.  States that he has pain more so on the front left side of his throat that radiates up to his ear.  State that its been about 5 days and just seems to be getting worse.  Congestion/drainage.   No otc meds taken

## 2023-06-14 NOTE — ED TRIAGE NOTES
Patient presents with c/o sore throat, with red spots. Symptoms started about 4/5 days ago. Denies any fevers.

## 2023-06-14 NOTE — DISCHARGE INSTRUCTIONS
Increase oral intake, cool mist vaporizer as needed, rest, avoid sharing utensils, practice good hand washing techniques, cover mouth when you cough and sneeze.  Over the counter medications such as ibuprofen and/or acetaminophen for fever and generalized aches and pains. Ibuprofen 400 to 800 mg (2 - 4 tabs of over the counter med) every six to eight hours as needed;not to exceed maximum amount of 3200 mg in 24 hours.Tylenol 650 to 1000 mg every four to six hours as needed (not to exceed more than 4000 mg in a 24 hour period). May use interchangeably. Mucinex (guaifenesin) for cough. Chest rubs such as Dale's or Mentholatum may help reduce sore throat symptoms.  Chloraseptic spray for sore throat or menthol lozenges may be helpful for sore throat. Be reevaluated if symptoms persist longer than 10 - 14 days or worsen and if there is no improvement in 72 hours or worsening of symptoms.  Increase fluids.     Loratadine (Claritin) or cetirizine (Zyrtec) 20 mg  daily for ten to fourteen days to see if symptoms lessen or resolve. If the medication seems to help you may take 10 mg daily on an ongoing basis.  May buy over the counter.    Fluids, herbs, and foods for sore throat relief -- Adjusting the temperature and texture of foods and beverages may provide local relief of sore throat pain. While data showing benefit are quite limited, these approaches are intuitive. We typically advise these measures since they are likely to be safe with minimal adverse effect, and patients often describe relief of symptoms.  We suggest hydration with frozen (eg, ice or popsicles) or heated liquids (eg, teas, soups), rather than room temperature or refrigerated fluids in patient with significant sore throat pain. Very cold foods can have a numbing-like effect that temporarily reduces or alleviates the pain of swallowing. Ice cubes or frozen popsicles facilitate hydration; ice cream and frozen yogurt provide caloric intake.  Warm fluids  and foods, including teas, soups, and soft non-irritating foods, may be better tolerated by patients with throat pain than irritating foods (eg, rough-textured or spicy foods) or fluids at room temperatures. Foods that coat the throat, including honey and hard candies, can facilitate intake of calories while temporarily relieving throat pain.

## 2023-06-23 DIAGNOSIS — R20.8 BURNING SENSATION OF FEET: ICD-10-CM

## 2023-06-23 NOTE — TELEPHONE ENCOUNTER
gabapentin (NEURONTIN) 300 MG capsule      Last Written Prescription Date:  4/3/23  Last Fill Quantity: 60,   # refills: 3  Last Office Visit: 4/27/23  Future Office visit:    Next 5 appointments (look out 90 days)    Aug 14, 2023 10:15 AM  (Arrive by 10:00 AM)  SHORT with Selwyn Ashraf MD  [facility] [address]  [place] [address]  [phone]           Routing refill request to provider for review/approval because:  Drug not on the [facility], P or [facility] refill protocol or controlled substance

## 2023-06-25 RX ORDER — GABAPENTIN 300 MG/1
CAPSULE ORAL
Qty: 180 CAPSULE | Refills: 3 | Status: SHIPPED | OUTPATIENT
Start: 2023-06-25 | End: 2024-07-03

## 2023-06-30 NOTE — TELEPHONE ENCOUNTER
Fax received 02/21/22 for pump setting/training.  
I called Madhu at [employer] and left a message to call back.  
I called [employer] they have not received the pt's order for his [employer] pump. He was going to give me the rep (Madhu's ) phone number but I explained that Madhu has not been returning our calls. I was provided with [employer] intake team phone number. We will fax form today.  
I called the pt and notified.   
Order for the Dexcom G6 sent to [facility].      Another email sent to Madhu from [facility].     Evelin Quiles APRN FNP-BC  Diabetes and Wound Care        
Pt called to check on status of phone call.  
Pt calls he needs a new sensor. He had one placed at his appt. He has not heard from the insulin pump company yet. Should we send an Rx for the sensor to the pharmacy? What company was his pump ordered through?  
We received a [employer] of Medical Necessity from [employer]. Insulin pump is in processing.  
PAST SURGICAL HISTORY:       S/P      S/P  section May 2017    S/P tonsillectomy childhood

## 2023-08-11 NOTE — TELEPHONE ENCOUNTER
Syringe Size Used (Required For Enhanced Ndc): 300 mg/2ml prefilled pen atorvastatin (LIPITOR) 20 MG tablet 90 tablet 3 5/31/2022     lov 12/29/22   Dupixent Amount: 300 mg Detail Level: None Date Of Next Injection: 2 Weeks Lot # (Optional): [id number] Expiration Date (Optional): 2025-04-30 Administered By (Optional): Neville Hill MA Consent: The risks of pain and injection site reactions were reviewed with the patient prior to the injection. Include J-Code In Bill: No J-Code:  Use Enhanced Ndc?: Yes Ndc (300 Mg Prefilled Syringe): [address]-72 Ndc (300 Mg Prefilled Pen): [address]-42 Ndc (200 Mg Prefilled Syringe): [address]-10 21927 Billing Preferences: 1

## 2023-09-15 ENCOUNTER — MYC MEDICAL ADVICE (OUTPATIENT)
Dept: FAMILY MEDICINE | Facility: OTHER | Age: 48
End: 2023-09-15

## 2023-09-15 DIAGNOSIS — E10.9 TYPE 1 DIABETES MELLITUS WITHOUT COMPLICATION (H): ICD-10-CM

## 2023-09-15 RX ORDER — PROCHLORPERAZINE 25 MG/1
1 SUPPOSITORY RECTAL
Qty: 1 EACH | Refills: 4 | Status: SHIPPED | OUTPATIENT
Start: 2023-09-15 | End: 2024-09-12

## 2023-09-15 NOTE — TELEPHONE ENCOUNTER
Pt called and would like his order for the his Dexom G6 transmitter be sent to [facility] as [facility] is unable to fill them.

## 2023-10-01 ENCOUNTER — MYC MEDICAL ADVICE (OUTPATIENT)
Dept: FAMILY MEDICINE | Facility: OTHER | Age: 48
End: 2023-10-01

## 2023-10-02 NOTE — TELEPHONE ENCOUNTER
10/2/2023 9:15 AM  See my chart message. Writer called patient. No answer. No Triage available at this time.

## 2023-10-02 NOTE — TELEPHONE ENCOUNTER
I called him -- pt to come in tomorrow. Lab/EKG first.  He will be little late possibly -- double book him at 945 tomorrow.

## 2023-10-03 ENCOUNTER — LAB (OUTPATIENT)
Dept: LAB | Facility: OTHER | Age: 48
End: 2023-10-03
Attending: FAMILY MEDICINE
Payer: COMMERCIAL

## 2023-10-03 ENCOUNTER — TELEPHONE (OUTPATIENT)
Dept: FAMILY MEDICINE | Facility: OTHER | Age: 48
End: 2023-10-03

## 2023-10-03 ENCOUNTER — ANCILLARY PROCEDURE (OUTPATIENT)
Dept: GENERAL RADIOLOGY | Facility: OTHER | Age: 48
End: 2023-10-03
Attending: FAMILY MEDICINE
Payer: COMMERCIAL

## 2023-10-03 ENCOUNTER — OFFICE VISIT (OUTPATIENT)
Dept: FAMILY MEDICINE | Facility: OTHER | Age: 48
End: 2023-10-03
Attending: FAMILY MEDICINE
Payer: COMMERCIAL

## 2023-10-03 VITALS
DIASTOLIC BLOOD PRESSURE: 80 MMHG | SYSTOLIC BLOOD PRESSURE: 122 MMHG | HEART RATE: 82 BPM | BODY MASS INDEX: 35.3 KG/M2 | TEMPERATURE: 97.5 F | OXYGEN SATURATION: 98 % | WEIGHT: 239.06 LBS

## 2023-10-03 DIAGNOSIS — I20.89 ANGINAL EQUIVALENT (H): ICD-10-CM

## 2023-10-03 DIAGNOSIS — E10.9 TYPE 1 DIABETES MELLITUS WITHOUT COMPLICATION (H): ICD-10-CM

## 2023-10-03 DIAGNOSIS — R74.8 ELEVATED ALKALINE PHOSPHATASE LEVEL: ICD-10-CM

## 2023-10-03 DIAGNOSIS — R06.09 DOE (DYSPNEA ON EXERTION): Primary | ICD-10-CM

## 2023-10-03 DIAGNOSIS — R06.09 DOE (DYSPNEA ON EXERTION): ICD-10-CM

## 2023-10-03 LAB
ALBUMIN SERPL BCG-MCNC: 4.5 G/DL (ref 3.5–5.2)
ALP SERPL-CCNC: 177 U/L (ref 40–129)
ALT SERPL W P-5'-P-CCNC: 41 U/L (ref 0–70)
ANION GAP SERPL CALCULATED.3IONS-SCNC: 12 MMOL/L (ref 7–15)
AST SERPL W P-5'-P-CCNC: 23 U/L (ref 0–45)
BASO+EOS+MONOS # BLD AUTO: ABNORMAL 10*3/UL
BASO+EOS+MONOS NFR BLD AUTO: ABNORMAL %
BASOPHILS # BLD AUTO: 0.1 10E3/UL (ref 0–0.2)
BASOPHILS NFR BLD AUTO: 1 %
BILIRUB SERPL-MCNC: 0.8 MG/DL
BUN SERPL-MCNC: 11.7 MG/DL (ref 6–20)
CALCIUM SERPL-MCNC: 9.7 MG/DL (ref 8.6–10)
CHLORIDE SERPL-SCNC: 103 MMOL/L (ref 98–107)
CREAT SERPL-MCNC: 0.81 MG/DL (ref 0.67–1.17)
CRP SERPL-MCNC: 10.15 MG/L
D DIMER PPP FEU-MCNC: <0.3 UG/ML FEU (ref 0–0.5)
DEPRECATED HCO3 PLAS-SCNC: 24 MMOL/L (ref 22–29)
EGFRCR SERPLBLD CKD-EPI 2021: >90 ML/MIN/1.73M2
EOSINOPHIL # BLD AUTO: 0.2 10E3/UL (ref 0–0.7)
EOSINOPHIL NFR BLD AUTO: 3 %
ERYTHROCYTE [DISTWIDTH] IN BLOOD BY AUTOMATED COUNT: 13.7 % (ref 10–15)
ERYTHROCYTE [SEDIMENTATION RATE] IN BLOOD BY WESTERGREN METHOD: 7 MM/HR (ref 0–15)
EST. AVERAGE GLUCOSE BLD GHB EST-MCNC: 160 MG/DL
GLUCOSE SERPL-MCNC: 189 MG/DL (ref 70–99)
HBA1C MFR BLD: 7.2 %
HCT VFR BLD AUTO: 39.8 % (ref 40–53)
HGB BLD-MCNC: 14.3 G/DL (ref 13.3–17.7)
IMM GRANULOCYTES # BLD: 0 10E3/UL
IMM GRANULOCYTES NFR BLD: 0 %
LYMPHOCYTES # BLD AUTO: 0.8 10E3/UL (ref 0.8–5.3)
LYMPHOCYTES NFR BLD AUTO: 15 %
MCH RBC QN AUTO: 28.9 PG (ref 26.5–33)
MCHC RBC AUTO-ENTMCNC: 35.9 G/DL (ref 31.5–36.5)
MCV RBC AUTO: 80 FL (ref 78–100)
MONOCYTES # BLD AUTO: 0.8 10E3/UL (ref 0–1.3)
MONOCYTES NFR BLD AUTO: 14 %
NEUTROPHILS # BLD AUTO: 3.8 10E3/UL (ref 1.6–8.3)
NEUTROPHILS NFR BLD AUTO: 67 %
NRBC # BLD AUTO: 0 10E3/UL
NRBC BLD AUTO-RTO: 0 /100
NT-PROBNP SERPL-MCNC: <36 PG/ML (ref 0–450)
PLATELET # BLD AUTO: 169 10E3/UL (ref 150–450)
POTASSIUM SERPL-SCNC: 4.1 MMOL/L (ref 3.4–5.3)
PROT SERPL-MCNC: 7.1 G/DL (ref 6.4–8.3)
RBC # BLD AUTO: 4.95 10E6/UL (ref 4.4–5.9)
SODIUM SERPL-SCNC: 139 MMOL/L (ref 135–145)
TSH SERPL DL<=0.005 MIU/L-ACNC: 1.31 UIU/ML (ref 0.3–4.2)
WBC # BLD AUTO: 5.6 10E3/UL (ref 4–11)

## 2023-10-03 PROCEDURE — 85652 RBC SED RATE AUTOMATED: CPT

## 2023-10-03 PROCEDURE — 86140 C-REACTIVE PROTEIN: CPT

## 2023-10-03 PROCEDURE — 83880 ASSAY OF NATRIURETIC PEPTIDE: CPT

## 2023-10-03 PROCEDURE — 80050 GENERAL HEALTH PANEL: CPT

## 2023-10-03 PROCEDURE — 71046 X-RAY EXAM CHEST 2 VIEWS: CPT | Mod: TC | Performed by: STUDENT IN AN ORGANIZED HEALTH CARE EDUCATION/TRAINING PROGRAM

## 2023-10-03 PROCEDURE — 84080 ASSAY ALKALINE PHOSPHATASES: CPT | Mod: 90 | Performed by: FAMILY MEDICINE

## 2023-10-03 PROCEDURE — 93000 ELECTROCARDIOGRAM COMPLETE: CPT | Mod: 77 | Performed by: INTERNAL MEDICINE

## 2023-10-03 PROCEDURE — 85379 FIBRIN DEGRADATION QUANT: CPT

## 2023-10-03 PROCEDURE — 80053 COMPREHEN METABOLIC PANEL: CPT

## 2023-10-03 PROCEDURE — 83036 HEMOGLOBIN GLYCOSYLATED A1C: CPT

## 2023-10-03 PROCEDURE — 99215 OFFICE O/P EST HI 40 MIN: CPT | Performed by: FAMILY MEDICINE

## 2023-10-03 PROCEDURE — 36415 COLL VENOUS BLD VENIPUNCTURE: CPT

## 2023-10-03 RX ORDER — METOPROLOL TARTRATE 50 MG
TABLET ORAL
Qty: 1 TABLET | Refills: 0 | Status: SHIPPED | OUTPATIENT
Start: 2023-10-03 | End: 2023-12-08

## 2023-10-03 ASSESSMENT — PAIN SCALES - GENERAL: PAINLEVEL: NO PAIN (0)

## 2023-10-03 NOTE — COMMUNITY RESOURCES LIST (ENGLISH)
10/03/2023   [facility]  N/A  For questions about this resource list or additional care needs, please contact your primary care clinic or care manager.  Phone: [phone]   Email: N/A   Address: [address] [place] [address]   Hours: N/A        Food and Nutrition       Food pantry  1  [facility] and Service [place] Distance: 3.04 miles      Pickup   [address] [place] [address]  Language: English  Hours: Mon 9:00 AM - 11:00 AM , Tue 1:00 PM - 3:00 PM , Wed - Thu 9:00 AM - 11:00 AM  Fees: Free, Self Pay   Phone: (297) 227-1277 Email: joe@[facility] Website: https://[facility].org/[facility]/[place]     2  [facility] [employer] Agency [facility] Free [facility] Distance: 22.61 miles      Pickup   [address] [place] [address]  Language: English  Hours: Mon - Sun Open 24 Hours  Fees: Free   Phone: (334) 480-6869 Email: luis@[employer]org Website: http://www.[employer]org     SNAP application assistance  3  [facility] & Human Services  Economic Services & Supports [facility] Distance: 3.68 miles      In-Person, Phone/Virtual   [address] [place] [address]  Language: English  Hours: Mon - Fri 8:00 AM - 4:30 PM  Fees: Free   Phone: (901) 895-6682 Website: https://www.[facility].gov/boffavaurra-b-u/public-health-human-services/economic-services-supports     [address] [place] [address] & Human Services - Economic Services & [facility] Distance: 22.71 miles      In-Person, Phone/Virtual   [address] [place] [address]  Language: English  Hours: Mon - Fri 8:00 AM - 4:30 PM  Fees: Free   Phone: (115) 803-4971 Email: financialassistance@[facility].gov Website: https://www.[place].gov/ymknlzsxoig-g-o/public-health-human-services/economic-services-supports     Soup kitchen or free meals  5  [facility] [place]  [facility] and [facility] Distance: 3.04 miles      Pickup   [address] [place] [address]  Language: English  Hours: Mon - Fri 4:00 PM - 4:45 PM  Fees: Free   Phone: (729) 329-2616 Email: joe@[facility].[employer] Website: https://centralusa.[facility].org/northern/[place]          Important Numbers & Websites       Emergency Services   911  [facility] Services   311  Poison Control   (911) 648-7673  Suicide Prevention Lifeline   (160) 557-5335 (TALK)  Child Abuse Hotline   (417) 620-6704 (4-A-Child)  Sexual Assault Hotline   (933) 239-5030 (HOPE)  National Runaway Safeline   (451) 233-9291 (RUNAWAY)  All-Options Talkline   (572) 957-7807  Substance Abuse Referral   (270) 909-8566 (HELP)

## 2023-10-03 NOTE — TELEPHONE ENCOUNTER
NO it is a CTA with calcium screen per what I got from DR Wills. I want to see the arteries. Ask Dr NELLA Peña

## 2023-10-03 NOTE — COMMUNITY RESOURCES LIST (ENGLISH)
10/03/2023   [facility]  N/A  For questions about this resource list or additional care needs, please contact your primary care clinic or care manager.  Phone: [phone]   Email: N/A   Address: [address] [place] [address]   Hours: N/A        Food and Nutrition       Food pantry  1  [facility] and Service [place] Distance: 3.04 miles      Pickup   [address] [place] [address]  Language: English  Hours: Mon 9:00 AM - 11:00 AM , Tue 1:00 PM - 3:00 PM , Wed - Thu 9:00 AM - 11:00 AM  Fees: Free, Self Pay   Phone: (814) 264-4865 Email: joe@[facility] Website: https://[facility].org/[facility]/[place]     2  [facility] Opportunity Agency [facility] Free [facility] Distance: 22.61 miles      Pickup   [address] [place] [address]  Language: English  Hours: Mon - Sun Open 24 Hours  Fees: Free   Phone: (343) 963-4456 Email: luis@[employer].org Website: http://www.[employer].org     SNAP application assistance  3  [facility] & Human Services  Economic Services & Supports [facility] Distance: 3.68 miles      In-Person, Phone/Virtual   [address] [place] [address]  Language: English  Hours: Mon - Fri 8:00 AM - 4:30 PM  Fees: Free   Phone: (789) 117-4617 Website: https://www.[facility].gov/tpgslkptita-h-f/public-health-human-services/economic-services-supports     [address] [place] [address] & Human Services - Economic Services & [facility] Distance: 22.71 miles      In-Person, Phone/Virtual   [address] [place] [address]  Language: English  Hours: Mon - Fri 8:00 AM - 4:30 PM  Fees: Free   Phone: (745) 193-7570 Email: financialassistance@[facility].gov Website: https://www.[place].gov/chvikeifgig-d-x/public-health-human-services/economic-services-supports     Soup kitchen or free meals  5  [facility] [place]  [facility] and Service Center Distance: 3.04 miles      Pickup   [address] [place] [address]  Language: English  Hours: Mon - Fri 4:00 PM - 4:45 PM  Fees: Free   Phone: (734) 501-1110 Email: joe@[facility] [place] [facility]\""[employer] Website: https://centralusa.[facility].org/northern/[place]          Important Numbers & Websites       Emergency Services   911  [facility] Services   311  Poison Control   (662) 833-2154  Suicide Prevention Lifeline   (441) 173-4802 (TALK)  Child Abuse Hotline   (226) 326-5330 (4-A-Child)  Sexual Assault Hotline   (761) 975-3959 (HOPE)  National Runaway Safeline   (119) 541-9431 (RUNAWAY)  All-Options Talkline   (244) 755-6268  Substance Abuse Referral   (988) 970-9093 (HELP)

## 2023-10-03 NOTE — PROGRESS NOTES
Assessment & Plan       ICD-10-CM    1. MCCONNELL (dyspnea on exertion)  R06.09 Comprehensive metabolic panel     CBC with Platelets & Differential     Hemoglobin A1c     TSH     XR Chest 2 Views     EKG 12-lead complete w/read - Clinics     Erythrocyte sedimentation rate auto     CRP inflammation     D dimer quantitative     N terminal pro BNP outpatient     EKG 12-lead complete w/read - Clinics     XR Chest 2 Views     CT Coronary Artery Angio w Calcium Score      2. Anginal equivalent  I20.89 CT Coronary Artery Angio w Calcium Score      3. Type 1 diabetes mellitus without complication (H)  E10.9 Comprehensive metabolic panel     CBC with Platelets & Differential     Hemoglobin A1c     TSH     XR Chest 2 Views     EKG 12-lead complete w/read - Clinics     Erythrocyte sedimentation rate auto     CRP inflammation     D dimer quantitative     N terminal pro BNP outpatient     EKG 12-lead complete w/read - Clinics     XR Chest 2 Views     CT Coronary Artery Angio w Calcium Score      4. Elevated alkaline phosphatase level  R74.8 Alkaline phosphatase isoenzymes      Large work up redone - all negative   Discussed with colleagues and radiology -- will order CTA of coronaries -- need to truly know if this is NOT his heart   IF and hope CTA completely negative - then recommend CTA /Plain CT of Lungs along with PFT.   Pt is in agreement with the plan  Symptomatic treatment was discussed along when patient should call and/or come back into the clinic or go to ER/Urgent care. All questions answered.         Review of external notes as documented elsewhere in note  Discussion of management or test interpretation with external physician/other qualified healthcare professional/appropriate source - fellow colleagues and radiology   Diagnosis or treatment significantly limited by social determinants of health - complex   Ordering of each unique test  Prescription drug management  47 minutes spent by me on the date of the encounter  "doing chart review, history and exam, documentation and further activities per the note       BMI:   Estimated body mass index is 35.3 kg/m  as calculated from the following:    Height as of 4/27/23: 1.753 m (5' 9\").    Weight as of this encounter: 108.4 kg (239 lb 1 oz).         No follow-ups on file.    Selwyn Ashraf MD  [facility] - SANJEE[place] Rios is a 48 year old, presenting for the following health issues:  Diabetes and Shortness of Breath        10/3/2023     9:41 AM   Additional Questions   Roomed by Caprice Victoria   Accompanied by None         10/3/2023     9:41 AM   Patient Reported Additional Medications   Patient reports taking the following new medications None       HPI    documented over exposure to asbestos that has been legally documented     Diabetes Follow-up    How often are you checking your blood sugar? Continuous glucose monitor  What time of day are you checking your blood sugars (select all that apply)?  Before and after meals  Have you had any blood sugars above 200?  Yes   Have you had any blood sugars below 70?  Yes   What symptoms do you notice when your blood sugar is low?  Shaky, Dizzy, Weak, Lethargy, Blurred vision, and Confusion  What concerns do you have today about your diabetes? None   Do you have any of these symptoms? (Select all that apply)  Burning in feet      BP Readings from Last 2 Encounters:   10/03/23 (!) 132/92   06/14/23 131/89     Hemoglobin A1C (%)   Date Value   10/03/2023 7.2 (H)   04/12/2023 7.1 (H)     LDL Cholesterol Calculated (mg/dL)   Date Value   04/12/2023 59   04/14/2022 57           Concern - Shortness of breath  Onset: ongoing for the last year   Description:   Shortness of breath mainly with activity, progressively getting worse.  Patient is experiencing coughing fits, and excessive sweating during these coughing fits.   Intensity: severe  Progression of Symptoms:  worsening  Accompanying Signs & Symptoms:  Coughing " fits, excessive sweating, the feeling of struggling to get a good breath, dizziness   Previous history of similar problem:   Ongoing for the last year   Precipitating factors:   Worsened by: Any Activity   Alleviating factors:  Improved by: Shower and just waiting for it to pass   This is going on for over a year  Groceries and going up flight of steps --  significant MCCONNELL/SOB and extreme sweating   Flat ground -- 100-150 yrds - increase MCCONNELL /sweating - profuse sweating   Up and down bending - same   If push real hard -- will cough and then go into coughing fits to point of worse breathing       GXT/Echo and w/unit(s) negative last year  Getting some worse.     Last year - gave inhalers - not help with coughing or MCCONNELL     No FH of CAD       Therapies Tried and outcome: None       Review of Systems   Constitutional, HEENT, cardiovascular, pulmonary, GI, , musculoskeletal, neuro, skin, endocrine and psych systems are negative, except as otherwise noted.      Objective    BP (!) 132/92 (BP Location: Right arm, Patient Position: Sitting, Cuff Size: Adult Large)   Pulse 82   Temp 97.5  F (36.4  C) (Tympanic)   Wt 108.4 kg (239 lb 1 oz)   SpO2 98%   BMI 35.30 kg/m    Body mass index is 35.3 kg/m .  Physical Exam   GENERAL: healthy, alert and no distress  EYES: Eyes grossly normal to inspection, PERRL and conjunctivae and sclerae normal  HENT: ear canals and TM's normal, nose and mouth without ulcers or lesions  NECK: no adenopathy, no asymmetry, masses, or scars and thyroid normal to palpation  RESP: lungs clear to auscultation - no rales, rhonchi or wheezes  CV: regular rate and rhythm, normal S1 S2, no S3 or S4, no murmur, click or rub, no peripheral edema and peripheral pulses strong  ABDOMEN: soft, nontender, no hepatosplenomegaly, no masses and bowel sounds normal  MS: no gross musculoskeletal defects noted, no edema  SKIN: no suspicious lesions or rashes  NEURO: Normal strength and tone, mentation intact and  speech normal  PSYCH: mentation appears normal, affect normal/bright        Results for orders placed or performed in visit on 10/03/23   Comprehensive metabolic panel     Status: Abnormal   Result Value Ref Range    Sodium 139 135 - 145 mmol/L    Potassium 4.1 3.4 - 5.3 mmol/L    Carbon Dioxide (CO2) 24 22 - 29 mmol/L    Anion Gap 12 7 - 15 mmol/L    Urea Nitrogen 11.7 6.0 - 20.0 mg/dL    Creatinine 0.81 0.67 - 1.17 mg/dL    GFR Estimate >90 >60 mL/min/1.73m2    Calcium 9.7 8.6 - 10.0 mg/dL    Chloride 103 98 - 107 mmol/L    Glucose 189 (H) 70 - 99 mg/dL    Alkaline Phosphatase 177 (H) 40 - 129 U/L    AST 23 0 - 45 U/L    ALT 41 0 - 70 U/L    Protein Total 7.1 6.4 - 8.3 g/dL    Albumin 4.5 3.5 - 5.2 g/dL    Bilirubin Total 0.8 <=1.2 mg/dL   Hemoglobin A1c     Status: Abnormal   Result Value Ref Range    Estimated Average Glucose 160 mg/dL    Hemoglobin A1C 7.2 (H) <5.7 %   TSH     Status: Normal   Result Value Ref Range    TSH 1.31 0.30 - 4.20 uIU/mL   Erythrocyte sedimentation rate auto     Status: Normal   Result Value Ref Range    Erythrocyte Sedimentation Rate 7 0 - 15 mm/hr   CRP inflammation     Status: Abnormal   Result Value Ref Range    CRP Inflammation 10.15 (H) <5.00 mg/L   D dimer quantitative     Status: Normal   Result Value Ref Range    D-Dimer Quantitative <0.30 0.00 - 0.50 ug/mL FEU    Narrative    This D-dimer assay is intended for use in conjunction with a clinical pretest probability assessment model to exclude pulmonary embolism (PE) and deep venous thrombosis (DVT) in outpatients suspected of PE or DVT. The cut-off value is 0.50 ug/mL FEU.   N terminal pro BNP outpatient     Status: Normal   Result Value Ref Range    N Terminal Pro BNP Outpatient <36 0 - 450 pg/mL   CBC with platelets and differential     Status: Abnormal   Result Value Ref Range    WBC Count 5.6 4.0 - 11.0 10e3/uL    RBC Count 4.95 4.40 - 5.90 10e6/uL    Hemoglobin 14.3 13.3 - 17.7 g/dL    Hematocrit 39.8 (L) 40.0 - 53.0 %     MCV 80 78 - 100 fL    MCH 28.9 26.5 - 33.0 pg    MCHC 35.9 31.5 - 36.5 g/dL    RDW 13.7 10.0 - 15.0 %    Platelet Count 169 150 - 450 10e3/uL    % Neutrophils 67 %    % Lymphocytes 15 %    % Monocytes 14 %    Mids % (Monos, Eos, Basos)      % Eosinophils 3 %    % Basophils 1 %    % Immature Granulocytes 0 %    NRBCs per 100 WBC 0 <1 /100    Absolute Neutrophils 3.8 1.6 - 8.3 10e3/uL    Absolute Lymphocytes 0.8 0.8 - 5.3 10e3/uL    Absolute Monocytes 0.8 0.0 - 1.3 10e3/uL    Mids Abs (Monos, Eos, Basos)      Absolute Eosinophils 0.2 0.0 - 0.7 10e3/uL    Absolute Basophils 0.1 0.0 - 0.2 10e3/uL    Absolute Immature Granulocytes 0.0 <=0.4 10e3/uL    Absolute NRBCs 0.0 10e3/uL   CBC with Platelets & Differential     Status: Abnormal    Narrative    The following orders were created for panel order CBC with Platelets & Differential.  Procedure                               Abnormality         Status                     ---------                               -----------         ------                     CBC with platelets and d...[[id number]]  Abnormal            Final result                 Please view results for these tests on the individual orders.   Results for orders placed or performed in visit on 10/03/23   XR Chest 2 Views     Status: None    Narrative    Exam:  XR CHEST 2 VIEWS    HISTORY: MCCONNELL (dyspnea on exertion); Type 1 diabetes mellitus without  complication (H).    COMPARISON:  5/24/2022    FINDINGS:     The cardiomediastinal contours are normal.      No focal consolidation, effusion, or pneumothorax.      No acute osseous abnormality.       Impression    IMPRESSION:      No acute cardiopulmonary process.      QUINTIN BEARD MD         SYSTEM ID:  [id number]   EKG 12-lead complete w/read - Clinics     Status: None (Preliminary result)   Result Value Ref Range    Systolic Blood Pressure  mmHg    Diastolic Blood Pressure  mmHg    Ventricular Rate 70 BPM    Atrial Rate 70 BPM    AK Interval 162 ms    QRS  Duration 82 ms     ms    QTc 408 ms    P Axis 30 degrees    R AXIS 40 degrees    T Axis 34 degrees    Interpretation ECG       Sinus rhythm  Normal ECG  No previous ECGs available       EKG unremarkable

## 2023-10-07 LAB
ALP BONE SERPL-CCNC: 47 U/L
ALP LIVER SERPL-CCNC: 122 U/L
ALP OTHER SERPL-CCNC: 0 U/L
ALP SERPL-CCNC: 169 U/L
ATRIAL RATE - MUSE: 70 BPM
DIASTOLIC BLOOD PRESSURE - MUSE: NORMAL MMHG
INTERPRETATION ECG - MUSE: NORMAL
P AXIS - MUSE: 30 DEGREES
PR INTERVAL - MUSE: 162 MS
QRS DURATION - MUSE: 82 MS
QT - MUSE: 378 MS
QTC - MUSE: 408 MS
R AXIS - MUSE: 40 DEGREES
SYSTOLIC BLOOD PRESSURE - MUSE: NORMAL MMHG
T AXIS - MUSE: 34 DEGREES
VENTRICULAR RATE- MUSE: 70 BPM

## 2023-10-09 DIAGNOSIS — R74.8 ELEVATED ALKALINE PHOSPHATASE LEVEL: Primary | ICD-10-CM

## 2023-10-17 ENCOUNTER — TELEPHONE (OUTPATIENT)
Dept: INTERVENTIONAL RADIOLOGY/VASCULAR | Facility: HOSPITAL | Age: 48
End: 2023-10-17

## 2023-10-17 ENCOUNTER — HOSPITAL ENCOUNTER (OUTPATIENT)
Dept: ULTRASOUND IMAGING | Facility: HOSPITAL | Age: 48
Discharge: HOME | End: 2023-10-17
Attending: FAMILY MEDICINE | Admitting: FAMILY MEDICINE
Payer: COMMERCIAL

## 2023-10-17 DIAGNOSIS — R74.8 ELEVATED ALKALINE PHOSPHATASE LEVEL: ICD-10-CM

## 2023-10-17 PROCEDURE — 76705 ECHO EXAM OF ABDOMEN: CPT

## 2023-10-17 RX ORDER — METOPROLOL TARTRATE 1 MG/ML
5-15 INJECTION, SOLUTION INTRAVENOUS
Status: CANCELLED | OUTPATIENT
Start: 2023-10-17

## 2023-10-17 RX ORDER — NITROGLYCERIN 0.4 MG/1
0.4 TABLET SUBLINGUAL
Status: CANCELLED | OUTPATIENT
Start: 2023-10-17

## 2023-10-17 RX ORDER — ONDANSETRON 2 MG/ML
4 INJECTION INTRAMUSCULAR; INTRAVENOUS
Status: CANCELLED | OUTPATIENT
Start: 2023-10-17

## 2023-10-17 RX ORDER — ACYCLOVIR 200 MG/1
0-1 CAPSULE ORAL
Status: CANCELLED | OUTPATIENT
Start: 2023-10-17

## 2023-10-17 RX ORDER — METHYLPREDNISOLONE SODIUM SUCCINATE 125 MG/2ML
125 INJECTION, POWDER, LYOPHILIZED, FOR SOLUTION INTRAMUSCULAR; INTRAVENOUS
Status: CANCELLED | OUTPATIENT
Start: 2023-10-17

## 2023-10-17 RX ORDER — METOPROLOL TARTRATE 25 MG/1
25-100 TABLET, FILM COATED ORAL
Status: CANCELLED | OUTPATIENT
Start: 2023-10-17

## 2023-10-17 RX ORDER — DIPHENHYDRAMINE HYDROCHLORIDE 50 MG/ML
25-50 INJECTION INTRAMUSCULAR; INTRAVENOUS
Status: CANCELLED | OUTPATIENT
Start: 2023-10-17

## 2023-10-17 RX ORDER — DIPHENHYDRAMINE HCL 25 MG
25 CAPSULE ORAL
Status: CANCELLED | OUTPATIENT
Start: 2023-10-17

## 2023-10-17 NOTE — PROVIDER NOTIFICATION
Called pt to remind of procedure tomorrow and to arrive at 0800. Pt also informed to not drink or eat after midnight, no caffeine after 2000 tonight. Pt states he is not on any inhalers or Viagra.

## 2023-10-18 ENCOUNTER — HOSPITAL ENCOUNTER (OUTPATIENT)
Dept: CT IMAGING | Facility: HOSPITAL | Age: 48
Discharge: HOME | End: 2023-10-18
Attending: FAMILY MEDICINE | Admitting: RADIOLOGY
Payer: COMMERCIAL

## 2023-10-18 ENCOUNTER — HOSPITAL ENCOUNTER (OUTPATIENT)
Facility: HOSPITAL | Age: 48
Discharge: HOME | End: 2023-10-18
Attending: RADIOLOGY | Admitting: RADIOLOGY
Payer: COMMERCIAL

## 2023-10-18 VITALS
HEART RATE: 67 BPM | SYSTOLIC BLOOD PRESSURE: 112 MMHG | RESPIRATION RATE: 16 BRPM | DIASTOLIC BLOOD PRESSURE: 78 MMHG | OXYGEN SATURATION: 94 %

## 2023-10-18 DIAGNOSIS — R06.09 DOE (DYSPNEA ON EXERTION): Primary | ICD-10-CM

## 2023-10-18 DIAGNOSIS — R06.09 DOE (DYSPNEA ON EXERTION): ICD-10-CM

## 2023-10-18 DIAGNOSIS — I20.89 ANGINAL EQUIVALENT (H): ICD-10-CM

## 2023-10-18 DIAGNOSIS — R91.8 ABNORMAL FINDING ON LUNG IMAGING: ICD-10-CM

## 2023-10-18 DIAGNOSIS — E10.9 TYPE 1 DIABETES MELLITUS WITHOUT COMPLICATION (H): ICD-10-CM

## 2023-10-18 PROCEDURE — 250N000013 HC RX MED GY IP 250 OP 250 PS 637: Performed by: RADIOLOGY

## 2023-10-18 PROCEDURE — 258N000003 HC RX IP 258 OP 636: Performed by: RADIOLOGY

## 2023-10-18 PROCEDURE — 250N000011 HC RX IP 250 OP 636: Performed by: RADIOLOGY

## 2023-10-18 PROCEDURE — 75574 CT ANGIO HRT W/3D IMAGE: CPT

## 2023-10-18 RX ORDER — IOPAMIDOL 755 MG/ML
98 INJECTION, SOLUTION INTRAVASCULAR ONCE
Status: COMPLETED | OUTPATIENT
Start: 2023-10-18 | End: 2023-10-18

## 2023-10-18 RX ORDER — NITROGLYCERIN 0.4 MG/1
0.4 TABLET SUBLINGUAL
Status: DISCONTINUED | OUTPATIENT
Start: 2023-10-18 | End: 2023-10-19 | Stop reason: HOSPADM

## 2023-10-18 RX ORDER — DIPHENHYDRAMINE HCL 25 MG
25 CAPSULE ORAL
Status: DISCONTINUED | OUTPATIENT
Start: 2023-10-18 | End: 2023-10-19 | Stop reason: HOSPADM

## 2023-10-18 RX ORDER — DIPHENHYDRAMINE HYDROCHLORIDE 50 MG/ML
25-50 INJECTION INTRAMUSCULAR; INTRAVENOUS
Status: DISCONTINUED | OUTPATIENT
Start: 2023-10-18 | End: 2023-10-19 | Stop reason: HOSPADM

## 2023-10-18 RX ORDER — ONDANSETRON 2 MG/ML
4 INJECTION INTRAMUSCULAR; INTRAVENOUS
Status: DISCONTINUED | OUTPATIENT
Start: 2023-10-18 | End: 2023-10-19 | Stop reason: HOSPADM

## 2023-10-18 RX ORDER — METOPROLOL TARTRATE 25 MG/1
25-100 TABLET, FILM COATED ORAL
Status: DISCONTINUED | OUTPATIENT
Start: 2023-10-18 | End: 2023-10-19 | Stop reason: HOSPADM

## 2023-10-18 RX ORDER — METHYLPREDNISOLONE SODIUM SUCCINATE 125 MG/2ML
125 INJECTION, POWDER, LYOPHILIZED, FOR SOLUTION INTRAMUSCULAR; INTRAVENOUS
Status: DISCONTINUED | OUTPATIENT
Start: 2023-10-18 | End: 2023-10-19 | Stop reason: HOSPADM

## 2023-10-18 RX ORDER — METOPROLOL TARTRATE 1 MG/ML
5-15 INJECTION, SOLUTION INTRAVENOUS
Status: DISCONTINUED | OUTPATIENT
Start: 2023-10-18 | End: 2023-10-19 | Stop reason: HOSPADM

## 2023-10-18 RX ORDER — ACYCLOVIR 200 MG/1
0-1 CAPSULE ORAL
Status: DISCONTINUED | OUTPATIENT
Start: 2023-10-18 | End: 2023-10-19 | Stop reason: HOSPADM

## 2023-10-18 RX ADMIN — SODIUM CHLORIDE 100 ML: 9 INJECTION, SOLUTION INTRAVENOUS at 09:36

## 2023-10-18 RX ADMIN — IOPAMIDOL 98 ML: 755 INJECTION, SOLUTION INTRAVENOUS at 09:35

## 2023-10-18 RX ADMIN — NITROGLYCERIN 0.4 MG: 0.4 TABLET SUBLINGUAL at 09:34

## 2023-10-18 ASSESSMENT — ACTIVITIES OF DAILY LIVING (ADL): ADLS_ACUITY_SCORE: 35

## 2023-10-18 NOTE — PROGRESS NOTES
Tolerated procedure well.    Pt to CT supine on table.  # 18 Angio started in right AC. States chest pain 0/10.  VSS. Heart rhythm NSR.  Given NTG 0.4 mg SL for procedure.  Tolerated procedure well.       Discharged to home.  Pt instructions given.     Procedure: CTA    Position:  supine    Pain:  0     Condition: Stable    Comments: See dictated procedure note for full details     LEILANI ABDULLAHI RN

## 2023-10-20 ENCOUNTER — HOSPITAL ENCOUNTER (OUTPATIENT)
Dept: CT IMAGING | Facility: HOSPITAL | Age: 48
Discharge: HOME | End: 2023-10-20
Attending: FAMILY MEDICINE | Admitting: FAMILY MEDICINE
Payer: COMMERCIAL

## 2023-10-20 DIAGNOSIS — R91.8 ABNORMAL FINDING ON LUNG IMAGING: ICD-10-CM

## 2023-10-20 DIAGNOSIS — R06.09 DOE (DYSPNEA ON EXERTION): ICD-10-CM

## 2023-10-20 PROCEDURE — 250N000011 HC RX IP 250 OP 636: Performed by: RADIOLOGY

## 2023-10-20 PROCEDURE — 71260 CT THORAX DX C+: CPT

## 2023-10-20 RX ORDER — IOPAMIDOL 755 MG/ML
63 INJECTION, SOLUTION INTRAVASCULAR ONCE
Status: COMPLETED | OUTPATIENT
Start: 2023-10-20 | End: 2023-10-20

## 2023-10-20 RX ADMIN — IOPAMIDOL 63 ML: 755 INJECTION, SOLUTION INTRAVENOUS at 14:07

## 2023-11-08 DIAGNOSIS — E10.9 TYPE 1 DIABETES MELLITUS WITHOUT COMPLICATION (H): ICD-10-CM

## 2023-11-09 RX ORDER — INSULIN LISPRO 100 [IU]/ML
INJECTION, SOLUTION INTRAVENOUS; SUBCUTANEOUS
Qty: 140 ML | Refills: 0 | Status: SHIPPED | OUTPATIENT
Start: 2023-11-09 | End: 2023-12-13

## 2023-11-14 ENCOUNTER — HOSPITAL ENCOUNTER (OUTPATIENT)
Dept: RESPIRATORY THERAPY | Facility: HOSPITAL | Age: 48
Discharge: HOME | End: 2023-11-14
Attending: FAMILY MEDICINE | Admitting: INTERNAL MEDICINE
Payer: COMMERCIAL

## 2023-11-14 DIAGNOSIS — R06.09 DOE (DYSPNEA ON EXERTION): ICD-10-CM

## 2023-11-14 DIAGNOSIS — R91.8 ABNORMAL FINDING ON LUNG IMAGING: ICD-10-CM

## 2023-11-14 PROCEDURE — 250N000009 HC RX 250: Performed by: FAMILY MEDICINE

## 2023-11-14 PROCEDURE — 94060 EVALUATION OF WHEEZING: CPT

## 2023-11-14 PROCEDURE — 94726 PLETHYSMOGRAPHY LUNG VOLUMES: CPT

## 2023-11-14 PROCEDURE — 94726 PLETHYSMOGRAPHY LUNG VOLUMES: CPT | Mod: 26 | Performed by: INTERNAL MEDICINE

## 2023-11-14 PROCEDURE — 94729 DIFFUSING CAPACITY: CPT | Mod: 26 | Performed by: INTERNAL MEDICINE

## 2023-11-14 PROCEDURE — 94060 EVALUATION OF WHEEZING: CPT | Mod: 26 | Performed by: INTERNAL MEDICINE

## 2023-11-14 PROCEDURE — 94729 DIFFUSING CAPACITY: CPT

## 2023-11-14 RX ORDER — ALBUTEROL SULFATE 0.83 MG/ML
2.5 SOLUTION RESPIRATORY (INHALATION)
Status: COMPLETED | OUTPATIENT
Start: 2023-11-14 | End: 2023-11-14

## 2023-11-14 RX ADMIN — ALBUTEROL SULFATE 2.5 MG: 2.5 SOLUTION RESPIRATORY (INHALATION) at 11:02

## 2023-11-16 ENCOUNTER — MYC MEDICAL ADVICE (OUTPATIENT)
Dept: FAMILY MEDICINE | Facility: OTHER | Age: 48
End: 2023-11-16

## 2023-11-22 NOTE — TELEPHONE ENCOUNTER
Pt called and was informed of PFT result note from PCP: Call pt - PFT are moderately abnormal. See what Pulmonology says - will send to Pulmonologist for next week appt.  Highly recommend flu shot / rsv and covid booster  due to his lung issues    Pt verbalized understanding.

## 2023-11-22 NOTE — TELEPHONE ENCOUNTER
Please send pt pulmonologist his pulmonary function results.  Pt scheduled 11/28/23 at 9:30 [facility] with Dr. Hanna.

## 2023-11-28 ENCOUNTER — TRANSFERRED RECORDS (OUTPATIENT)
Dept: HEALTH INFORMATION MANAGEMENT | Facility: CLINIC | Age: 48
End: 2023-11-28
Payer: COMMERCIAL

## 2023-12-07 ENCOUNTER — MYC MEDICAL ADVICE (OUTPATIENT)
Dept: FAMILY MEDICINE | Facility: OTHER | Age: 48
End: 2023-12-07

## 2023-12-07 ENCOUNTER — TRANSFERRED RECORDS (OUTPATIENT)
Dept: HEALTH INFORMATION MANAGEMENT | Facility: HOSPITAL | Age: 48
End: 2023-12-07

## 2023-12-10 ENCOUNTER — HEALTH MAINTENANCE LETTER (OUTPATIENT)
Age: 48
End: 2023-12-10

## 2023-12-11 ENCOUNTER — TELEPHONE (OUTPATIENT)
Dept: FAMILY MEDICINE | Facility: OTHER | Age: 48
End: 2023-12-11

## 2023-12-11 DIAGNOSIS — D84.9 IMMUNOCOMPROMISED (H): Primary | ICD-10-CM

## 2023-12-11 DIAGNOSIS — E10.9 TYPE 1 DIABETES MELLITUS WITHOUT COMPLICATION (H): ICD-10-CM

## 2023-12-11 RX ORDER — INSULIN ASPART 100 [IU]/ML
INJECTION, SOLUTION INTRAVENOUS; SUBCUTANEOUS
Qty: 50 ML | Refills: 4 | Status: SHIPPED | OUTPATIENT
Start: 2023-12-11

## 2023-12-11 NOTE — TELEPHONE ENCOUNTER
10:59 AM    Reason for Call: OVERKEELY    Received Dx from [facility], didn't disclose what it was. Needs waiver for work due to diagnosis from [facility] lab results. He leaves this Friday for vacation.     The patient is requesting an appointment for ASAP, before Friday 12/15 with Dr Chauhan.    Was an appointment offered for this call? No    Preferred method for responding to this message: Telephone Call  What is your phone number [phone]    If we cannot reach you directly, may we leave a detailed response at the number you provided? Yes    Can this message wait until your PCP/provider returns, if unavailable today? Not applicable    [employer]

## 2023-12-13 ENCOUNTER — OFFICE VISIT (OUTPATIENT)
Dept: FAMILY MEDICINE | Facility: OTHER | Age: 48
End: 2023-12-13
Attending: FAMILY MEDICINE
Payer: COMMERCIAL

## 2023-12-13 ENCOUNTER — LAB (OUTPATIENT)
Dept: LAB | Facility: OTHER | Age: 48
End: 2023-12-13
Attending: FAMILY MEDICINE
Payer: COMMERCIAL

## 2023-12-13 VITALS
WEIGHT: 238.1 LBS | HEART RATE: 85 BPM | DIASTOLIC BLOOD PRESSURE: 80 MMHG | HEIGHT: 69 IN | SYSTOLIC BLOOD PRESSURE: 122 MMHG | BODY MASS INDEX: 35.27 KG/M2 | OXYGEN SATURATION: 95 % | TEMPERATURE: 97.4 F

## 2023-12-13 DIAGNOSIS — T38.0X5S STEROID SIDE EFFECTS, SEQUELA: ICD-10-CM

## 2023-12-13 DIAGNOSIS — E10.9 TYPE 1 DIABETES MELLITUS WITHOUT COMPLICATION (H): ICD-10-CM

## 2023-12-13 DIAGNOSIS — D86.0 PULMONARY SARCOIDOSIS (H): Primary | ICD-10-CM

## 2023-12-13 DIAGNOSIS — D84.9 IMMUNOCOMPROMISED (H): ICD-10-CM

## 2023-12-13 DIAGNOSIS — R06.09 DOE (DYSPNEA ON EXERTION): ICD-10-CM

## 2023-12-13 DIAGNOSIS — E10.65 TYPE 1 DIABETES MELLITUS WITH HYPERGLYCEMIA (H): ICD-10-CM

## 2023-12-13 LAB
ANION GAP SERPL CALCULATED.3IONS-SCNC: 12 MMOL/L (ref 7–15)
BUN SERPL-MCNC: 18.3 MG/DL (ref 6–20)
CALCIUM SERPL-MCNC: 9.5 MG/DL (ref 8.6–10)
CHLORIDE SERPL-SCNC: 99 MMOL/L (ref 98–107)
CREAT SERPL-MCNC: 0.84 MG/DL (ref 0.67–1.17)
DEPRECATED HCO3 PLAS-SCNC: 26 MMOL/L (ref 22–29)
EGFRCR SERPLBLD CKD-EPI 2021: >90 ML/MIN/1.73M2
GLUCOSE SERPL-MCNC: 202 MG/DL (ref 70–99)
POTASSIUM SERPL-SCNC: 4.1 MMOL/L (ref 3.4–5.3)
SODIUM SERPL-SCNC: 137 MMOL/L (ref 135–145)

## 2023-12-13 PROCEDURE — 99215 OFFICE O/P EST HI 40 MIN: CPT | Performed by: FAMILY MEDICINE

## 2023-12-13 PROCEDURE — 80048 BASIC METABOLIC PNL TOTAL CA: CPT

## 2023-12-13 PROCEDURE — 36415 COLL VENOUS BLD VENIPUNCTURE: CPT

## 2023-12-13 RX ORDER — SULFAMETHOXAZOLE/TRIMETHOPRIM 800-160 MG
1 TABLET ORAL
COMMUNITY
Start: 2023-12-07

## 2023-12-13 RX ORDER — PREDNISONE 20 MG/1
20 TABLET ORAL 2 TIMES DAILY
COMMUNITY
Start: 2023-12-07

## 2023-12-13 RX ORDER — INSULIN LISPRO 100 [IU]/ML
INJECTION, SOLUTION INTRAVENOUS; SUBCUTANEOUS
Qty: 140 ML | Refills: 4 | Status: SHIPPED | OUTPATIENT
Start: 2023-12-13

## 2023-12-13 ASSESSMENT — PAIN SCALES - GENERAL: PAINLEVEL: NO PAIN (0)

## 2023-12-13 ASSESSMENT — ASTHMA QUESTIONNAIRES: ACT_TOTALSCORE: 17

## 2023-12-13 NOTE — PROGRESS NOTES
"  Assessment & Plan       ICD-10-CM    1. Pulmonary sarcoidosis (H24)  D86.0       2. Type 1 diabetes mellitus without complication (H)  E10.9       3. MCCONNELL (dyspnea on exertion)  R06.09       4. Type 1 diabetes mellitus with hyperglycemia (H)  E10.65       5. Steroid side effects, sequela  T38.0X5S       Will follow Pulmonology lead   Pt to monitor and tightly control Blood sugars due to DM type 1 and high dose steroids  Discussed side effects regarding steroids - pt to try OTC sleep aids   Regards to work - has been off since 12/1-- will extend to 1/14/24-- allow time to have meds works so not so symptomatic in [employer] environment/ adjust to steroids / adjust his insulin to steroids etc.   Pt to discuss time off with Pulmonology   Assess in clinic the week before going to work     Continue current medications and behavioral changes.   Symptomatic treatment was discussed along when patient should call and/or come back into the clinic or go to ER/Urgent care. All questions answered.   Forms filled out today     Review of external notes as documented elsewhere in note  Discussion of management or test interpretation with external physician/other qualified healthcare professional/appropriate source - worked with our RN to work on disability forms etc   Diagnosis or treatment significantly limited by social determinants of health - complex  Prescription drug management  40 minutes spent by me on the date of the encounter doing chart review, history and exam, documentation and further activities per the note       BMI:   Estimated body mass index is 35.16 kg/m  as calculated from the following:    Height as of this encounter: 1.753 m (5' 9\").    Weight as of this encounter: 108 kg (238 lb 1.6 oz).           No follow-ups on file.    Selwyn Ashraf MD  [facility] - [place]EE[place] Rios is a 48 year old, presenting for the following health issues:  RECHECK        12/13/2023    11:01 AM   Additional " "Questions   Roomed by Beatriz HYMAN LPN   Accompanied by self       HPI     Appointment Followup:    Facility:  [place], [place]  Date of visit: 12/07/2023  Reason for visit: Pulmonary Sarcoidosis  Current Status:  taking prednisone. Needs sleep aide the steroids are keeping him up. 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     Works in the mines  Drives truck  Was having MCCONNELL and increase SOB  Now found to have Pulmonary Sarcoidosis   PFT show moderate Obstructive Airway Dz on 11/14    Has seen Pulmonology 11/28 and had Bronchoscopy on 12/7  Started Steroids on 12/9    Pt on Prednisone 40mg daily for 10-12 weeks along with Bactrim   Then will wean down after that.  Pt is seeing Pulmonologist Dr Portillo at [facility]     Pt has been getting worse in last month or so - with increase dust / cold air -- producing coughing fits and wheezing -- pt feels that has not improve yet but can take deeper breaths  Pt also dealing with higher blood sugars and has been titrating up basal and boluses on insulin pump    Pt dealing with steroid insomnia and hyperactivity --not uses or tried anything for sleep     Pt has PFT testing again to see response -first part of Feb- currently 2/6  DR Portillo appt 2/13         Review of Systems   Constitutional, HEENT, cardiovascular, pulmonary, gi and gu systems are negative, except as otherwise noted.      Objective    /80 (BP Location: Right arm, Patient Position: Sitting, Cuff Size: Adult Large)   Pulse 85   Temp 97.4  F (36.3  C) (Tympanic)   Ht 1.753 m (5' 9\")   Wt 108 kg (238 lb 1.6 oz)   SpO2 95%   BMI 35.16 kg/m    Body mass index is 35.16 kg/m .  Physical Exam   GENERAL: healthy, alert and no distress  RESP: lungs clear to auscultation - no rales, rhonchi or wheezes  CV: regular rate and rhythm, normal S1 S2, no S3 or S4, no murmur, click or rub, no peripheral edema and peripheral pulses strong            "

## 2024-01-01 DIAGNOSIS — E78.2 MIXED HYPERLIPIDEMIA: ICD-10-CM

## 2024-01-02 RX ORDER — ATORVASTATIN CALCIUM 20 MG/1
20 TABLET, FILM COATED ORAL DAILY
Qty: 90 TABLET | Refills: 0 | Status: SHIPPED | OUTPATIENT
Start: 2024-01-02 | End: 2024-08-07

## 2024-01-02 NOTE — TELEPHONE ENCOUNTER
Lipitor       Last Written Prescription Date:  1/17/2023  Last Fill Quantity: 90,   # refills: 3  Last Office Visit: 12/13/2023  Future Office visit:    Next 5 appointments (look out 90 days)      Jan 12, 2024 10:15 AM  (Arrive by 10:00 AM)  SHORT with Selwyn Ashraf MD  [facility] [place] ([facility] [place] ) [address]  [place] [address]  [phone]     Feb 13, 2024  9:45 AM  (Arrive by 9:30 AM)  SHORT with Selwyn Ashraf MD  [facility] [place] ([facility] [place] ) [address]  [place] [address]  [phone]

## 2024-01-05 ENCOUNTER — TELEPHONE (OUTPATIENT)
Dept: FAMILY MEDICINE | Facility: OTHER | Age: 49
End: 2024-01-05

## 2024-01-05 NOTE — TELEPHONE ENCOUNTER
9:52 AM    Reason for Call: Phone Call    Description: Patient is calling stating he is currently on SNA, and patient needs a return to work authorization. Patient is able to go back on the 14th.    Was an appointment offered for this call? No  If yes : Appointment type              Date    Preferred method for responding to this message: Telephone Call  What is your phone number ?   [phone]    If we cannot reach you directly, may we leave a detailed response at the number you provided? Yes    Can this message wait until your PCP/provider returns, if available today? YES, Provider is in    Yvonne Felice

## 2024-01-06 ENCOUNTER — MYC MEDICAL ADVICE (OUTPATIENT)
Dept: FAMILY MEDICINE | Facility: OTHER | Age: 49
End: 2024-01-06

## 2024-01-09 NOTE — TELEPHONE ENCOUNTER
Pt had an appt on 1/12 to follow-up with me   - I see it was cancelled.  I would like to see if I need to sign off on his S and A.,

## 2024-01-09 NOTE — TELEPHONE ENCOUNTER
Attempt # 1  Outcome: Left Message   Comment: lvm for pt to call back to schedule an appt with dr renner for a follow up. If calls back, please direct call to one of the [facility], thank you.

## 2024-01-15 ENCOUNTER — OFFICE VISIT (OUTPATIENT)
Dept: FAMILY MEDICINE | Facility: OTHER | Age: 49
End: 2024-01-15
Attending: FAMILY MEDICINE
Payer: COMMERCIAL

## 2024-01-15 VITALS
DIASTOLIC BLOOD PRESSURE: 70 MMHG | HEIGHT: 69 IN | HEART RATE: 96 BPM | WEIGHT: 238.2 LBS | TEMPERATURE: 100.2 F | BODY MASS INDEX: 35.28 KG/M2 | SYSTOLIC BLOOD PRESSURE: 130 MMHG | OXYGEN SATURATION: 94 %

## 2024-01-15 DIAGNOSIS — D86.0 PULMONARY SARCOIDOSIS (H): Primary | ICD-10-CM

## 2024-01-15 DIAGNOSIS — R06.09 DOE (DYSPNEA ON EXERTION): ICD-10-CM

## 2024-01-15 DIAGNOSIS — E10.9 TYPE 1 DIABETES MELLITUS WITHOUT COMPLICATION (H): ICD-10-CM

## 2024-01-15 DIAGNOSIS — B34.9 VIRAL SYNDROME: ICD-10-CM

## 2024-01-15 LAB
FLUAV RNA SPEC QL NAA+PROBE: NEGATIVE
FLUBV RNA RESP QL NAA+PROBE: NEGATIVE
GROUP A STREP BY PCR: NOT DETECTED
RSV RNA SPEC NAA+PROBE: NEGATIVE
SARS-COV-2 RNA RESP QL NAA+PROBE: NEGATIVE

## 2024-01-15 PROCEDURE — 87637 SARSCOV2&INF A&B&RSV AMP PRB: CPT | Performed by: FAMILY MEDICINE

## 2024-01-15 PROCEDURE — 99214 OFFICE O/P EST MOD 30 MIN: CPT | Performed by: FAMILY MEDICINE

## 2024-01-15 PROCEDURE — 87651 STREP A DNA AMP PROBE: CPT | Performed by: FAMILY MEDICINE

## 2024-01-15 ASSESSMENT — PAIN SCALES - GENERAL: PAINLEVEL: MODERATE PAIN (5)

## 2024-01-15 NOTE — PROGRESS NOTES
"  Assessment & Plan     Pulmonary sarcoidosis (H24)  Improving significantly. 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   RTW given to start on Wednesday 17th - continue with following Pulmonary's lead     MCCONNELL (dyspnea on exertion)  Much improved. Up to at least 75% of baseline. RTW given.     Viral syndrome  Tests negative. Seems viral. NO work for  2 more days then release.   - Group A Streptococcus PCR Throat Swab  - Symptomatic Influenza A/B, RSV, & SARS-CoV2 PCR (COVID-19) Nose    Type 1 diabetes mellitus without complication (H)  BS under still good control even with high dose steroids.   Follow-up in Feb.           BMI:   Estimated body mass index is 35.18 kg/m  as calculated from the following:    Height as of this encounter: 1.753 m (5' 9\").    Weight as of this encounter: 108 kg (238 lb 3.2 oz).           No follow-ups on file.    Selwyn Ashraf MD  [facility] - [place]EE[place] Rios is a 48 year old, presenting for the following health issues:  RECHECK        1/15/2024     7:56 AM   Additional Questions   Roomed by Beatriz HYMAN LPN   Accompanied by self       HPI     Concern - Return to work  Onset: 6 weeks ago   Description: respiratory complications  Intensity: moderate  Progression of Symptoms:  improving and worsening, due to recent cold  Accompanying Signs & Symptoms: sore throat, cough, ache, chest pain  Previous history of similar problem: none  Precipitating factors:        Worsened by: cold weather  Alleviating factors:        Improved by: none  Therapies tried and outcome: Nyquil didn't work.     Seeing pulmonology  Started on Steroids - 40mg daily -- has been on for 6 weeks   Pt is up to 75% of normal till got ill this last 2 days   Has low grade fever. Felt worse with f/c last night   Have had some covid and flu shot    Steroids has increase sugars but keeping up with pump        Review of Systems   Constitutional, HEENT, cardiovascular, " "pulmonary, gi and gu systems are negative, except as otherwise noted.      Objective    /70 (BP Location: Left arm, Patient Position: Sitting, Cuff Size: Adult Large)   Pulse 96   Temp 100.2  F (37.9  C) (Tympanic)   Ht 1.753 m (5' 9\")   Wt 108 kg (238 lb 3.2 oz)   SpO2 94%   BMI 35.18 kg/m    Body mass index is 35.18 kg/m .  Physical Exam   GENERAL: healthy, alert and no distress  EYES: Eyes grossly normal to inspection, PERRL and conjunctivae and sclerae normal  HENT: ear canals and TM's normal, nose and mouth without ulcers or lesions  NECK: no adenopathy, no asymmetry, masses, or scars and thyroid normal to palpation  RESP: lungs clear to auscultation - no rales, rhonchi or wheezes  CV: regular rate and rhythm, normal S1 S2, no S3 or S4, no murmur, click or rub, no peripheral edema and peripheral pulses strong  MS: no gross musculoskeletal defects noted, no edema    Results for orders placed or performed in visit on 01/15/24   Symptomatic Influenza A/B, RSV, & SARS-CoV2 PCR (COVID-19) Nose     Status: Normal    Specimen: Nose; Swab   Result Value Ref Range    Influenza A PCR Negative Negative    Influenza B PCR Negative Negative    RSV PCR Negative Negative    SARS CoV2 PCR Negative Negative    Narrative    Testing was performed using the Xpert Xpress CoV2/Flu/RSV Assay on the [employer] GeneXpert Instrument. This test should be ordered for the detection of SARS-CoV-2, influenza, and RSV viruses in individuals who meet clinical and/or epidemiological criteria. Test performance is unknown in asymptomatic patients. This test is for in vitro diagnostic use under the FDA EUA for laboratories certified under CLIA to perform high or moderate complexity testing. This test has not been FDA cleared or approved. A negative result does not rule out the presence of PCR inhibitors in the specimen or target RNA in concentration below the limit of detection for the assay. If only one viral target is positive but " coinfection with multiple targets is suspected, the sample should be re-tested with another FDA cleared, approved, or authorized test, if coinfection would change clinical management. This test was validated by the [facility]. These laboratories are certified under the Clinical Laboratory Improvement Amendments of 1988 (CLIA-88) as qualified to perform high complexity laboratory testing.   Group A Streptococcus PCR Throat Swab     Status: Normal    Specimen: Throat; Swab   Result Value Ref Range    Group A strep by PCR Not Detected Not Detected    Narrative    The Xpert Xpress Strep A test, performed on the [employer], is a rapid, qualitative in vitro diagnostic test for the detection of Streptococcus pyogenes (Group A ß-hemolytic Streptococcus, Strep A) in throat swab specimens from patients with signs and symptoms of pharyngitis. The Xpert Xpress Strep A test can be used as an aid in the diagnosis of Group A Streptococcal pharyngitis. The assay is not intended to monitor treatment for Group A Streptococcus infections. The Xpert Xpress Strep A test utilizes an automated real-time polymerase chain reaction (PCR) to detect Streptococcus pyogenes DNA.

## 2024-01-15 NOTE — LETTER
January 15, 2024      Yaya Mg  [address] [address]        To Whom It May Concern:    Yaya Mg was seen in our clinic. He may return to work after prolonged S and A to start work on 1/17/2024 without restrictions.      Sincerely,        Selwyn Ashraf MD

## 2024-01-18 ENCOUNTER — HOSPITAL ENCOUNTER (EMERGENCY)
Facility: HOSPITAL | Age: 49
Discharge: HOME | End: 2024-01-18
Payer: COMMERCIAL

## 2024-01-18 ENCOUNTER — MYC MEDICAL ADVICE (OUTPATIENT)
Dept: FAMILY MEDICINE | Facility: OTHER | Age: 49
End: 2024-01-18

## 2024-01-18 DIAGNOSIS — J18.0 BRONCHOPNEUMONIA: Primary | ICD-10-CM

## 2024-01-18 PROCEDURE — 999N000104 HC STATISTIC NO CHARGE

## 2024-01-18 RX ORDER — AZITHROMYCIN 250 MG/1
TABLET, FILM COATED ORAL
Qty: 6 TABLET | Refills: 0 | Status: SHIPPED | OUTPATIENT
Start: 2024-01-18

## 2024-01-18 RX ORDER — CEFUROXIME AXETIL 500 MG/1
500 TABLET ORAL 2 TIMES DAILY
Qty: 14 TABLET | Refills: 0 | Status: ON HOLD | OUTPATIENT
Start: 2024-01-18 | End: 2024-05-28

## 2024-01-24 DIAGNOSIS — F41.8 DEPRESSION WITH ANXIETY: ICD-10-CM

## 2024-01-24 RX ORDER — ESCITALOPRAM OXALATE 20 MG/1
20 TABLET ORAL DAILY
Qty: 90 TABLET | Refills: 3 | Status: SHIPPED | OUTPATIENT
Start: 2024-01-24

## 2024-01-24 NOTE — TELEPHONE ENCOUNTER
Lexapro      Last Written Prescription Date:  4/3/23  Last Fill Quantity: 90,   # refills: 2  Last Office Visit: 1/15/24  Future Office visit:    Next 5 appointments (look out 90 days)      Feb 13, 2024  9:45 AM  (Arrive by 9:30 AM)  SHORT with Selwyn Ashraf MD  [facility] [place] ([facility] [place] ) [address]  [place] [address]  [phone]             Routing refill request to provider for review/approval because:

## 2024-02-03 DIAGNOSIS — E10.9 TYPE 1 DIABETES MELLITUS WITHOUT COMPLICATION (H): ICD-10-CM

## 2024-02-04 ENCOUNTER — MYC MEDICAL ADVICE (OUTPATIENT)
Dept: FAMILY MEDICINE | Facility: OTHER | Age: 49
End: 2024-02-04

## 2024-02-04 DIAGNOSIS — E10.9 TYPE 1 DIABETES MELLITUS WITHOUT COMPLICATION (H): ICD-10-CM

## 2024-02-05 ENCOUNTER — MYC MEDICAL ADVICE (OUTPATIENT)
Dept: FAMILY MEDICINE | Facility: OTHER | Age: 49
End: 2024-02-05

## 2024-02-05 DIAGNOSIS — Z12.11 SCREENING FOR COLON CANCER: Primary | ICD-10-CM

## 2024-02-05 DIAGNOSIS — Z86.0100 HISTORY OF COLONIC POLYPS: ICD-10-CM

## 2024-02-05 NOTE — TELEPHONE ENCOUNTER
Dexcom      Last Written Prescription Date:  11.26.23  Last Fill Quantity: #9,   # refills: 0  Last Office Visit: 1.15.24  Future Office visit:    Next 5 appointments (look out 90 days)      Feb 13, 2024  9:45 AM  (Arrive by 9:30 AM)  SHORT with Selwyn Ashraf MD  [facility] ([facility] ) [address] [address]  [phone]             Routing refill request to provider for review/approval because:  Drug not on the [facility], P or [facility] refill protocol or controlled substance

## 2024-02-06 RX ORDER — PROCHLORPERAZINE 25 MG/1
SUPPOSITORY RECTAL
Qty: 9 EACH | Refills: 4 | Status: SHIPPED | OUTPATIENT
Start: 2024-02-06

## 2024-02-06 RX ORDER — PROCHLORPERAZINE 25 MG/1
1 SUPPOSITORY RECTAL
Qty: 9 EACH | Refills: 4 | OUTPATIENT
Start: 2024-02-06

## 2024-02-06 NOTE — TELEPHONE ENCOUNTER
Pended up GI consult for colonoscopy per colonoscopy note form 02/02/2023: Because of the type of polyps that were in the number he should have a colonoscopy in 1 year.   Please review and sign if appropriate.

## 2024-02-09 ENCOUNTER — PATIENT OUTREACH (OUTPATIENT)
Dept: GASTROENTEROLOGY | Facility: CLINIC | Age: 49
End: 2024-02-09
Payer: COMMERCIAL

## 2024-02-09 ENCOUNTER — TRANSFERRED RECORDS (OUTPATIENT)
Dept: HEALTH INFORMATION MANAGEMENT | Facility: CLINIC | Age: 49
End: 2024-02-09
Payer: COMMERCIAL

## 2024-02-13 ENCOUNTER — TRANSFERRED RECORDS (OUTPATIENT)
Dept: HEALTH INFORMATION MANAGEMENT | Facility: CLINIC | Age: 49
End: 2024-02-13
Payer: COMMERCIAL

## 2024-02-15 ENCOUNTER — OFFICE VISIT (OUTPATIENT)
Dept: FAMILY MEDICINE | Facility: OTHER | Age: 49
End: 2024-02-15
Attending: FAMILY MEDICINE
Payer: COMMERCIAL

## 2024-02-15 ENCOUNTER — LAB (OUTPATIENT)
Dept: LAB | Facility: OTHER | Age: 49
End: 2024-02-15
Attending: FAMILY MEDICINE
Payer: COMMERCIAL

## 2024-02-15 VITALS
OXYGEN SATURATION: 97 % | BODY MASS INDEX: 33.71 KG/M2 | SYSTOLIC BLOOD PRESSURE: 112 MMHG | HEART RATE: 90 BPM | TEMPERATURE: 97.4 F | WEIGHT: 228.3 LBS | DIASTOLIC BLOOD PRESSURE: 72 MMHG

## 2024-02-15 DIAGNOSIS — E78.2 MIXED HYPERLIPIDEMIA: ICD-10-CM

## 2024-02-15 DIAGNOSIS — E10.9 TYPE 1 DIABETES MELLITUS WITHOUT COMPLICATION (H): Primary | ICD-10-CM

## 2024-02-15 DIAGNOSIS — Z79.52 CURRENT USE OF STEROID MEDICATION: ICD-10-CM

## 2024-02-15 DIAGNOSIS — Z86.0100 HISTORY OF COLONIC POLYPS: ICD-10-CM

## 2024-02-15 DIAGNOSIS — D86.0 PULMONARY SARCOIDOSIS (H): ICD-10-CM

## 2024-02-15 DIAGNOSIS — E10.9 TYPE 1 DIABETES MELLITUS WITHOUT COMPLICATION (H): ICD-10-CM

## 2024-02-15 PROBLEM — J45.991 COUGH VARIANT ASTHMA: Status: RESOLVED | Noted: 2022-11-10 | Resolved: 2024-02-15

## 2024-02-15 LAB
ALBUMIN SERPL BCG-MCNC: 4.6 G/DL (ref 3.5–5.2)
ALP SERPL-CCNC: 95 U/L (ref 40–150)
ALT SERPL W P-5'-P-CCNC: 45 U/L (ref 0–70)
ANION GAP SERPL CALCULATED.3IONS-SCNC: 12 MMOL/L (ref 7–15)
AST SERPL W P-5'-P-CCNC: 23 U/L (ref 0–45)
BASOPHILS # BLD AUTO: 0 10E3/UL (ref 0–0.2)
BASOPHILS NFR BLD AUTO: 0 %
BILIRUB SERPL-MCNC: 1.2 MG/DL
BUN SERPL-MCNC: 12.7 MG/DL (ref 6–20)
CALCIUM SERPL-MCNC: 9.3 MG/DL (ref 8.6–10)
CHLORIDE SERPL-SCNC: 101 MMOL/L (ref 98–107)
CHOLEST SERPL-MCNC: 183 MG/DL
CREAT SERPL-MCNC: 0.82 MG/DL (ref 0.67–1.17)
CREAT UR-MCNC: 206.3 MG/DL
DEPRECATED HCO3 PLAS-SCNC: 25 MMOL/L (ref 22–29)
EGFRCR SERPLBLD CKD-EPI 2021: >90 ML/MIN/1.73M2
EOSINOPHIL # BLD AUTO: 0.1 10E3/UL (ref 0–0.7)
EOSINOPHIL NFR BLD AUTO: 1 %
ERYTHROCYTE [DISTWIDTH] IN BLOOD BY AUTOMATED COUNT: 15.8 % (ref 10–15)
EST. AVERAGE GLUCOSE BLD GHB EST-MCNC: 200 MG/DL
FASTING STATUS PATIENT QL REPORTED: NO
GLUCOSE SERPL-MCNC: 106 MG/DL (ref 70–99)
HBA1C MFR BLD: 8.6 %
HCT VFR BLD AUTO: 41.3 % (ref 40–53)
HDLC SERPL-MCNC: 76 MG/DL
HGB BLD-MCNC: 15.2 G/DL (ref 13.3–17.7)
IMM GRANULOCYTES # BLD: 0.1 10E3/UL
IMM GRANULOCYTES NFR BLD: 1 %
LDLC SERPL CALC-MCNC: 75 MG/DL
LYMPHOCYTES # BLD AUTO: 2.9 10E3/UL (ref 0.8–5.3)
LYMPHOCYTES NFR BLD AUTO: 27 %
MCH RBC QN AUTO: 30.1 PG (ref 26.5–33)
MCHC RBC AUTO-ENTMCNC: 36.8 G/DL (ref 31.5–36.5)
MCV RBC AUTO: 82 FL (ref 78–100)
MICROALBUMIN UR-MCNC: <12 MG/L
MICROALBUMIN/CREAT UR: NORMAL MG/G{CREAT}
MONOCYTES # BLD AUTO: 1.2 10E3/UL (ref 0–1.3)
MONOCYTES NFR BLD AUTO: 11 %
NEUTROPHILS # BLD AUTO: 6.4 10E3/UL (ref 1.6–8.3)
NEUTROPHILS NFR BLD AUTO: 60 %
NONHDLC SERPL-MCNC: 107 MG/DL
NRBC # BLD AUTO: 0 10E3/UL
NRBC BLD AUTO-RTO: 0 /100
PLATELET # BLD AUTO: 198 10E3/UL (ref 150–450)
POTASSIUM SERPL-SCNC: 3.9 MMOL/L (ref 3.4–5.3)
PROT SERPL-MCNC: 6.5 G/DL (ref 6.4–8.3)
RBC # BLD AUTO: 5.05 10E6/UL (ref 4.4–5.9)
SODIUM SERPL-SCNC: 138 MMOL/L (ref 135–145)
TRIGL SERPL-MCNC: 161 MG/DL
TSH SERPL DL<=0.005 MIU/L-ACNC: 0.71 UIU/ML (ref 0.3–4.2)
WBC # BLD AUTO: 10.8 10E3/UL (ref 4–11)

## 2024-02-15 PROCEDURE — 83036 HEMOGLOBIN GLYCOSYLATED A1C: CPT

## 2024-02-15 PROCEDURE — 99214 OFFICE O/P EST MOD 30 MIN: CPT | Performed by: FAMILY MEDICINE

## 2024-02-15 PROCEDURE — 80061 LIPID PANEL: CPT

## 2024-02-15 PROCEDURE — 82043 UR ALBUMIN QUANTITATIVE: CPT

## 2024-02-15 PROCEDURE — 36415 COLL VENOUS BLD VENIPUNCTURE: CPT

## 2024-02-15 PROCEDURE — 82570 ASSAY OF URINE CREATININE: CPT

## 2024-02-15 PROCEDURE — 80050 GENERAL HEALTH PANEL: CPT

## 2024-02-15 RX ORDER — PREDNISONE 10 MG/1
TABLET ORAL
COMMUNITY
Start: 2024-02-13

## 2024-02-15 ASSESSMENT — ASTHMA QUESTIONNAIRES
QUESTION_3 LAST FOUR WEEKS HOW OFTEN DID YOUR ASTHMA SYMPTOMS (WHEEZING, COUGHING, SHORTNESS OF BREATH, CHEST TIGHTNESS OR PAIN) WAKE YOU UP AT NIGHT OR EARLIER THAN USUAL IN THE MORNING: ONCE OR TWICE
QUESTION_4 LAST FOUR WEEKS HOW OFTEN HAVE YOU USED YOUR RESCUE INHALER OR NEBULIZER MEDICATION (SUCH AS ALBUTEROL): NOT AT ALL
QUESTION_5 LAST FOUR WEEKS HOW WOULD YOU RATE YOUR ASTHMA CONTROL: WELL CONTROLLED
ACT_TOTALSCORE: 20
ACT_TOTALSCORE: 20
QUESTION_1 LAST FOUR WEEKS HOW MUCH OF THE TIME DID YOUR ASTHMA KEEP YOU FROM GETTING AS MUCH DONE AT WORK, SCHOOL OR AT HOME: NONE OF THE TIME
QUESTION_2 LAST FOUR WEEKS HOW OFTEN HAVE YOU HAD SHORTNESS OF BREATH: ONCE A DAY

## 2024-02-15 ASSESSMENT — PAIN SCALES - GENERAL: PAINLEVEL: NO PAIN (0)

## 2024-02-15 NOTE — PROGRESS NOTES
Assessment & Plan     (E10.9) Type 1 diabetes mellitus without complication (H)  (primary encounter diagnosis)  Comment: A1c elevated today likely due to high dose prednisone. All other labs unremarkable. Pt currently starting prednisone taper for sarcoidosis. Pt has continuous blood glucose monitor. Will continue current insulin regimen, and encouraged close glucose monitoring. Will follow up in 3-4 months.   Plan: Albumin Random Urine Quantitative with Creat         Ratio, Hemoglobin A1c, Lipid Profile, CBC with         Platelets & Differential, TSH    (D86.0) Pulmonary sarcoidosis (H24)  Comment: Pt had PFT earlier this week and stated the lungs are doing better. He is following with pulmonary at [facility]. They started him on predisone taper, see HPI and also started advair. Will also get repeat PFTs in April.  Pt complains of some shortness of breath and coughing spells with activity. Continue prednisone taper and advair.-- SEE TAPER ABOVE AND BELOW  Plan: Prednisone taper per pulmonology. Advair.     (E78.2) Mixed hyperlipidemia  Comment: Lipid panel unremarkable. Continue atorvastatin 20mg. Follow up lipid panel in 1 year.   Plan: Lipid Profile, CBC with Platelets &         Differential, Comprehensive metabolic panel        Atorvastatin 20mg daily    (Z79.52) Current use of steroid medication  Comment: Pt currently on prednisone 30mg daily. Pulmonary following for prednisone taper. Prednisone taper: 30mg 2weeks, 20 2 weeks, 15 2 weeks, 10 see in  pulmonary clinic [facility].   Plan: Prednisone taper as per pulmonology.     (Z86.010) History of colonic polyps  Comment: Pt currently on prednisone 30mg daily. Colonoscopy 1year ago showed villious polyps. Will defer on colonoscopy at this time until off steroids.   Plan: Colonoscopy once off steroids.       Assessment and plan discussed with patient/guardian who agrees and verbalizes understanding. All questions were answered.               BMI  Estimated body  "mass index is 33.71 kg/m  as calculated from the following:    Height as of 1/15/24: 1.753 m (5' 9\").    Weight as of this encounter: 103.6 kg (228 lb 4.8 oz).             No follow-ups on file.    Subjective   Gabriel is a 48 year old, presenting for the following health issues:  Diabetes and Lipids        2/15/2024     8:22 AM   Additional Questions   Roomed by Mack Perla   Accompanied by Self         2/15/2024     8:22 AM   Patient Reported Additional Medications   Patient reports taking the following new medications Adviar Inhaler- in process of getting it     HPI   Sarcoid  - some shortness of breath  - less coughing fits since starting prednisone, still usually when exerting yourself (becomes very diaphoretic). No sputum production.   - PFT at [facility] this week, better lung capacity (another PFT in April)  - denies any congestion, no fevers, diarrhea, constipation.   - Prednisone taper: 30mg 2weeks, 20 2 weeks, 15 2 weeks, 10 see in clinic, also start advair     DM1  - constant glucose monitor, pump wasn't working right was checking himself   - couple hypoglycemic episodes in last week, handful of hyperglycemic episodes in last week  - no new numbness/tingling in feet, gabapentin helps at night   - no vision changes          Diabetes Follow-up    How often are you checking your blood sugar? Continuous glucose monitor  What time of day are you checking your blood sugars (select all that apply)?   Has a continuous monitor   Have you had any blood sugars above 200?  Yes   Have you had any blood sugars below 70?  Yes   What symptoms do you notice when your blood sugar is low?  Confusion and sweaty   What concerns do you have today about your diabetes? None   Do you have any of these symptoms? (Select all that apply)  Weight loss      BP Readings from Last 2 Encounters:   02/15/24 112/72   01/15/24 130/70     Hemoglobin A1C (%)   Date Value   10/03/2023 7.2 (H)   04/12/2023 7.1 (H)     LDL Cholesterol Calculated " (mg/dL)   Date Value   04/12/2023 59   04/14/2022 57             Hyperlipidemia Follow-Up    Are you regularly taking any medication or supplement to lower your cholesterol?   Yes- Lipitor   Are you having muscle aches or other side effects that you think could be caused by your cholesterol lowering medication?  No          Review of Systems  Constitutional, HEENT, cardiovascular, pulmonary, gi and gu systems are negative, except as otherwise noted.      Objective    /72 (BP Location: Right arm, Patient Position: Sitting, Cuff Size: Adult Large)   Pulse 90   Temp 97.4  F (36.3  C) (Tympanic)   Wt 103.6 kg (228 lb 4.8 oz)   SpO2 97%   BMI 33.71 kg/m    Body mass index is 33.71 kg/m .  Physical Exam  Constitutional:       General: He is not in acute distress.     Appearance: Normal appearance. He is not ill-appearing or toxic-appearing.   HENT:      Head: Normocephalic.      Right Ear: Tympanic membrane and ear canal normal.      Left Ear: Tympanic membrane and ear canal normal.      Nose: Nose normal. No congestion or rhinorrhea.      Right Sinus: No maxillary sinus tenderness or frontal sinus tenderness.      Left Sinus: No maxillary sinus tenderness or frontal sinus tenderness.      Mouth/Throat:      Mouth: Mucous membranes are moist.   Eyes:      Pupils: Pupils are equal, round, and reactive to light.   Cardiovascular:      Rate and Rhythm: Normal rate and regular rhythm.      Pulses: Normal pulses.      Heart sounds: No murmur heard.  Pulmonary:      Effort: Pulmonary effort is normal. No respiratory distress.      Breath sounds: No stridor. No wheezing or rhonchi.   Abdominal:      General: Bowel sounds are normal. There is no distension.      Palpations: Abdomen is soft.      Tenderness: There is no abdominal tenderness. There is no guarding.   Musculoskeletal:         General: No swelling.      Cervical back: No rigidity.      Right lower leg: No edema.      Left lower leg: No edema.    Lymphadenopathy:      Cervical: No cervical adenopathy.   Skin:     General: Skin is warm and dry.   Neurological:      General: No focal deficit present.      Mental Status: He is alert and oriented to person, place, and time.        Results for orders placed or performed in visit on 02/15/24   Albumin Random Urine Quantitative with Creat Ratio     Status: None   Result Value Ref [place]    Creatinine Urine mg/dL 206.3 mg/dL    Albumin Urine mg/L <12.0 mg/L    Albumin Urine mg/g Cr     Hemoglobin A1c     Status: Abnormal   Result Value Ref [place]    Estimated Average Glucose 200 mg/dL    Hemoglobin A1C 8.6 (H) <5.7 %   Lipid Profile     Status: Abnormal   Result Value Ref [place]    Cholesterol 183 <200 mg/dL    Triglycerides 161 (H) <150 mg/dL    Direct Measure HDL 76 >=40 mg/dL    LDL Cholesterol Calculated 75 <=100 mg/dL    Non HDL Cholesterol 107 <130 mg/dL    Patient Fasting > 8hrs? No     Narrative    Cholesterol  Desirable:  <200 mg/dL    Triglycerides  Normal:  Less than 150 mg/dL  Borderline High:  150-199 mg/dL  High:  200-499 mg/dL  Very High:  Greater than or equal to 500 mg/dL    Direct Measure HDL  Female:  Greater than or equal to 50 mg/dL   Male:  Greater than or equal to 40 mg/dL    LDL Cholesterol  Desirable:  <100mg/dL  Above Desirable:  100-129 mg/dL   Borderline High:  130-159 mg/dL   High:  160-189 mg/dL   Very High:  >= 190 mg/dL    Non HDL Cholesterol  Desirable:  130 mg/dL  Above Desirable:  130-159 mg/dL  Borderline High:  160-189 mg/dL  High:  190-219 mg/dL  Very High:  Greater than or equal to 220 mg/dL   Comprehensive metabolic panel     Status: Abnormal   Result Value Ref [place]    Sodium 138 135 - 145 mmol/L    Potassium 3.9 3.4 - 5.3 mmol/L    Carbon Dioxide (CO2) 25 22 - 29 mmol/L    Anion Gap 12 7 - 15 mmol/L    Urea Nitrogen 12.7 6.0 - 20.0 mg/dL    Creatinine 0.82 0.67 - 1.17 mg/dL    GFR Estimate >90 >60 mL/min/1.73m2    Calcium 9.3 8.6 - 10.0 mg/dL    Chloride 101 98 - 107 mmol/L     Glucose 106 (H) 70 - 99 mg/dL    Alkaline Phosphatase 95 40 - 150 U/L    AST 23 0 - 45 U/L    ALT 45 0 - 70 U/L    Protein Total 6.5 6.4 - 8.3 g/dL    Albumin 4.6 3.5 - 5.2 g/dL    Bilirubin Total 1.2 <=1.2 mg/dL   TSH     Status: Normal   Result Value Ref [place]    TSH 0.71 0.30 - 4.20 uIU/mL   CBC with platelets and differential     Status: Abnormal   Result Value Ref [place]    WBC Count 10.8 4.0 - 11.0 10e3/uL    RBC Count 5.05 4.40 - 5.90 10e6/uL    Hemoglobin 15.2 13.3 - 17.7 g/dL    Hematocrit 41.3 40.0 - 53.0 %    MCV 82 78 - 100 fL    MCH 30.1 26.5 - 33.0 pg    MCHC 36.8 (H) 31.5 - 36.5 g/dL    RDW 15.8 (H) 10.0 - 15.0 %    Platelet Count 198 150 - 450 10e3/uL    % Neutrophils 60 %    % Lymphocytes 27 %    % Monocytes 11 %    % Eosinophils 1 %    % Basophils 0 %    % Immature Granulocytes 1 %    NRBCs per 100 WBC 0 <1 /100    Absolute Neutrophils 6.4 1.6 - 8.3 10e3/uL    Absolute Lymphocytes 2.9 0.8 - 5.3 10e3/uL    Absolute Monocytes 1.2 0.0 - 1.3 10e3/uL    Absolute Eosinophils 0.1 0.0 - 0.7 10e3/uL    Absolute Basophils 0.0 0.0 - 0.2 10e3/uL    Absolute Immature Granulocytes 0.1 <=0.4 10e3/uL    Absolute NRBCs 0.0 10e3/uL   CBC with Platelets & Differential     Status: Abnormal    Narrative    The following orders were created for panel order CBC with Platelets & Differential.  Procedure                               Abnormality         Status                     ---------                               -----------         ------                     CBC with platelets and d...[[id number]]  Abnormal            Final result                 Please view results for these tests on the individual orders.               Howard Nicole MS4  Dr. Selwyn Ashraf seen, obtained history, and examined patient and agrees with medical students documentation, assessment, and plan.        Signed Electronically by: Selwyn Ashraf MD

## 2024-04-25 ENCOUNTER — TRANSFERRED RECORDS (OUTPATIENT)
Dept: HEALTH INFORMATION MANAGEMENT | Facility: CLINIC | Age: 49
End: 2024-04-25
Payer: COMMERCIAL

## 2024-05-24 ENCOUNTER — HOSPITAL ENCOUNTER (INPATIENT)
Facility: HOSPITAL | Age: 49
LOS: 3 days | Discharge: HOME | End: 2024-05-28
Attending: EMERGENCY MEDICINE | Admitting: HOSPITALIST
Payer: COMMERCIAL

## 2024-05-24 DIAGNOSIS — R00.0 TACHYCARDIA: ICD-10-CM

## 2024-05-24 DIAGNOSIS — R11.2 NAUSEA AND VOMITING, UNSPECIFIED VOMITING TYPE: ICD-10-CM

## 2024-05-24 LAB
BASE EXCESS BLDV CALC-SCNC: -4.7 MMOL/L (ref -3–3)
HCO3 BLDV-SCNC: 21 MMOL/L (ref 21–28)
LACTATE SERPL-SCNC: 4.2 MMOL/L (ref 0.7–2)
O2/TOTAL GAS SETTING VFR VENT: 21 %
OXYHGB MFR BLDV: 53 % (ref 70–75)
PCO2 BLDV: 41 MM HG (ref 40–50)
PH BLDV: 7.32 [PH] (ref 7.32–7.43)
PO2 BLDV: 28 MM HG (ref 25–47)
SAO2 % BLDV: 54.6 % (ref 70–75)

## 2024-05-24 PROCEDURE — 82077 ASSAY SPEC XCP UR&BREATH IA: CPT | Performed by: EMERGENCY MEDICINE

## 2024-05-24 PROCEDURE — 82805 BLOOD GASES W/O2 SATURATION: CPT | Performed by: EMERGENCY MEDICINE

## 2024-05-24 PROCEDURE — 36415 COLL VENOUS BLD VENIPUNCTURE: CPT | Performed by: EMERGENCY MEDICINE

## 2024-05-24 PROCEDURE — 83690 ASSAY OF LIPASE: CPT | Performed by: EMERGENCY MEDICINE

## 2024-05-24 PROCEDURE — 96374 THER/PROPH/DIAG INJ IV PUSH: CPT

## 2024-05-24 PROCEDURE — 250N000011 HC RX IP 250 OP 636: Performed by: EMERGENCY MEDICINE

## 2024-05-24 PROCEDURE — 99291 CRITICAL CARE FIRST HOUR: CPT | Mod: 25

## 2024-05-24 PROCEDURE — 84075 ASSAY ALKALINE PHOSPHATASE: CPT | Performed by: EMERGENCY MEDICINE

## 2024-05-24 PROCEDURE — 99285 EMERGENCY DEPT VISIT HI MDM: CPT | Performed by: EMERGENCY MEDICINE

## 2024-05-24 PROCEDURE — 83605 ASSAY OF LACTIC ACID: CPT | Performed by: EMERGENCY MEDICINE

## 2024-05-24 PROCEDURE — 82010 KETONE BODYS QUAN: CPT | Performed by: EMERGENCY MEDICINE

## 2024-05-24 PROCEDURE — 258N000003 HC RX IP 258 OP 636: Performed by: EMERGENCY MEDICINE

## 2024-05-24 PROCEDURE — 85007 BL SMEAR W/DIFF WBC COUNT: CPT | Performed by: EMERGENCY MEDICINE

## 2024-05-24 PROCEDURE — 85025 COMPLETE CBC W/AUTO DIFF WBC: CPT | Performed by: EMERGENCY MEDICINE

## 2024-05-24 PROCEDURE — 96361 HYDRATE IV INFUSION ADD-ON: CPT

## 2024-05-24 RX ORDER — ONDANSETRON 2 MG/ML
4 INJECTION INTRAMUSCULAR; INTRAVENOUS ONCE
Status: COMPLETED | OUTPATIENT
Start: 2024-05-24 | End: 2024-05-24

## 2024-05-24 RX ADMIN — SODIUM CHLORIDE 1000 ML: 9 INJECTION, SOLUTION INTRAVENOUS at 23:38

## 2024-05-24 RX ADMIN — ONDANSETRON 4 MG: 2 INJECTION INTRAMUSCULAR; INTRAVENOUS at 23:41

## 2024-05-24 ASSESSMENT — COLUMBIA-SUICIDE SEVERITY RATING SCALE - C-SSRS
6. HAVE YOU EVER DONE ANYTHING, STARTED TO DO ANYTHING, OR PREPARED TO DO ANYTHING TO END YOUR LIFE?: NO
1. IN THE PAST MONTH, HAVE YOU WISHED YOU WERE DEAD OR WISHED YOU COULD GO TO SLEEP AND NOT WAKE UP?: NO
2. HAVE YOU ACTUALLY HAD ANY THOUGHTS OF KILLING YOURSELF IN THE PAST MONTH?: NO

## 2024-05-24 NOTE — LETTER
[facility]  [address]  [place] [address]  Phone: [phone]  Fax: [phone]    May 26, 2024        Yaya Mg  [address] [place] [address]  [place] [address]          To whom it may concern:    RE: Yaya Mg    Please excuse Yaya for missing work from May 24 -May 26 2024 as he was under my care.   He will need clearance from his primary care doctor before returning for work.       Sincerely,      [facility]

## 2024-05-25 ENCOUNTER — APPOINTMENT (OUTPATIENT)
Dept: CT IMAGING | Facility: HOSPITAL | Age: 49
End: 2024-05-25
Attending: EMERGENCY MEDICINE
Payer: COMMERCIAL

## 2024-05-25 ENCOUNTER — APPOINTMENT (OUTPATIENT)
Dept: GENERAL RADIOLOGY | Facility: HOSPITAL | Age: 49
End: 2024-05-25
Attending: HOSPITALIST
Payer: COMMERCIAL

## 2024-05-25 PROBLEM — R00.0 TACHYCARDIA: Status: ACTIVE | Noted: 2024-05-25

## 2024-05-25 PROBLEM — E11.10 DKA (DIABETIC KETOACIDOSIS) (H): Status: ACTIVE | Noted: 2024-05-25

## 2024-05-25 PROBLEM — R11.2 NAUSEA AND VOMITING, UNSPECIFIED VOMITING TYPE: Status: ACTIVE | Noted: 2024-05-25

## 2024-05-25 LAB
ALBUMIN SERPL BCG-MCNC: 3.8 G/DL (ref 3.5–5.2)
ALBUMIN SERPL BCG-MCNC: 4.8 G/DL (ref 3.5–5.2)
ALBUMIN UR-MCNC: 10 MG/DL
ALP SERPL-CCNC: 122 U/L (ref 40–150)
ALP SERPL-CCNC: 89 U/L (ref 40–150)
ALT SERPL W P-5'-P-CCNC: 30 U/L (ref 0–70)
ALT SERPL W P-5'-P-CCNC: 39 U/L (ref 0–70)
AMPHETAMINES UR QL SCN: ABNORMAL
ANION GAP SERPL CALCULATED.3IONS-SCNC: 12 MMOL/L (ref 7–15)
ANION GAP SERPL CALCULATED.3IONS-SCNC: 22 MMOL/L (ref 7–15)
ANION GAP SERPL CALCULATED.3IONS-SCNC: 9 MMOL/L (ref 7–15)
APPEARANCE UR: CLEAR
AST SERPL W P-5'-P-CCNC: 18 U/L (ref 0–45)
AST SERPL W P-5'-P-CCNC: 25 U/L (ref 0–45)
B-OH-BUTYR SERPL-SCNC: 0.9 MMOL/L
B-OH-BUTYR SERPL-SCNC: 1.7 MMOL/L
B-OH-BUTYR SERPL-SCNC: 2.2 MMOL/L
B-OH-BUTYR SERPL-SCNC: 3.4 MMOL/L
B-OH-BUTYR SERPL-SCNC: 3.7 MMOL/L
BARBITURATES UR QL SCN: ABNORMAL
BASE EXCESS BLDV CALC-SCNC: -2.2 MMOL/L (ref -3–3)
BASOPHILS # BLD MANUAL: 0 10E3/UL (ref 0–0.2)
BASOPHILS # BLD MANUAL: 0.2 10E3/UL (ref 0–0.2)
BASOPHILS NFR BLD MANUAL: 0 %
BASOPHILS NFR BLD MANUAL: 1 %
BENZODIAZ UR QL SCN: ABNORMAL
BILIRUB SERPL-MCNC: 1.5 MG/DL
BILIRUB SERPL-MCNC: 2.3 MG/DL
BILIRUB UR QL STRIP: NEGATIVE
BUN SERPL-MCNC: 16 MG/DL (ref 6–20)
BUN SERPL-MCNC: 17.5 MG/DL (ref 6–20)
BUN SERPL-MCNC: 19.2 MG/DL (ref 6–20)
BUN SERPL-MCNC: 21.8 MG/DL (ref 6–20)
BUN SERPL-MCNC: 24.8 MG/DL (ref 6–20)
BZE UR QL SCN: ABNORMAL
C PNEUM DNA SPEC QL NAA+PROBE: NOT DETECTED
CALCIUM SERPL-MCNC: 10.2 MG/DL (ref 8.6–10)
CALCIUM SERPL-MCNC: 8.1 MG/DL (ref 8.6–10)
CALCIUM SERPL-MCNC: 8.3 MG/DL (ref 8.6–10)
CALCIUM SERPL-MCNC: 8.4 MG/DL (ref 8.6–10)
CALCIUM SERPL-MCNC: 8.6 MG/DL (ref 8.6–10)
CANNABINOIDS UR QL SCN: ABNORMAL
CHLORIDE SERPL-SCNC: 102 MMOL/L (ref 98–107)
CHLORIDE SERPL-SCNC: 103 MMOL/L (ref 98–107)
CHLORIDE SERPL-SCNC: 105 MMOL/L (ref 98–107)
CHLORIDE SERPL-SCNC: 89 MMOL/L (ref 98–107)
CHLORIDE SERPL-SCNC: 97 MMOL/L (ref 98–107)
COLOR UR AUTO: ABNORMAL
CREAT SERPL-MCNC: 0.75 MG/DL (ref 0.67–1.17)
CREAT SERPL-MCNC: 0.8 MG/DL (ref 0.67–1.17)
CREAT SERPL-MCNC: 0.87 MG/DL (ref 0.67–1.17)
CREAT SERPL-MCNC: 1.01 MG/DL (ref 0.67–1.17)
CREAT SERPL-MCNC: 1.17 MG/DL (ref 0.67–1.17)
CRP SERPL-MCNC: 31.37 MG/L
DEPRECATED HCO3 PLAS-SCNC: 19 MMOL/L (ref 22–29)
DEPRECATED HCO3 PLAS-SCNC: 19 MMOL/L (ref 22–29)
DEPRECATED HCO3 PLAS-SCNC: 20 MMOL/L (ref 22–29)
DEPRECATED HCO3 PLAS-SCNC: 21 MMOL/L (ref 22–29)
DEPRECATED HCO3 PLAS-SCNC: 24 MMOL/L (ref 22–29)
EGFRCR SERPLBLD CKD-EPI 2021: 76 ML/MIN/1.73M2
EGFRCR SERPLBLD CKD-EPI 2021: >90 ML/MIN/1.73M2
EOSINOPHIL # BLD MANUAL: 0 10E3/UL (ref 0–0.7)
EOSINOPHIL # BLD MANUAL: 0 10E3/UL (ref 0–0.7)
EOSINOPHIL NFR BLD MANUAL: 0 %
EOSINOPHIL NFR BLD MANUAL: 0 %
ERYTHROCYTE [DISTWIDTH] IN BLOOD BY AUTOMATED COUNT: 13.3 % (ref 10–15)
ERYTHROCYTE [DISTWIDTH] IN BLOOD BY AUTOMATED COUNT: 13.8 % (ref 10–15)
EST. AVERAGE GLUCOSE BLD GHB EST-MCNC: 157 MG/DL
ETHANOL SERPL-MCNC: <0.01 G/DL
FENTANYL UR QL: ABNORMAL
FLUAV H1 2009 PAND RNA SPEC QL NAA+PROBE: NOT DETECTED
FLUAV H1 RNA SPEC QL NAA+PROBE: NOT DETECTED
FLUAV H3 RNA SPEC QL NAA+PROBE: NOT DETECTED
FLUAV RNA SPEC QL NAA+PROBE: NOT DETECTED
FLUBV RNA SPEC QL NAA+PROBE: NOT DETECTED
GLUCOSE BLDC GLUCOMTR-MCNC: 217 MG/DL (ref 70–99)
GLUCOSE BLDC GLUCOMTR-MCNC: 237 MG/DL (ref 70–99)
GLUCOSE BLDC GLUCOMTR-MCNC: 266 MG/DL (ref 70–99)
GLUCOSE BLDC GLUCOMTR-MCNC: 286 MG/DL (ref 70–99)
GLUCOSE BLDC GLUCOMTR-MCNC: 316 MG/DL (ref 70–99)
GLUCOSE BLDC GLUCOMTR-MCNC: 338 MG/DL (ref 70–99)
GLUCOSE SERPL-MCNC: 265 MG/DL (ref 70–99)
GLUCOSE SERPL-MCNC: 295 MG/DL (ref 70–99)
GLUCOSE SERPL-MCNC: 310 MG/DL (ref 70–99)
GLUCOSE SERPL-MCNC: 360 MG/DL (ref 70–99)
GLUCOSE SERPL-MCNC: 499 MG/DL (ref 70–99)
GLUCOSE UR STRIP-MCNC: >1000 MG/DL
HADV DNA SPEC QL NAA+PROBE: NOT DETECTED
HBA1C MFR BLD: 7.1 %
HCO3 BLDV-SCNC: 24 MMOL/L (ref 21–28)
HCOV PNL SPEC NAA+PROBE: NOT DETECTED
HCT VFR BLD AUTO: 35.4 % (ref 40–53)
HCT VFR BLD AUTO: 41.5 % (ref 40–53)
HGB BLD-MCNC: 12.7 G/DL (ref 13.3–17.7)
HGB BLD-MCNC: 15.1 G/DL (ref 13.3–17.7)
HGB UR QL STRIP: NEGATIVE
HMPV RNA SPEC QL NAA+PROBE: NOT DETECTED
HOLD SPECIMEN: NORMAL
HPIV1 RNA SPEC QL NAA+PROBE: NOT DETECTED
HPIV2 RNA SPEC QL NAA+PROBE: NOT DETECTED
HPIV3 RNA SPEC QL NAA+PROBE: NOT DETECTED
HPIV4 RNA SPEC QL NAA+PROBE: NOT DETECTED
HYALINE CASTS: 8 /LPF
KETONES UR STRIP-MCNC: 80 MG/DL
LACTATE SERPL-SCNC: 1.1 MMOL/L (ref 0.7–2)
LACTATE SERPL-SCNC: 1.3 MMOL/L (ref 0.7–2)
LACTATE SERPL-SCNC: 2 MMOL/L (ref 0.7–2)
LEUKOCYTE ESTERASE UR QL STRIP: NEGATIVE
LIPASE SERPL-CCNC: 7 U/L (ref 13–60)
LIPASE SERPL-CCNC: 8 U/L (ref 13–60)
LYMPHOCYTES # BLD MANUAL: 1.9 10E3/UL (ref 0.8–5.3)
LYMPHOCYTES # BLD MANUAL: 2.5 10E3/UL (ref 0.8–5.3)
LYMPHOCYTES NFR BLD MANUAL: 10 %
LYMPHOCYTES NFR BLD MANUAL: 17 %
M PNEUMO DNA SPEC QL NAA+PROBE: NOT DETECTED
MCH RBC QN AUTO: 31.3 PG (ref 26.5–33)
MCH RBC QN AUTO: 31.5 PG (ref 26.5–33)
MCHC RBC AUTO-ENTMCNC: 35.9 G/DL (ref 31.5–36.5)
MCHC RBC AUTO-ENTMCNC: 36.4 G/DL (ref 31.5–36.5)
MCV RBC AUTO: 86 FL (ref 78–100)
MCV RBC AUTO: 88 FL (ref 78–100)
MONOCYTES # BLD MANUAL: 0.7 10E3/UL (ref 0–1.3)
MONOCYTES # BLD MANUAL: 1.3 10E3/UL (ref 0–1.3)
MONOCYTES NFR BLD MANUAL: 5 %
MONOCYTES NFR BLD MANUAL: 7 %
NEUTROPHILS # BLD MANUAL: 11.6 10E3/UL (ref 1.6–8.3)
NEUTROPHILS # BLD MANUAL: 15.3 10E3/UL (ref 1.6–8.3)
NEUTROPHILS NFR BLD MANUAL: 78 %
NEUTROPHILS NFR BLD MANUAL: 82 %
NITRATE UR QL: NEGATIVE
NRBC # BLD AUTO: 0 10E3/UL
NRBC # BLD AUTO: 0 10E3/UL
NRBC BLD AUTO-RTO: 0 /100
NRBC BLD AUTO-RTO: 0 /100
O2/TOTAL GAS SETTING VFR VENT: 21 %
OPIATES UR QL SCN: ABNORMAL
OXYHGB MFR BLDV: 61 % (ref 70–75)
PCO2 BLDV: 43 MM HG (ref 40–50)
PCP QUAL URINE (ROCHE): ABNORMAL
PH BLDV: 7.35 [PH] (ref 7.32–7.43)
PH UR STRIP: 6 [PH] (ref 4.7–8)
PLAT MORPH BLD: ABNORMAL
PLAT MORPH BLD: ABNORMAL
PLATELET # BLD AUTO: 222 10E3/UL (ref 150–450)
PLATELET # BLD AUTO: 251 10E3/UL (ref 150–450)
PO2 BLDV: 31 MM HG (ref 25–47)
POTASSIUM SERPL-SCNC: 4.2 MMOL/L (ref 3.4–5.3)
POTASSIUM SERPL-SCNC: 4.5 MMOL/L (ref 3.4–5.3)
POTASSIUM SERPL-SCNC: 4.5 MMOL/L (ref 3.4–5.3)
POTASSIUM SERPL-SCNC: 4.7 MMOL/L (ref 3.4–5.3)
POTASSIUM SERPL-SCNC: 5 MMOL/L (ref 3.4–5.3)
PROCALCITONIN SERPL IA-MCNC: 1.75 NG/ML
PROT SERPL-MCNC: 5.6 G/DL (ref 6.4–8.3)
PROT SERPL-MCNC: 7 G/DL (ref 6.4–8.3)
RBC # BLD AUTO: 4.03 10E6/UL (ref 4.4–5.9)
RBC # BLD AUTO: 4.82 10E6/UL (ref 4.4–5.9)
RBC MORPH BLD: ABNORMAL
RBC MORPH BLD: ABNORMAL
RBC URINE: 0 /HPF
RSV RNA SPEC QL NAA+PROBE: NOT DETECTED
RSV RNA SPEC QL NAA+PROBE: NOT DETECTED
RV+EV RNA SPEC QL NAA+PROBE: NOT DETECTED
SAO2 % BLDV: 62.8 % (ref 70–75)
SODIUM SERPL-SCNC: 131 MMOL/L (ref 135–145)
SODIUM SERPL-SCNC: 133 MMOL/L (ref 135–145)
SODIUM SERPL-SCNC: 136 MMOL/L (ref 135–145)
SP GR UR STRIP: 1.02 (ref 1–1.03)
SQUAMOUS EPITHELIAL: 0 /HPF
TSH SERPL DL<=0.005 MIU/L-ACNC: 0.64 UIU/ML (ref 0.3–4.2)
UROBILINOGEN UR STRIP-MCNC: NORMAL MG/DL
WBC # BLD AUTO: 14.9 10E3/UL (ref 4–11)
WBC # BLD AUTO: 18.6 10E3/UL (ref 4–11)
WBC URINE: 0 /HPF

## 2024-05-25 PROCEDURE — 250N000012 HC RX MED GY IP 250 OP 636 PS 637: Performed by: HOSPITALIST

## 2024-05-25 PROCEDURE — 120N000001 HC R&B MED SURG/OB

## 2024-05-25 PROCEDURE — 82962 GLUCOSE BLOOD TEST: CPT

## 2024-05-25 PROCEDURE — C9113 INJ PANTOPRAZOLE SODIUM, VIA: HCPCS | Performed by: HOSPITALIST

## 2024-05-25 PROCEDURE — 36415 COLL VENOUS BLD VENIPUNCTURE: CPT | Performed by: HOSPITALIST

## 2024-05-25 PROCEDURE — 83605 ASSAY OF LACTIC ACID: CPT | Performed by: HOSPITALIST

## 2024-05-25 PROCEDURE — 250N000012 HC RX MED GY IP 250 OP 636 PS 637: Performed by: EMERGENCY MEDICINE

## 2024-05-25 PROCEDURE — 87476 LYME DIS DNA AMP PROBE: CPT | Performed by: HOSPITALIST

## 2024-05-25 PROCEDURE — 83036 HEMOGLOBIN GLYCOSYLATED A1C: CPT | Performed by: HOSPITALIST

## 2024-05-25 PROCEDURE — 83690 ASSAY OF LIPASE: CPT | Performed by: HOSPITALIST

## 2024-05-25 PROCEDURE — 81001 URINALYSIS AUTO W/SCOPE: CPT | Performed by: EMERGENCY MEDICINE

## 2024-05-25 PROCEDURE — 87633 RESP VIRUS 12-25 TARGETS: CPT | Performed by: HOSPITALIST

## 2024-05-25 PROCEDURE — 85025 COMPLETE CBC W/AUTO DIFF WBC: CPT | Performed by: HOSPITALIST

## 2024-05-25 PROCEDURE — 80048 BASIC METABOLIC PNL TOTAL CA: CPT | Performed by: HOSPITALIST

## 2024-05-25 PROCEDURE — 250N000011 HC RX IP 250 OP 636: Mod: JZ | Performed by: EMERGENCY MEDICINE

## 2024-05-25 PROCEDURE — 82010 KETONE BODYS QUAN: CPT | Performed by: EMERGENCY MEDICINE

## 2024-05-25 PROCEDURE — 36415 COLL VENOUS BLD VENIPUNCTURE: CPT | Performed by: EMERGENCY MEDICINE

## 2024-05-25 PROCEDURE — 84145 PROCALCITONIN (PCT): CPT | Performed by: HOSPITALIST

## 2024-05-25 PROCEDURE — 86666 EHRLICHIA ANTIBODY: CPT | Performed by: HOSPITALIST

## 2024-05-25 PROCEDURE — 87581 M.PNEUMON DNA AMP PROBE: CPT | Performed by: HOSPITALIST

## 2024-05-25 PROCEDURE — 80053 COMPREHEN METABOLIC PANEL: CPT | Performed by: EMERGENCY MEDICINE

## 2024-05-25 PROCEDURE — 84443 ASSAY THYROID STIM HORMONE: CPT | Performed by: HOSPITALIST

## 2024-05-25 PROCEDURE — 250N000013 HC RX MED GY IP 250 OP 250 PS 637: Performed by: HOSPITALIST

## 2024-05-25 PROCEDURE — 250N000011 HC RX IP 250 OP 636: Performed by: HOSPITALIST

## 2024-05-25 PROCEDURE — 99223 1ST HOSP IP/OBS HIGH 75: CPT | Mod: AI | Performed by: HOSPITALIST

## 2024-05-25 PROCEDURE — 74177 CT ABD & PELVIS W/CONTRAST: CPT

## 2024-05-25 PROCEDURE — 82805 BLOOD GASES W/O2 SATURATION: CPT | Performed by: EMERGENCY MEDICINE

## 2024-05-25 PROCEDURE — 250N000011 HC RX IP 250 OP 636: Performed by: EMERGENCY MEDICINE

## 2024-05-25 PROCEDURE — 258N000003 HC RX IP 258 OP 636: Performed by: EMERGENCY MEDICINE

## 2024-05-25 PROCEDURE — 82010 KETONE BODYS QUAN: CPT | Performed by: HOSPITALIST

## 2024-05-25 PROCEDURE — 71045 X-RAY EXAM CHEST 1 VIEW: CPT

## 2024-05-25 PROCEDURE — 86753 PROTOZOA ANTIBODY NOS: CPT | Performed by: HOSPITALIST

## 2024-05-25 PROCEDURE — 87040 BLOOD CULTURE FOR BACTERIA: CPT | Performed by: HOSPITALIST

## 2024-05-25 PROCEDURE — 85007 BL SMEAR W/DIFF WBC COUNT: CPT | Performed by: HOSPITALIST

## 2024-05-25 PROCEDURE — 80307 DRUG TEST PRSMV CHEM ANLYZR: CPT | Performed by: EMERGENCY MEDICINE

## 2024-05-25 PROCEDURE — 86140 C-REACTIVE PROTEIN: CPT | Performed by: HOSPITALIST

## 2024-05-25 PROCEDURE — 96361 HYDRATE IV INFUSION ADD-ON: CPT

## 2024-05-25 PROCEDURE — 96375 TX/PRO/DX INJ NEW DRUG ADDON: CPT

## 2024-05-25 PROCEDURE — 258N000003 HC RX IP 258 OP 636: Performed by: HOSPITALIST

## 2024-05-25 PROCEDURE — 83605 ASSAY OF LACTIC ACID: CPT | Performed by: EMERGENCY MEDICINE

## 2024-05-25 RX ORDER — ATORVASTATIN CALCIUM 20 MG/1
20 TABLET, FILM COATED ORAL DAILY
Status: DISCONTINUED | OUTPATIENT
Start: 2024-05-25 | End: 2024-05-28 | Stop reason: HOSPADM

## 2024-05-25 RX ORDER — OXYCODONE HYDROCHLORIDE 5 MG/1
5 TABLET ORAL EVERY 4 HOURS PRN
Status: DISCONTINUED | OUTPATIENT
Start: 2024-05-25 | End: 2024-05-28 | Stop reason: HOSPADM

## 2024-05-25 RX ORDER — FLUTICASONE FUROATE AND VILANTEROL 200; 25 UG/1; UG/1
1 POWDER RESPIRATORY (INHALATION) DAILY
Status: DISCONTINUED | OUTPATIENT
Start: 2024-05-25 | End: 2024-05-28 | Stop reason: HOSPADM

## 2024-05-25 RX ORDER — KETOROLAC TROMETHAMINE 15 MG/ML
15 INJECTION, SOLUTION INTRAMUSCULAR; INTRAVENOUS ONCE
Status: COMPLETED | OUTPATIENT
Start: 2024-05-25 | End: 2024-05-25

## 2024-05-25 RX ORDER — GABAPENTIN 300 MG/1
300 CAPSULE ORAL
Status: DISCONTINUED | OUTPATIENT
Start: 2024-05-25 | End: 2024-05-28 | Stop reason: HOSPADM

## 2024-05-25 RX ORDER — SODIUM CHLORIDE, SODIUM LACTATE, POTASSIUM CHLORIDE, CALCIUM CHLORIDE 600; 310; 30; 20 MG/100ML; MG/100ML; MG/100ML; MG/100ML
INJECTION, SOLUTION INTRAVENOUS CONTINUOUS
Status: DISCONTINUED | OUTPATIENT
Start: 2024-05-25 | End: 2024-05-25

## 2024-05-25 RX ORDER — HYDROCORTISONE 10 MG/1
20 TABLET ORAL ONCE
Status: COMPLETED | OUTPATIENT
Start: 2024-05-25 | End: 2024-05-25

## 2024-05-25 RX ORDER — DEXTROSE MONOHYDRATE 25 G/50ML
25-50 INJECTION, SOLUTION INTRAVENOUS
Status: DISCONTINUED | OUTPATIENT
Start: 2024-05-25 | End: 2024-05-28 | Stop reason: HOSPADM

## 2024-05-25 RX ORDER — CEFTRIAXONE SODIUM 2 G/50ML
2 INJECTION, SOLUTION INTRAVENOUS EVERY 24 HOURS
Status: DISCONTINUED | OUTPATIENT
Start: 2024-05-26 | End: 2024-05-27

## 2024-05-25 RX ORDER — ONDANSETRON 2 MG/ML
4 INJECTION INTRAMUSCULAR; INTRAVENOUS EVERY 6 HOURS PRN
Status: DISCONTINUED | OUTPATIENT
Start: 2024-05-25 | End: 2024-05-28 | Stop reason: HOSPADM

## 2024-05-25 RX ORDER — ESCITALOPRAM OXALATE 20 MG/1
20 TABLET ORAL DAILY
Status: DISCONTINUED | OUTPATIENT
Start: 2024-05-25 | End: 2024-05-28 | Stop reason: HOSPADM

## 2024-05-25 RX ORDER — ENOXAPARIN SODIUM 100 MG/ML
40 INJECTION SUBCUTANEOUS EVERY 24 HOURS
Status: DISCONTINUED | OUTPATIENT
Start: 2024-05-25 | End: 2024-05-28 | Stop reason: HOSPADM

## 2024-05-25 RX ORDER — IOPAMIDOL 755 MG/ML
100 INJECTION, SOLUTION INTRAVASCULAR ONCE
Status: COMPLETED | OUTPATIENT
Start: 2024-05-25 | End: 2024-05-25

## 2024-05-25 RX ORDER — DIAZEPAM 10 MG/2ML
10 INJECTION, SOLUTION INTRAMUSCULAR; INTRAVENOUS ONCE
Status: COMPLETED | OUTPATIENT
Start: 2024-05-25 | End: 2024-05-25

## 2024-05-25 RX ORDER — CEFTRIAXONE SODIUM 1 G/50ML
INJECTION, SOLUTION INTRAVENOUS
Status: COMPLETED
Start: 2024-05-25 | End: 2024-05-25

## 2024-05-25 RX ORDER — AMOXICILLIN 250 MG
1 CAPSULE ORAL 2 TIMES DAILY PRN
Status: DISCONTINUED | OUTPATIENT
Start: 2024-05-25 | End: 2024-05-28 | Stop reason: HOSPADM

## 2024-05-25 RX ORDER — METOCLOPRAMIDE HYDROCHLORIDE 5 MG/ML
10 INJECTION INTRAMUSCULAR; INTRAVENOUS ONCE
Status: COMPLETED | OUTPATIENT
Start: 2024-05-25 | End: 2024-05-25

## 2024-05-25 RX ORDER — ONDANSETRON 4 MG/1
4 TABLET, ORALLY DISINTEGRATING ORAL EVERY 6 HOURS PRN
Status: DISCONTINUED | OUTPATIENT
Start: 2024-05-25 | End: 2024-05-28 | Stop reason: HOSPADM

## 2024-05-25 RX ORDER — CALCIUM CARBONATE 500 MG/1
1000 TABLET, CHEWABLE ORAL 4 TIMES DAILY PRN
Status: DISCONTINUED | OUTPATIENT
Start: 2024-05-25 | End: 2024-05-28 | Stop reason: HOSPADM

## 2024-05-25 RX ORDER — AMOXICILLIN 250 MG
2 CAPSULE ORAL 2 TIMES DAILY PRN
Status: DISCONTINUED | OUTPATIENT
Start: 2024-05-25 | End: 2024-05-28 | Stop reason: HOSPADM

## 2024-05-25 RX ORDER — CEFTRIAXONE SODIUM 1 G/50ML
1 INJECTION, SOLUTION INTRAVENOUS EVERY 24 HOURS
Status: DISCONTINUED | OUTPATIENT
Start: 2024-05-25 | End: 2024-05-25

## 2024-05-25 RX ORDER — NICOTINE POLACRILEX 4 MG
15-30 LOZENGE BUCCAL
Status: DISCONTINUED | OUTPATIENT
Start: 2024-05-25 | End: 2024-05-28 | Stop reason: HOSPADM

## 2024-05-25 RX ORDER — PREDNISONE 5 MG/1
10 TABLET ORAL DAILY
Status: DISCONTINUED | OUTPATIENT
Start: 2024-05-25 | End: 2024-05-28 | Stop reason: HOSPADM

## 2024-05-25 RX ORDER — LIDOCAINE 40 MG/G
CREAM TOPICAL
Status: DISCONTINUED | OUTPATIENT
Start: 2024-05-25 | End: 2024-05-28 | Stop reason: HOSPADM

## 2024-05-25 RX ORDER — LISINOPRIL 5 MG/1
5 TABLET ORAL DAILY
Status: DISCONTINUED | OUTPATIENT
Start: 2024-05-25 | End: 2024-05-28 | Stop reason: HOSPADM

## 2024-05-25 RX ORDER — SODIUM CHLORIDE 9 MG/ML
INJECTION, SOLUTION INTRAVENOUS CONTINUOUS
Status: DISCONTINUED | OUTPATIENT
Start: 2024-05-25 | End: 2024-05-25

## 2024-05-25 RX ORDER — ACETAMINOPHEN 325 MG/1
650 TABLET ORAL EVERY 4 HOURS PRN
Status: DISCONTINUED | OUTPATIENT
Start: 2024-05-25 | End: 2024-05-28 | Stop reason: HOSPADM

## 2024-05-25 RX ADMIN — ACETAMINOPHEN 650 MG: 325 TABLET, FILM COATED ORAL at 15:33

## 2024-05-25 RX ADMIN — SODIUM CHLORIDE, POTASSIUM CHLORIDE, SODIUM LACTATE AND CALCIUM CHLORIDE: 600; 310; 30; 20 INJECTION, SOLUTION INTRAVENOUS at 05:56

## 2024-05-25 RX ADMIN — INSULIN DETEMIR 10 UNITS: 100 INJECTION, SOLUTION SUBCUTANEOUS at 10:02

## 2024-05-25 RX ADMIN — PANTOPRAZOLE SODIUM 40 MG: 40 INJECTION, POWDER, FOR SOLUTION INTRAVENOUS at 09:59

## 2024-05-25 RX ADMIN — ACETAMINOPHEN 650 MG: 325 TABLET, FILM COATED ORAL at 20:19

## 2024-05-25 RX ADMIN — DIAZEPAM 10 MG: 10 INJECTION, SOLUTION INTRAMUSCULAR; INTRAVENOUS at 03:20

## 2024-05-25 RX ADMIN — ENOXAPARIN SODIUM 40 MG: 40 INJECTION SUBCUTANEOUS at 20:22

## 2024-05-25 RX ADMIN — SODIUM CHLORIDE 1000 ML: 9 INJECTION, SOLUTION INTRAVENOUS at 01:11

## 2024-05-25 RX ADMIN — ACETAMINOPHEN 650 MG: 325 TABLET, FILM COATED ORAL at 10:55

## 2024-05-25 RX ADMIN — ACETAMINOPHEN 650 MG: 325 TABLET, FILM COATED ORAL at 05:41

## 2024-05-25 RX ADMIN — ESCITALOPRAM OXALATE 20 MG: 20 TABLET ORAL at 10:00

## 2024-05-25 RX ADMIN — KETOROLAC TROMETHAMINE 15 MG: 15 INJECTION INTRAMUSCULAR; INTRAVENOUS at 01:12

## 2024-05-25 RX ADMIN — HYDROCORTISONE 20 MG: 10 TABLET ORAL at 10:00

## 2024-05-25 RX ADMIN — CEFTRIAXONE SODIUM 1 G: 1 INJECTION, SOLUTION INTRAVENOUS at 07:37

## 2024-05-25 RX ADMIN — ATORVASTATIN CALCIUM 20 MG: 20 TABLET, FILM COATED ORAL at 10:00

## 2024-05-25 RX ADMIN — INSULIN ASPART 10 UNITS: 100 INJECTION, SOLUTION INTRAVENOUS; SUBCUTANEOUS at 05:07

## 2024-05-25 RX ADMIN — Medication 2000 MG: at 08:23

## 2024-05-25 RX ADMIN — METOCLOPRAMIDE HYDROCHLORIDE 10 MG: 5 INJECTION INTRAMUSCULAR; INTRAVENOUS at 01:12

## 2024-05-25 RX ADMIN — SODIUM CHLORIDE 1000 ML: 9 INJECTION, SOLUTION INTRAVENOUS at 04:09

## 2024-05-25 RX ADMIN — IOPAMIDOL 100 ML: 755 INJECTION, SOLUTION INTRAVENOUS at 00:44

## 2024-05-25 RX ADMIN — SODIUM CHLORIDE 1000 ML: 9 INJECTION, SOLUTION INTRAVENOUS at 02:04

## 2024-05-25 RX ADMIN — INSULIN HUMAN 10 UNITS: 100 INJECTION, SOLUTION PARENTERAL at 00:30

## 2024-05-25 RX ADMIN — SODIUM CHLORIDE: 9 INJECTION, SOLUTION INTRAVENOUS at 12:52

## 2024-05-25 RX ADMIN — SODIUM CHLORIDE 1000 ML: 9 INJECTION, SOLUTION INTRAVENOUS at 03:19

## 2024-05-25 RX ADMIN — SODIUM CHLORIDE 1000 ML: 9 INJECTION, SOLUTION INTRAVENOUS at 00:24

## 2024-05-25 ASSESSMENT — ACTIVITIES OF DAILY LIVING (ADL)
ADLS_ACUITY_SCORE: 35
ADLS_ACUITY_SCORE: 35
ADLS_ACUITY_SCORE: 21
ADLS_ACUITY_SCORE: 21
ADLS_ACUITY_SCORE: 35
ADLS_ACUITY_SCORE: 21
ADLS_ACUITY_SCORE: 21
ADLS_ACUITY_SCORE: 20
ADLS_ACUITY_SCORE: 35
ADLS_ACUITY_SCORE: 21
ADLS_ACUITY_SCORE: 20
ADLS_ACUITY_SCORE: 21
ADLS_ACUITY_SCORE: 35
ADLS_ACUITY_SCORE: 20
ADLS_ACUITY_SCORE: 21
ADLS_ACUITY_SCORE: 21

## 2024-05-25 ASSESSMENT — ENCOUNTER SYMPTOMS
SHORTNESS OF BREATH: 0
FEVER: 0
CHILLS: 0

## 2024-05-25 NOTE — DISCHARGE INSTRUCTIONS
What to expect when you have contrast    During your exam, we will inject  contrast  into your vein or artery. (Contrast is a clear liquid with iodine in it. It shows up on X-rays.)    You may feel warm or hot. You may have a metal taste in your mouth and a slight upset stomach. You may also feel pressure near the kidneys and bladder. These effects will last about 1 to 3 minutes.    Please tell us if you have:   Sneezing    Itching   Hives    Swelling in the face   A hoarse voice   Breathing problems   Other new symptoms    Serious problems are rare.  They may include:   Irregular heartbeat    Seizures   Kidney failure             Tissue damage   Shock     Death    If you have any problems during the exam, we  will treat them right away.    When you get home    Call your hospital if you have any new symptoms in the next 2 days, like hives or swelling. (Phone numbers are at the bottom of this page.) Or call your family doctor.     If you have wheezing or trouble breathing, call 911.    Self-care  -Drink at least 4 extra glasses of water today.   This reduces the stress on your kidneys.  -Keep taking your regular medicines.    The contrast will pass out of your body in your  Urine(pee). This will happen in the next 24 hours. You  will not feel this. Your urine will not  change color.    If you have kidney problems or take metformin    Drink 4 to 8 large glasses of water for the next  2 days, if you are not on a fluid restriction.    ?If you take metformin (Glucophage or Glucovance) for diabetes, keep taking it.      ?Your kidney function tests are abnormal.  If you take Metformin, do not take it for 48 hours. Please go to your clinic for a blood test within 3 days after your exam before the restarting this medicine.     (Note to provider:please give patient prescription for lab tests.)    ?Special instructions:     I have read and understand the above information.    Patient Sign  Here:______________________________________Date:________Time:______    Staff Sign Here:________________________________________Date:_______Time:______      Radiology Departments:     ?[facility]: [phone] ?[place]: [phone]     ?[place]: [phone] ?[place]:[phone]      ?[place]: [phone]  ?[place]: [phone]  ?[place]:[phone]    ?[facility]:[phone]  ?[facility]:[phone]        DR GERBER'S OFFICE WILL REACH OUT TO YOU TO LINE UP A FOLLOW UP APPOINTMENT --IF YOU DON'T HEAR FROM THEM WITHIN THE NEXT COUPLE OF DAYS CALL [phone]

## 2024-05-25 NOTE — ED NOTES
"Patient remained afebrile the entire time he was in ED. When this nurse went in to give 10U insulin before transfer upstairs for admission, patient states he suddenly felt \"hot\" about 15 mins prior to our encounter. Temp check 101 F Tympanic. When entering temp, Sepsis alert popped up. Let Dr. Coffman know. Let receiving nurse know about new developing temp as well. Patient's wife went home to rest. Patient still unable to void but states he would like to wait a little longer to see if he can go on his own.   "

## 2024-05-25 NOTE — PLAN OF CARE
Pt started own insulin pump at approx 1610 per home settings per MD approval. Pt self managing pump.  Pt given clip board and pen to keep track of own     5:00 pt gave extra correction dose and meal dose    10.2 units    1840 Pt gave self correction dose of 8.4 units (glucose: 334)    2150: pt gave self correction dose only, glucose: 307, 8.6 units

## 2024-05-25 NOTE — ED PROVIDER NOTES
History     Chief Complaint   Patient presents with    Nausea, Vomiting, & Diarrhea     Started at 4am. Countless episodes. Type 1 Diabetic.    Abdominal Pain     Generalized    Headache     HPI  Yaya Mg is a 49 year old male who is here with vomiting since 4 in the morning.  Awoke with the symptoms.  Later developed headache and abdominal pain.  Abdominal pain is generalized.  No terra blood in the vomit but significant other is concerned there may been a little bit.  No diarrhea.    Allergies:  No Known Allergies    Problem List:    Patient Active Problem List    Diagnosis Date Noted    Tachycardia 05/25/2024     Priority: Medium    Nausea and vomiting, unspecified vomiting type 05/25/2024     Priority: Medium    DKA (diabetic ketoacidosis) (H) 05/25/2024     Priority: Medium    Current use of steroid medication 02/15/2024     Priority: Medium    Pulmonary sarcoidosis (H24) 02/15/2024     Priority: Medium    Morbid obesity (H) 11/10/2022     Priority: Medium    Umbilical hernia without obstruction and without gangrene 11/10/2022     Priority: Medium    Burning sensation of feet 11/10/2022     Priority: Medium    FH: colon cancer in relative diagnosed at >50 years old 11/10/2022     Priority: Medium    Erectile dysfunction due to diseases classified elsewhere 12/15/2021     Priority: Medium    Type 1 diabetes mellitus without complication (H) 12/15/2021     Priority: Medium    Depression with anxiety 12/15/2021     Priority: Medium    Mixed hyperlipidemia 12/15/2021     Priority: Medium    Elevated LFTs 12/15/2021     Priority: Medium        Past Medical History:    Past Medical History:   Diagnosis Date    Anxiety     Depressive disorder     Hypertension     Type 1 diabetes (H)        Past Surgical History:    Past Surgical History:   Procedure Laterality Date    APPENDECTOMY  01/1985    BACK SURGERY  2010    lumbar calcium deposits removed    CATARACT EXTRACTION Right 2021    COLONOSCOPY N/A  "2/2/2023    Procedure: COLONOSCOPY with polypectomy;  Surgeon: Gildardo Nichols MD;  Location: [facility]    IMPLANT PROSTHESIS PENIS INFLATABLE N/A 1/3/2023    Procedure: INSERTION of INFLATABLE PENILE PROSTHESIS;  Surgeon: Amandeep Hill MD;  Location: [facility]       Family History:    Family History   Problem Relation Age of Onset    Hypertension Father     Hyperlipidemia Father     Obesity Father     Obesity Brother     Hypertension Brother     Depression Brother        Social History:  Marital Status:   [2]  Social History     Tobacco Use    Smoking status: Never     Passive exposure: Never    Smokeless tobacco: Never   Vaping Use    Vaping status: Never Used   Substance Use Topics    Alcohol use: Yes     Comment: socially    Drug use: Never        Medications:    No current outpatient medications on file.        Review of Systems   Constitutional:  Negative for chills and fever.   Respiratory:  Negative for shortness of breath.    Cardiovascular:  Negative for chest pain.   All other systems reviewed and are negative.      Physical Exam   BP: 134/69  Pulse: (!) 130  Temp: 98.6  F (37  C)  Resp: 18  Height: 172.7 cm (5' 8\")  Weight: 110.8 kg (244 lb 4.3 oz)  SpO2: 93 %      Physical Exam  Constitutional:       General: He is not in acute distress.     Appearance: Normal appearance.   HENT:      Head: Normocephalic and atraumatic.      Right Ear: External ear normal.      Left Ear: External ear normal.      Nose: Nose normal. No rhinorrhea.   Eyes:      Conjunctiva/sclera: Conjunctivae normal.   Cardiovascular:      Rate and Rhythm: Regular rhythm. Tachycardia present.      Pulses: Normal pulses.   Pulmonary:      Effort: Pulmonary effort is normal. No respiratory distress.      Breath sounds: Normal breath sounds.   Abdominal:      General: There is no distension.      Palpations: Abdomen is soft.      Tenderness: There is generalized abdominal tenderness.   Musculoskeletal:         General: No " deformity or signs of injury.   Skin:     General: Skin is warm and dry.      Capillary Refill: Capillary refill takes less than 2 seconds.   Neurological:      General: No focal deficit present.      Mental Status: He is alert. Mental status is at baseline.   Psychiatric:         Mood and Affect: Mood normal.         Behavior: Behavior normal.         ED Course        Procedures             Critical Care time:               Results for orders placed or performed during the hospital encounter of 05/24/24 (from the past 24 hour(s))   CBC with platelets differential    Narrative    The following orders were created for panel order CBC with platelets differential.  Procedure                               Abnormality         Status                     ---------                               -----------         ------                     CBC with platelets and d...[[id number]]  Abnormal            Final result               Manual Differential[[id number]]          Abnormal            Final result                 Please view results for these tests on the individual orders.   Comprehensive metabolic panel   Result Value Ref Range    Sodium 131 (L) 135 - 145 mmol/L    Potassium 5.0 3.4 - 5.3 mmol/L    Carbon Dioxide (CO2) 20 (L) 22 - 29 mmol/L    Anion Gap 22 (H) 7 - 15 mmol/L    Urea Nitrogen 24.8 (H) 6.0 - 20.0 mg/dL    Creatinine 1.17 0.67 - 1.17 mg/dL    GFR Estimate 76 >60 mL/min/1.73m2    Calcium 10.2 (H) 8.6 - 10.0 mg/dL    Chloride 89 (L) 98 - 107 mmol/L    Glucose 499 (H) 70 - 99 mg/dL    Alkaline Phosphatase 122 40 - 150 U/L    AST 25 0 - 45 U/L    ALT 39 0 - 70 U/L    Protein Total 7.0 6.4 - 8.3 g/dL    Albumin 4.8 3.5 - 5.2 g/dL    Bilirubin Total 2.3 (H) <=1.2 mg/dL   Lipase   Result Value Ref Range    Lipase 8 (L) 13 - 60 U/L   Ethyl Alcohol Level   Result Value Ref Range    Alcohol ethyl <0.01 <=0.01 g/dL   Ketone Beta-Hydroxybutyrate Quantitative   Result Value Ref Range    Ketone (Beta-Hydroxybutyrate)  Quantitative 3.70 (HH) <=0.30 mmol/L   CBC with platelets and differential   Result Value Ref Range    WBC Count 18.6 (H) 4.0 - 11.0 10e3/uL    RBC Count 4.82 4.40 - 5.90 10e6/uL    Hemoglobin 15.1 13.3 - 17.7 g/dL    Hematocrit 41.5 40.0 - 53.0 %    MCV 86 78 - 100 fL    MCH 31.3 26.5 - 33.0 pg    MCHC 36.4 31.5 - 36.5 g/dL    RDW 13.3 10.0 - 15.0 %    Platelet Count 251 150 - 450 10e3/uL    NRBCs per 100 WBC 0 <1 /100    Absolute NRBCs 0.0 10e3/uL   Manual Differential   Result Value Ref Range    % Neutrophils 82 %    % Lymphocytes 10 %    % Monocytes 7 %    % Eosinophils 0 %    % Basophils 1 %    Absolute Neutrophils 15.3 (H) 1.6 - 8.3 10e3/uL    Absolute Lymphocytes 1.9 0.8 - 5.3 10e3/uL    Absolute Monocytes 1.3 0.0 - 1.3 10e3/uL    Absolute Eosinophils 0.0 0.0 - 0.7 10e3/uL    Absolute Basophils 0.2 0.0 - 0.2 10e3/uL    RBC Morphology Confirmed RBC Indices     Platelet Assessment  Automated Count Confirmed. Platelet morphology is normal.     Automated Count Confirmed. Platelet morphology is normal.   Lactic acid whole blood with 1x repeat in 2 hr when >2   Result Value Ref Range    Lactic Acid, Initial 4.2 (HH) 0.7 - 2.0 mmol/L   Blood gas venous   Result Value Ref Range    pH Venous 7.32 7.32 - 7.43    pCO2 Venous 41 40 - 50 mm Hg    pO2 Venous 28 25 - 47 mm Hg    Bicarbonate Venous 21 21 - 28 mmol/L    Base Excess/Deficit Venous -4.7 (L) -3.0 - 3.0 mmol/L    FIO2 21     Oxyhemoglobin Venous 53 (L) 70 - 75 %    O2 Sat, Venous 54.6 (L) 70.0 - 75.0 %    Narrative    In healthy individuals, oxyhemoglobin (O2Hb) and oxygen saturation (SO2) are approximately equal. In the presence of dyshemoglobins, oxyhemoglobin can be considerably lower than oxygen saturation.   Glucose by meter   Result Value Ref Range    GLUCOSE BY METER POCT 316 (H) 70 - 99 mg/dL   Comprehensive metabolic panel   Result Value Ref Range    Sodium 133 (L) 135 - 145 mmol/L    Potassium 4.5 3.4 - 5.3 mmol/L    Carbon Dioxide (CO2) 24 22 - 29  mmol/L    Anion Gap 12 7 - 15 mmol/L    Urea Nitrogen 21.8 (H) 6.0 - 20.0 mg/dL    Creatinine 1.01 0.67 - 1.17 mg/dL    GFR Estimate >90 >60 mL/min/1.73m2    Calcium 8.6 8.6 - 10.0 mg/dL    Chloride 97 (L) 98 - 107 mmol/L    Glucose 310 (H) 70 - 99 mg/dL    Alkaline Phosphatase 89 40 - 150 U/L    AST 18 0 - 45 U/L    ALT 30 0 - 70 U/L    Protein Total 5.6 (L) 6.4 - 8.3 g/dL    Albumin 3.8 3.5 - 5.2 g/dL    Bilirubin Total 1.5 (H) <=1.2 mg/dL   Blood gas venous   Result Value Ref Range    pH Venous 7.35 7.32 - 7.43    pCO2 Venous 43 40 - 50 mm Hg    pO2 Venous 31 25 - 47 mm Hg    Bicarbonate Venous 24 21 - 28 mmol/L    Base Excess/Deficit Venous -2.2 -3.0 - 3.0 mmol/L    FIO2 21     Oxyhemoglobin Venous 61 (L) 70 - 75 %    O2 Sat, Venous 62.8 (L) 70.0 - 75.0 %    Narrative    In healthy individuals, oxyhemoglobin (O2Hb) and oxygen saturation (SO2) are approximately equal. In the presence of dyshemoglobins, oxyhemoglobin can be considerably lower than oxygen saturation.   Ketone Beta-Hydroxybutyrate Quantitative   Result Value Ref Range    Ketone (Beta-Hydroxybutyrate) Quantitative 1.70 (HH) <=0.30 mmol/L   Lactic acid whole blood   Result Value Ref Range    Lactic Acid 2.0 0.7 - 2.0 mmol/L   Glucose by meter   Result Value Ref Range    GLUCOSE BY METER POCT 266 (H) 70 - 99 mg/dL   Glucose by meter   Result Value Ref Range    GLUCOSE BY METER POCT 286 (H) 70 - 99 mg/dL       Medications   atorvastatin (LIPITOR) tablet 20 mg (has no administration in time range)   escitalopram (LEXAPRO) tablet 20 mg (has no administration in time range)   gabapentin (NEURONTIN) capsule 300 mg (has no administration in time range)   lisinopril (ZESTRIL) tablet 5 mg (has no administration in time range)   predniSONE (DELTASONE) tablet 10 mg (has no administration in time range)   lidocaine 1 % 0.1-1 mL (has no administration in time range)   lidocaine (LMX4) cream (has no administration in time range)   sodium chloride (PF) 0.9% PF flush  3 mL (3 mLs Intracatheter $Given 5/25/24 0559)   sodium chloride (PF) 0.9% PF flush 3 mL (has no administration in time range)   senna-docusate (SENOKOT-S/PERICOLACE) 8.6-50 MG per tablet 1 tablet (has no administration in time range)     Or   senna-docusate (SENOKOT-S/PERICOLACE) 8.6-50 MG per tablet 2 tablet (has no administration in time range)   calcium carbonate (TUMS) chewable tablet 1,000 mg (has no administration in time range)   glucose gel 15-30 g (has no administration in time range)     Or   dextrose 50 % injection 25-50 mL (has no administration in time range)     Or   glucagon injection 1 mg (has no administration in time range)   enoxaparin ANTICOAGULANT (LOVENOX) injection 40 mg (has no administration in time range)   lactated ringers infusion ( Intravenous $New Bag 5/25/24 0556)   ondansetron (ZOFRAN ODT) ODT tab 4 mg (has no administration in time range)     Or   ondansetron (ZOFRAN) injection 4 mg (has no administration in time range)   insulin aspart (NovoLOG) injection (RAPID ACTING) (has no administration in time range)   insulin aspart (NovoLOG) injection (RAPID ACTING) (4 Units Subcutaneous $Given 5/25/24 0602)   insulin detemir (LEVEMIR PEN) injection 10 Units (has no administration in time range)   acetaminophen (TYLENOL) tablet 650 mg (650 mg Oral $Given 5/25/24 0541)   oxyCODONE (ROXICODONE) tablet 5 mg (has no administration in time range)   sodium chloride 0.9% BOLUS 1,000 mL (0 mLs Intravenous Stopped 5/25/24 0022)   ondansetron (ZOFRAN) injection 4 mg (4 mg Intravenous $Given 5/24/24 2341)   sodium chloride 0.9% BOLUS 1,000 mL (0 mLs Intravenous Stopped 5/25/24 0111)   insulin (regular) (HumuLIN R/NovoLIN R) injection 10 Units (10 Units Intravenous $Given 5/25/24 0030)   iopamidol (ISOVUE-370) solution 100 mL (100 mLs Intravenous $Given 5/25/24 0044)   sodium chloride (PF) 0.9% PF flush 60 mL (50 mLs Intravenous $Given 5/25/24 0044)   ketorolac (TORADOL) injection 15 mg (15 mg  Intravenous $Given 5/25/24 0112)   metoclopramide (REGLAN) injection 10 mg (10 mg Intravenous $Given 5/25/24 0112)   sodium chloride 0.9% BOLUS 1,000 mL (0 mLs Intravenous Stopped 5/25/24 0149)   sodium chloride 0.9% BOLUS 1,000 mL (0 mLs Intravenous Stopped 5/25/24 0254)   diazepam (VALIUM) injection 10 mg (10 mg Intravenous $Given 5/25/24 0320)   sodium chloride 0.9% BOLUS 1,000 mL (0 mLs Intravenous Stopped 5/25/24 0407)   sodium chloride 0.9% BOLUS 1,000 mL (0 mLs Intravenous Stopped 5/25/24 0440)   insulin aspart (NovoLOG) injection (RAPID ACTING) (10 Units Subcutaneous $Given 5/25/24 0507)       Assessments & Plan (with Medical Decision Making)     I have reviewed the nursing notes.    I have reviewed the findings, diagnosis, plan and need for follow up with the patient.          Medical Decision Making  The patient's presentation was of moderate complexity (an undiagnosed new problem with uncertain prognosis).    The patient's evaluation involved:  ordering and/or review of 3+ test(s) in this encounter (see separate area of note for details)    The patient's management necessitated high risk (a decision regarding hospitalization).    49-year-old male here with nausea vomiting, subsequently abdominal pain and headache.  Headache resolved after IV fluids, Toradol and Reglan.  Abdominal pain has resolved after he has stopped vomiting which occurred after the IV Zofran.  Rule out DKA with normal VBG pH.  Mild elevated anion gap, should be close after the above fluids however will recheck with repeat chemistry.  Bilirubin likely elevated due to dehydration but will reassess with repeat CMP.  CT scan without any acute pathology identified.  Patient is at this point asymptomatic.  Has not urinated since arriving to the department, will give him an additional liter of fluid in hopes that will bring down his heart rate.    Reassessment  Heart rate in but after 6 L.  Gave empiric Valium in case this was alcohol  withdrawal, did not improve symptoms.  Admitted patient for continued IV hydration and workup.  Prior to him going upstairs, he spiked a fever, prior to that had no complaints of infectious nature.    Current Discharge Medication List          Final diagnoses:   Tachycardia   Nausea and vomiting, unspecified vomiting type       5/24/2024   [facility]       John Ramirez MD  05/25/24 0612

## 2024-05-25 NOTE — ED TRIAGE NOTES
Patient presents today with his wife with complaints of nausea, vomiting, and diarrhea that woke him from sleep at 0400 today. Patient has not had any intake since yesterday (Thursday) afternoon. Patient is a Type 1 Diabetic. He has tried taking in broth, etc. but is unable to keep even sips of water down. Patient has 8/10 abdominal pain that is generalized. This started today. He has had a couple diarrhea episodes but most concerning has been the constant vomiting. He has vomited every 15 minutes approx. per patient report-all day. Patient has a dermal BG monitor and it has said that his BG has been very high (400s). Patient is tachycardic, approx. 130. Pt. states he feels very weak and when he gets sick, he gets really really sick. Patient also has a headache.      Triage Assessment (Adult)       Row Name 05/24/24 9490          Triage Assessment    Airway WDL WDL        Respiratory WDL    Respiratory WDL WDL        Skin Circulation/Temperature WDL    Skin Circulation/Temperature WDL WDL        Cardiac WDL    Cardiac WDL WDL        Peripheral/Neurovascular WDL    Peripheral Neurovascular WDL WDL        Cognitive/Neuro/Behavioral WDL    Cognitive/Neuro/Behavioral WDL WDL

## 2024-05-25 NOTE — PHARMACY
"Pharmacy Antimicrobial Stewardship Assessment     Current Antimicrobial Therapy:  Anti-infectives (From now, onward)      Start     Dose/Rate Route Frequency Ordered Stop    24 0630  cefTRIAXone IN D5W (ROCEPHIN) intermittent infusion 2 g         2 g  100 mL/hr over 30 Minutes Intravenous EVERY 24 HOURS 24 1113      24 1500  vancomycin (VANCOCIN) 2,000 mg in 0.9% NaCl 500 mL intermittent infusion        Placed in \"Followed by\" Linked Group    2,000 mg  over 2 Hours Intravenous EVERY 18 HOURS 24 0843            Indication: Sepsis    Days of Therapy: 1     Pertinent Labs:    Recent Labs   Lab Test 24  0643 24  2338 02/15/24  0808 10/03/23  0857 23  0811 23  0856 23  0832   WBC 14.9* 18.6* 10.8 5.6 5.3 4.8 6.9       Recent Labs   Lab Test 24  0708 24  0148 24  0145 24  2339 10/03/23  0857   LACT 1.3 2.0  --    < >  --    CRPI  --   --  31.37*  --  10.15*   PCAL  --   --  1.75*  --   --     < > = values in this interval not displayed.        Temperature:  Temp (24hrs), Av.7  F (37.6  C), Min:98.6  F (37  C), Max:101  F (38.3  C)      Culture Results:   30-Day Micro Results       Collected Updated Procedure Result Status      2024 0827 2024 0849 Respiratory Panel PCR [[id number]]    Swab from Nasopharyngeal    In process Component Value   No component results            2024 0827 2024 0849 Respiratory Panel PCR, Nasopharyngeal [[id number]]   Swab from Nasopharyngeal    In process Component Value   No component results            2024 0650 2024 0655 Blood Culture Hand, Right [[id number]]   Blood from Hand, Right    In process Component Value   No component results               2024 0643 2024 0655 Blood Culture Hand, Left [[id number]]   Blood from Hand, Left    In process Component Value   No component results               2024 0643 2024 0655 Lyme disease DNA detection by " PCR [[id number]]   Blood from Hand, Left    In process Component Value   No component results                   Recommendations/Interventions:  1. Changed the ceftriaxone dosing to 2 grams every 24 hours per the Dose Adjustments for Body Weight policy.  2. Vancomycin level ordered for tomorrow at 1400. Pharmacy will assess and adjust vancomycin dosing at that time if needed.    Philomena Marcum, [facility]  May 25, 2024

## 2024-05-25 NOTE — H&P
[facility]    History and Physical - Hospitalist Service       Date of Admission:  5/24/2024    Assessment & Plan      Yaya Mg is a 49 year old male admitted on 5/24/2024 with nausea, vomiting, uncontrolled diabetes      # Cyclic vomiting syndrome in the setting of THC use  # Intractable nausea and vomiting  -49 year old male who with established diagnosis of diabetes mellitus type 1 presented to the hospital complaining of intractable nausea, vomiting.  Vomiting started the day prior at around 4 AM and he was vomiting the whole day every 15 minutes.    -CT abdomen unremarkable  -  Patient started using marijuana Gummies 2 months ago to help him sleep.    -  In the ER patient was given Zofran with improvement of her nausea.    -Patient advised to stop using marijuana    # Early DKA improved  # Diabetes mellitus type 1 uncontrolled with hyperglycemia  # Nausea vomiting   -During the day while he was excessively vomiting his insulin pump ran out of insulin so he removed it and started using subcutaneous insulin.  -Upon  arrival in the ER glucose elevated at 499, anion gap elevated at 22, lactic acid elevated at 4.2, beta hydroxybutyrate elevated at 3.7,  - pH was at the lower normal value at 7.32, bicarb was low at 20.   - CT abdomen negative for any acute pathology.   -Patient was assessed as having most likely early DKA.    -In the ER patient was given 5 L of IV fluid bolus with 10 units of regular insulin IV, Zofran  -  Repeat labs showed improvement with a glucose of 266, anion gap closed at 12, beta hydroxybutyrate improved at 1.7.   - Patient admitted to the hospital to continue IV hydration and insulin treatment.    -He is no longer in DKA based on the labs.  -Continue IV hydration with LR at 150 mL every hour  -Additional 10 units of NovoLog ordered  -Start patient on basal bolus insulin regimen with 10 units of Levemir and sliding scale NovoLog high-dose        # Pulmonary  sarcoidosis  -Continue home prednisone 10 mg daily    Addendum 5:26  -Fever  #sirs  -For the whole ER stay patient was febrile but developed a fever of 101 after admission  -Leukocytosis WBC elevated at 18.6  -Chest x-ray ordered  -UA ordered  -Will hold off antibiotic for now as  I don't  see any source  of infection        Diet:  Diabetic diet  DVT Prophylaxis: Enoxaparin (Lovenox) SQ  Leblanc Catheter: Not present  Lines: None     Cardiac Monitoring: None  Code Status:  Full code    Clinically Significant Risk Factors Present on Admission           # Hypercalcemia: Highest Ca = 10.2 mg/dL in last 2 days, will monitor as appropriate   # Anion Gap Metabolic Acidosis: Highest Anion Gap = 22 mmol/L in last 2 days, will monitor and treat as appropriate      # Hypertension: Home medication list includes antihypertensive(s)                 Disposition Plan     Medically Ready for Discharge:            Abel Goodman MD  Hospitalist Service  [facility]  Securely message with dotCloud (more info)  Text page via [facility] Paging/Directory     ______________________________________________________________________    Chief Complaint   Nausea, vomiting    History is obtained from the patient    History of Present Illness   Yaya Mg is a 49 year old male who with established diagnosis of diabetes mellitus type 1, sarcoidosis who presented to the hospital complaining of intractable nausea, vomiting.  Vomiting started the day prior at around 4 AM and he was vomiting the whole day every 15 minutes.  Later during the day patient developed headache and abdominal pain.  He denies fever, chills, diarrhea, constipation.  Patient started using marijuana Gummies 2 months ago to help him sleep.  Patient also has a insulin pump which was working well.  During the day while he was excessively vomiting his insulin pump ran out of insulin so he removed it and started using subcutaneous insulin.    Upon i arrival in the ER  glucose elevated at 499, anion gap elevated at 22, lactic acid elevated at 4.2, beta hydroxybutyrate elevated at 3.7, pH was at the lower normal value at 7.32, bicarb was low at 20.  CT abdomen negative for any acute pathology.     Patient was assessed as having most likely early cyclic vomiting syndrome from THC, with subsequent early  DKA.  In the ER patient was given Zofran with improvement of her nausea.  He was also given 5 L of IV fluid bolus with 10 units of regular insulin IV.  Repeat labs showed improvement with a glucose of 266, anion gap closed at 12, beta hydroxybutyrate improved at 1.7.  Patient admitted to the hospital to continue IV hydration and insulin treatment.  He is no longer in DKA based on the labs.       Past Medical History    Past Medical History:   Diagnosis Date    Anxiety     Depressive disorder     Hypertension     Type 1 diabetes (H)        Past Surgical History   Past Surgical History:   Procedure Laterality Date    APPENDECTOMY  01/1985    BACK SURGERY  2010    lumbar calcium deposits removed    CATARACT EXTRACTION Right 2021    COLONOSCOPY N/A 2/2/2023    Procedure: COLONOSCOPY with polypectomy;  Surgeon: Gildardo Nichols MD;  Location: [facility] OR    IMPLANT PROSTHESIS PENIS INFLATABLE N/A 1/3/2023    Procedure: INSERTION of INFLATABLE PENILE PROSTHESIS;  Surgeon: Amandeep Hill MD;  Location: [facility] OR       Family History:    Family History         Family History   Problem Relation Age of Onset    Hypertension Father      Hyperlipidemia Father      Obesity Father      Obesity Brother      Hypertension Brother      Depression Brother              Social History:  Marital Status:   [2]  Social History   []Expand by Default            Tobacco Use    Smoking status: Never       Passive exposure: Never    Smokeless tobacco: Never   Vaping Use    Vaping status: Never Used   Substance Use Topics    Alcohol use: Yes       Comment: socially    Drug use: Never         Allergies:  Allergies[]Expand by Default   No Known Allergies     Prior to Admission Medications   Prior to Admission Medications   Prescriptions Last Dose Informant Patient Reported? Taking?   Continuous Blood Gluc  (DEXCOM G6 ) JUSTEN   Yes No   Continuous Blood Gluc Sensor (DEXCOM G6 SENSOR) MISC   No No   Sig: USE ONE SENSOR EVERY 10 DAYS, CHANGE EVERY 10 DAYS   Continuous Blood Gluc Transmit (DEXCOM G6 TRANSMITTER) MISC   No No   Si each every 3 months Change every 3 months.   Insulin Infusion Pump (T: SLIM X2 INS /CONTROL 7.4) JUSTEN   Yes No   Insulin Infusion Pump Supplies (AUTOSOFT 90 INFUSION SET) MISC   No No   Sig: Inject 1 each Subcutaneous every 48 hours   Insulin Infusion Pump Supplies (T:SLIM X2 3ML CARTRIDGE) MISC   No No   Sig: Inject 1 each Subcutaneous every 48 hours   atorvastatin (LIPITOR) 20 MG tablet   No No   Sig: TAKE 1 TABLET DAILY   azithromycin (ZITHROMAX) 250 MG tablet   No No   Sig: As directed   cefuroxime (CEFTIN) 500 MG tablet   No No   Sig: Take 1 tablet (500 mg) by mouth 2 times daily   escitalopram (LEXAPRO) 20 MG tablet   No No   Sig: TAKE 1 TABLET DAILY   gabapentin (NEURONTIN) 300 MG capsule   No No   Si-2 capsules at night as needed   insulin aspart (NOVOLOG VIAL) 100 UNITS/ML vial   No No   Sig: Uses 150 units daily in his pump   insulin lispro (HUMALOG) 100 UNIT/ML vial   No No   Sig: To be used with insulin pump total daily dose 150 units   lisinopril (ZESTRIL) 5 MG tablet   No No   Sig: TAKE 1 TABLET BY MOUTH EVERY DAY   predniSONE (DELTASONE) 10 MG tablet   Yes No   predniSONE (DELTASONE) 20 MG tablet   Yes No   Sig: Take 20 mg by mouth 2 times daily   sulfamethoxazole-trimethoprim (BACTRIM DS) 800-160 MG tablet   Yes No   Sig: Take 1 tablet by mouth three times a week      Facility-Administered Medications: None        Review of Systems    The 10 point Review of Systems is negative other than noted in the HPI      Physical Exam   Vital Signs:  Temp: 98.6  F (37  C) Temp src: Tympanic BP: 117/71 Pulse: 114   Resp: 18 SpO2: 95 % O2 Device: Nasal cannula Oxygen Delivery: 2 LPM  Weight: 0 lbs 0 oz    Constitutional:       General: He is not in acute distress.     Appearance: Normal appearance.   HENT:      Head: Normocephalic and atraumatic.      Right Ear: External ear normal.      Left Ear: External ear normal.      Nose: Nose normal. No rhinorrhea.   Eyes:      Conjunctiva/sclera: Conjunctivae normal.   Cardiovascular:      Rate and Rhythm: Regular rhythm. Tachycardia present.      Pulses: Normal pulses.   Pulmonary:      Effort: Pulmonary effort is normal. No respiratory distress.      Breath sounds: Normal breath sounds.   Abdominal:      General: There is no distension.      Palpations: Abdomen is soft.      Tenderness: There is generalized abdominal tenderness.   Musculoskeletal:         General: No deformity or signs of injury.   Skin:     General: Skin is warm and dry.      Capillary Refill: Capillary refill takes less than 2 seconds.   Neurological:      General: No focal deficit present.      Mental Status: He is alert. Mental status is at baseline.   Psychiatric:         Mood and Affect: Mood normal.         Behavior: Behavior normal.            Medical Decision Making       80 MINUTES SPENT BY ME on the date of service doing chart review, history, exam, documentation & further activities per the note.      Data

## 2024-05-25 NOTE — PROGRESS NOTES
Please see H&P from this morning for further detail.  Patient was admitted with early DKA, he did have hyperglycemia sugars 1 400 he received insulin in the hospital.  Patient also was found to have elevated white blood cell count initially thought to be secondary from his early DKA.  Patient was given 5 L normal saline bolus and continued on maintenance fluid 150 NS an hour.  Patient also did have initial complaint of nausea and vomiting when he presented to emergency room he did undergo CT scan of the abdomen pelvis no acute findings.  He was on 2 L oxygen chest x-ray was done no acute findings he denies any cough he denied any abdominal pain besides when he was having vomiting.  He denied any rashes no tick bites no diarrhea no sick contacts.  Patient did run out of insulin approximately 2 days prior and has not refilled his insulin pump.  He was n.p.o. in the hospital and was started on empiric antibiotics on 5/25 as he spiked a fever his procalcitonin also was elevated at 1.75, initial lactic acid was 4 but improved to 2, he has not made any urine in the hospital but CT scan of his abdomen pelvis did show urine in his bladder which we do have a urine analysis pending.  Plan on repeating the labs and reevaluate his current medical status cultures are pending respiratory viral panel blood cultures.  And also is on prednisone has been for the last 6 to 8 months for sarcoidosis diagnosed previously was on 40 mg daily but now is on 10 mg.  Hydrocortisone was also ordered orally for stress dose as he spiked a fever.  -Vancomycin Rocephin empiric, discussed with pharmacy  -Monitor blood cultures pending  -Respiratory viral panel pending  -Tickborne panel pending  -Urinalysis done no urinary tract infection  -Lactic acid repeat normal  -BMP done  -TSH normal  -Hydrocortisone 20 mg once now stress dose as per up-to-date recommendation  -Lipase is normal  -Monitor ketones beta to hydroxybutyrate  -Repeat CBC reevaluate  leukocytosis, improving white blood cell count  -MAP above 65 may need to transfer to ICU for pressors if hypotensive  -Off IV fluids  -Started PPI as patient is on steroids chronically  -RN patient was updated regarding plan of care.      Addendum  Patient was seen for follow-up discussed with his wife who is at bedside.  Patient is feeling better still has a little bit of a headache no confusion no forgetfulness no vision changes no hallucinations no neck pain no signs of meningitis.  He has not had any more fevers but he did receive Tylenol.  He has been getting IV fluids and his right arm is more swollen now which we did discontinue the IV fluids as he started to eat more.  I did order an ultrasound of the right arm to rule out DVT.  Also patient was restarted on his home insulin pump orders were placed discussed with pharmacy and RN and the patient.  RN will enter in manually is sliding scale and carb correction counts.  He is on a basal rate of 2.6 units/h.  He is receiving Humulin as his home insulin.  Patient did urinate and UA did not appear to be any signs of urinary tract infection.  He does have elevated beta-2 hydroxybutyrate but his anion gap is normal I suspect this likely secondary from not eating and starvation ketosis.  -Continue to monitor  -Stopped IV fluids  -Change IV to left arm  -Right upper extremity ultrasound to rule out DVT  -Will check BMP and beta-2 hydroxybutyrate at 9 PM  -Night MD to follow-up.  -Home insulin pump order set placed  -Okay for carbohydrate diet

## 2024-05-25 NOTE — PHARMACY-VANCOMYCIN DOSING SERVICE
"Pharmacy Vancomycin Initial Note  Date of Service May 25, 2024  Patient's  1975  49 year old, male    Indication: Sepsis    Current estimated CrCl = Estimated Creatinine Clearance: 106.9 mL/min (based on SCr of 1.01 mg/dL).    Creatinine for last 3 days  2024: 11:38 PM Creatinine 1.17 mg/dL  2024:  1:45 AM Creatinine 1.01 mg/dL    Recent Vancomycin Level(s) for last 3 days  No results found for requested labs within last 3 days.      Vancomycin IV Administrations (past 72 hours)        No vancomycin orders with administrations in past 72 hours.                    Nephrotoxins and other renal medications (From now, onward)      Start     Dose/Rate Route Frequency Ordered Stop    24 1300  vancomycin (VANCOCIN) 2,000 mg in 0.9% NaCl 500 mL intermittent infusion        Placed in \"Followed by\" Linked Group    2,000 mg  over 2 Hours Intravenous EVERY 18 HOURS 24 0626      24 0900  lisinopril (ZESTRIL) tablet 5 mg        Note to Pharmacy: PTA Sig:TAKE 1 TABLET BY MOUTH EVERY DAY      5 mg Oral DAILY 24 0556      24 0630  vancomycin (VANCOCIN) 2,000 mg in 0.9% NaCl 500 mL intermittent infusion        Placed in \"Followed by\" Linked Group    2,000 mg  over 2 Hours Intravenous ONCE 24 0626              Contrast Orders - past 72 hours (72h ago, onward)      Start     Dose/Rate Route Frequency Stop    24 0040  iopamidol (ISOVUE-370) solution 100 mL         100 mL Intravenous ONCE 24 0044            Insight[employer] Prediction of Planned Initial Vancomycin Regimen  Loading dose: N/A  Regimen: 2000 mg IV every 18 hours.  Start time: 06:24 on 2024  Exposure target: AUC24 (range)400-600 mg/L.hr   AUC24,ss: 508 mg/L.hr  Probability of AUC24 > 400: 76 %  Ctrough,ss: 14.4 mg/L  Probability of Ctrough,ss > 20: 23 %  Probability of nephrotoxicity (Lodise CINTHYA ): 10 %          Plan:  Start vancomycin  2000 mg IV q18h. Giving 2nd dose early to assist with " loading.  Vancomycin monitoring method: AUC  Vancomycin therapeutic monitoring goal: 400-600 mg*h/L  Pharmacy will check vancomycin levels as appropriate in 1-3 Days.    Serum creatinine levels will be ordered daily for the first week of therapy and at least twice weekly for subsequent weeks.      Henrry Barrios RPH

## 2024-05-25 NOTE — PLAN OF CARE
Pt alert and oriented, calm and cooperative. VS and assessment as charted. C/o headache and rec'd Tylenol with effectiveness. T max 99.6. Pt SP02 >94% when awake, while asleep pt does desat to the mid 80's so placed on 2 LPM NC. IV infusing /hr, vanco and rocephin continue for abx. Cons cho diet, Fair appetite, denied any nausea after eating. 's- insulin coverage per MAR. Voiding without difficulty, airam urine. Free from falls/injury, call light within reach, makes needs known.     Face to face report given with opportunity to observe patient.    Report given to TRACI Johnson RN   5/25/2024  3:23 PM

## 2024-05-25 NOTE — PLAN OF CARE
[facility] Inpatient Admission Note:    Patient admitted to 3208/3208-1 at approximately 0530 via wheel chair accompanied by nurse from emergency room . Report received from Agnieszka Martin in SBAR format at 0515 via telephone. Patient ambulated to bed via self.. Patient is alert and oriented X 3, reports pain; rates at 5 on 0-10 scale.  Patient oriented to room, unit, hourly rounding, and plan of care. Explained admission packet and patient handbook with patient bill of rights brochure. Will continue to monitor and document as needed.     Inpatient Nursing criteria listed below was met:    Health care directives status obtained and documented: No    Patient identifies a surrogate decision maker: No If yes, who:NA Contact Information:NA     If initial lactic acid greater than 2.0, repeat lactic acid drawn within one hour of arrival to unit: NA. If no, state reason: NA    Clergy visit ordered if patient requests: No    Skin issues/needs documented: Yes    Isolation Patient: no Education given, correct sign in place and documentation row added to PCS:   NA    Fall Prevention No: Care plan updated, education given and documented, sticker and magnet in place: Yes    Care Plan initiated: Yes    Education Documented (including assessment): Yes    Patient has discharge needs : No If yes, please explain:NA

## 2024-05-26 ENCOUNTER — ANESTHESIA EVENT (OUTPATIENT)
Dept: MEDSURG UNIT | Facility: HOSPITAL | Age: 49
End: 2024-05-26
Payer: COMMERCIAL

## 2024-05-26 ENCOUNTER — ANESTHESIA (OUTPATIENT)
Dept: MEDSURG UNIT | Facility: HOSPITAL | Age: 49
End: 2024-05-26
Payer: COMMERCIAL

## 2024-05-26 ENCOUNTER — APPOINTMENT (OUTPATIENT)
Dept: CT IMAGING | Facility: HOSPITAL | Age: 49
End: 2024-05-26
Attending: HOSPITALIST
Payer: COMMERCIAL

## 2024-05-26 LAB
ANION GAP SERPL CALCULATED.3IONS-SCNC: 4 MMOL/L (ref 7–15)
APPEARANCE CSF: CLEAR
B-OH-BUTYR SERPL-SCNC: <0.18 MMOL/L
BASOPHILS # BLD AUTO: 0 10E3/UL (ref 0–0.2)
BASOPHILS NFR BLD AUTO: 1 %
BUN SERPL-MCNC: 13.5 MG/DL (ref 6–20)
CALCIUM SERPL-MCNC: 8.5 MG/DL (ref 8.6–10)
CHLORIDE SERPL-SCNC: 105 MMOL/L (ref 98–107)
COLOR CSF: COLORLESS
CREAT SERPL-MCNC: 0.76 MG/DL (ref 0.67–1.17)
CRP SERPL-MCNC: 44.5 MG/L
DEPRECATED HCO3 PLAS-SCNC: 28 MMOL/L (ref 22–29)
EGFRCR SERPLBLD CKD-EPI 2021: >90 ML/MIN/1.73M2
EOSINOPHIL # BLD AUTO: 0.1 10E3/UL (ref 0–0.7)
EOSINOPHIL NFR BLD AUTO: 1 %
ERYTHROCYTE [DISTWIDTH] IN BLOOD BY AUTOMATED COUNT: 13.2 % (ref 10–15)
GLUCOSE CSF-MCNC: 100 MG/DL (ref 40–70)
GLUCOSE SERPL-MCNC: 175 MG/DL (ref 70–99)
HCT VFR BLD AUTO: 34.1 % (ref 40–53)
HGB BLD-MCNC: 12.3 G/DL (ref 13.3–17.7)
HOLD SPECIMEN: NORMAL
IMM GRANULOCYTES # BLD: 0 10E3/UL
IMM GRANULOCYTES NFR BLD: 0 %
LYMPHOCYTES # BLD AUTO: 1.4 10E3/UL (ref 0.8–5.3)
LYMPHOCYTES NFR BLD AUTO: 18 %
MCH RBC QN AUTO: 31.1 PG (ref 26.5–33)
MCHC RBC AUTO-ENTMCNC: 36.1 G/DL (ref 31.5–36.5)
MCV RBC AUTO: 86 FL (ref 78–100)
MONOCYTES # BLD AUTO: 0.8 10E3/UL (ref 0–1.3)
MONOCYTES NFR BLD AUTO: 10 %
NEUTROPHILS # BLD AUTO: 5.6 10E3/UL (ref 1.6–8.3)
NEUTROPHILS NFR BLD AUTO: 70 %
NRBC # BLD AUTO: 0 10E3/UL
NRBC BLD AUTO-RTO: 0 /100
PLATELET # BLD AUTO: 147 10E3/UL (ref 150–450)
POTASSIUM SERPL-SCNC: 3.5 MMOL/L (ref 3.4–5.3)
PROCALCITONIN SERPL IA-MCNC: 1.24 NG/ML
PROT CSF-MCNC: 49.6 MG/DL (ref 15–45)
RBC # BLD AUTO: 3.96 10E6/UL (ref 4.4–5.9)
RBC # CSF MANUAL: 8 /UL (ref 0–2)
SODIUM SERPL-SCNC: 137 MMOL/L (ref 135–145)
TUBE # CSF: 4
VANCOMYCIN SERPL-MCNC: 10.4 UG/ML
WBC # BLD AUTO: 8 10E3/UL (ref 4–11)
WBC # CSF MANUAL: 3 /UL (ref 0–5)

## 2024-05-26 PROCEDURE — 82784 ASSAY IGA/IGD/IGG/IGM EACH: CPT | Performed by: HOSPITALIST

## 2024-05-26 PROCEDURE — 84145 PROCALCITONIN (PCT): CPT | Performed by: HOSPITALIST

## 2024-05-26 PROCEDURE — 80048 BASIC METABOLIC PNL TOTAL CA: CPT | Performed by: HOSPITALIST

## 2024-05-26 PROCEDURE — 009U3ZX DRAINAGE OF SPINAL CANAL, PERCUTANEOUS APPROACH, DIAGNOSTIC: ICD-10-PCS | Performed by: NURSE ANESTHETIST, CERTIFIED REGISTERED

## 2024-05-26 PROCEDURE — 82010 KETONE BODYS QUAN: CPT | Performed by: HOSPITALIST

## 2024-05-26 PROCEDURE — 258N000003 HC RX IP 258 OP 636: Performed by: HOSPITALIST

## 2024-05-26 PROCEDURE — 62270 DX LMBR SPI PNXR: CPT | Performed by: NURSE ANESTHETIST, CERTIFIED REGISTERED

## 2024-05-26 PROCEDURE — 250N000011 HC RX IP 250 OP 636: Performed by: HOSPITALIST

## 2024-05-26 PROCEDURE — 85025 COMPLETE CBC W/AUTO DIFF WBC: CPT | Performed by: HOSPITALIST

## 2024-05-26 PROCEDURE — 250N000013 HC RX MED GY IP 250 OP 250 PS 637: Performed by: HOSPITALIST

## 2024-05-26 PROCEDURE — 120N000001 HC R&B MED SURG/OB

## 2024-05-26 PROCEDURE — 86140 C-REACTIVE PROTEIN: CPT | Performed by: HOSPITALIST

## 2024-05-26 PROCEDURE — 36415 COLL VENOUS BLD VENIPUNCTURE: CPT | Performed by: HOSPITALIST

## 2024-05-26 PROCEDURE — 87075 CULTR BACTERIA EXCEPT BLOOD: CPT | Performed by: HOSPITALIST

## 2024-05-26 PROCEDURE — 250N000012 HC RX MED GY IP 250 OP 636 PS 637: Performed by: HOSPITALIST

## 2024-05-26 PROCEDURE — 84157 ASSAY OF PROTEIN OTHER: CPT | Performed by: HOSPITALIST

## 2024-05-26 PROCEDURE — 82164 ANGIOTENSIN I ENZYME TEST: CPT | Performed by: HOSPITALIST

## 2024-05-26 PROCEDURE — 87070 CULTURE OTHR SPECIMN AEROBIC: CPT | Performed by: HOSPITALIST

## 2024-05-26 PROCEDURE — 70450 CT HEAD/BRAIN W/O DYE: CPT

## 2024-05-26 PROCEDURE — 999N000157 HC STATISTIC RCP TIME EA 10 MIN

## 2024-05-26 PROCEDURE — 82232 ASSAY OF BETA-2 PROTEIN: CPT | Performed by: HOSPITALIST

## 2024-05-26 PROCEDURE — 83873 ASSAY OF CSF PROTEIN: CPT | Performed by: HOSPITALIST

## 2024-05-26 PROCEDURE — 87483 CNS DNA AMP PROBE TYPE 12-25: CPT | Performed by: HOSPITALIST

## 2024-05-26 PROCEDURE — 80202 ASSAY OF VANCOMYCIN: CPT | Performed by: HOSPITALIST

## 2024-05-26 PROCEDURE — 89050 BODY FLUID CELL COUNT: CPT | Performed by: HOSPITALIST

## 2024-05-26 PROCEDURE — 82945 GLUCOSE OTHER FLUID: CPT | Performed by: HOSPITALIST

## 2024-05-26 PROCEDURE — 87205 SMEAR GRAM STAIN: CPT | Performed by: HOSPITALIST

## 2024-05-26 PROCEDURE — 99233 SBSQ HOSP IP/OBS HIGH 50: CPT | Performed by: HOSPITALIST

## 2024-05-26 PROCEDURE — C9113 INJ PANTOPRAZOLE SODIUM, VIA: HCPCS | Performed by: HOSPITALIST

## 2024-05-26 RX ORDER — PROCHLORPERAZINE MALEATE 5 MG
10 TABLET ORAL EVERY 6 HOURS PRN
Status: DISCONTINUED | OUTPATIENT
Start: 2024-05-26 | End: 2024-05-28 | Stop reason: HOSPADM

## 2024-05-26 RX ORDER — LORAZEPAM 2 MG/ML
1 INJECTION INTRAMUSCULAR EVERY 6 HOURS PRN
Status: DISCONTINUED | OUTPATIENT
Start: 2024-05-26 | End: 2024-05-28 | Stop reason: HOSPADM

## 2024-05-26 RX ORDER — PROCHLORPERAZINE 25 MG
25 SUPPOSITORY, RECTAL RECTAL EVERY 12 HOURS PRN
Status: DISCONTINUED | OUTPATIENT
Start: 2024-05-26 | End: 2024-05-28 | Stop reason: HOSPADM

## 2024-05-26 RX ORDER — METOCLOPRAMIDE HYDROCHLORIDE 5 MG/ML
10 INJECTION INTRAMUSCULAR; INTRAVENOUS 3 TIMES DAILY
Status: DISCONTINUED | OUTPATIENT
Start: 2024-05-26 | End: 2024-05-27

## 2024-05-26 RX ORDER — SODIUM CHLORIDE 9 MG/ML
INJECTION, SOLUTION INTRAVENOUS CONTINUOUS
Status: DISCONTINUED | OUTPATIENT
Start: 2024-05-26 | End: 2024-05-27

## 2024-05-26 RX ORDER — DIPHENHYDRAMINE HYDROCHLORIDE 50 MG/ML
25 INJECTION INTRAMUSCULAR; INTRAVENOUS EVERY 6 HOURS PRN
Status: DISCONTINUED | OUTPATIENT
Start: 2024-05-26 | End: 2024-05-28 | Stop reason: HOSPADM

## 2024-05-26 RX ADMIN — SODIUM CHLORIDE, PRESERVATIVE FREE: 5 INJECTION INTRAVENOUS at 14:15

## 2024-05-26 RX ADMIN — CEFTRIAXONE SODIUM 2 G: 2 INJECTION, SOLUTION INTRAVENOUS at 06:30

## 2024-05-26 RX ADMIN — LORAZEPAM 1 MG: 2 INJECTION INTRAMUSCULAR; INTRAVENOUS at 19:40

## 2024-05-26 RX ADMIN — OXYCODONE HYDROCHLORIDE 5 MG: 5 TABLET ORAL at 10:40

## 2024-05-26 RX ADMIN — OXYCODONE HYDROCHLORIDE 5 MG: 5 TABLET ORAL at 20:57

## 2024-05-26 RX ADMIN — ACETAMINOPHEN 650 MG: 325 TABLET, FILM COATED ORAL at 03:29

## 2024-05-26 RX ADMIN — METOCLOPRAMIDE HYDROCHLORIDE 10 MG: 5 INJECTION INTRAMUSCULAR; INTRAVENOUS at 20:58

## 2024-05-26 RX ADMIN — ONDANSETRON 4 MG: 2 INJECTION INTRAMUSCULAR; INTRAVENOUS at 15:40

## 2024-05-26 RX ADMIN — PANTOPRAZOLE SODIUM 40 MG: 40 INJECTION, POWDER, FOR SOLUTION INTRAVENOUS at 07:41

## 2024-05-26 RX ADMIN — OXYCODONE HYDROCHLORIDE 5 MG: 5 TABLET ORAL at 06:35

## 2024-05-26 RX ADMIN — PREDNISONE 10 MG: 5 TABLET ORAL at 10:15

## 2024-05-26 RX ADMIN — Medication 2000 MG: at 01:51

## 2024-05-26 RX ADMIN — ACETAMINOPHEN 650 MG: 325 TABLET, FILM COATED ORAL at 15:40

## 2024-05-26 RX ADMIN — SODIUM CHLORIDE, PRESERVATIVE FREE: 5 INJECTION INTRAVENOUS at 08:53

## 2024-05-26 RX ADMIN — ONDANSETRON 4 MG: 2 INJECTION INTRAMUSCULAR; INTRAVENOUS at 08:51

## 2024-05-26 RX ADMIN — SODIUM CHLORIDE, PRESERVATIVE FREE: 5 INJECTION INTRAVENOUS at 23:20

## 2024-05-26 RX ADMIN — Medication 1500 MG: at 14:12

## 2024-05-26 RX ADMIN — ESCITALOPRAM OXALATE 20 MG: 20 TABLET ORAL at 10:15

## 2024-05-26 RX ADMIN — METOCLOPRAMIDE HYDROCHLORIDE 10 MG: 5 INJECTION INTRAMUSCULAR; INTRAVENOUS at 12:58

## 2024-05-26 RX ADMIN — ATORVASTATIN CALCIUM 20 MG: 20 TABLET, FILM COATED ORAL at 10:15

## 2024-05-26 ASSESSMENT — ACTIVITIES OF DAILY LIVING (ADL)
ADLS_ACUITY_SCORE: 20

## 2024-05-26 NOTE — PLAN OF CARE
"Most recent vitals: /72 (BP Location: Right arm, Patient Position: Semi-Rincon's, Cuff Size: Adult Regular)   Pulse 77   Temp 99  F (37.2  C) (Tympanic)   Resp 17   Ht 1.727 m (5' 8\")   Wt 110.8 kg (244 lb 4.3 oz)   SpO2 92%   BMI 37.14 kg/m      Pt A&O, VSS, afebrile. RA w/ SPO2>92%. Complaints of severe headache. PRN tylenol and oxycodone given. IV infusing NS at 125mL/hr. Continues w/ IV Rocephin and vanco. Right arm swelling decreased. CT head w/o contrast completed. Intermittent nausea, receiving scheduled Reglan and PRN IV Zofran. Up independently in room.     Face to face report given with opportunity to observe patient.    Report given to Mary Cobb RN   5/26/2024  7:20 PM    "

## 2024-05-26 NOTE — PLAN OF CARE
Goal Outcome Evaluation:    Pt is alert and oriented. Reports minimal to moderate pain in head. PRN tylenol given. Slightly effective. Oxy given x1. Reports minimal, intermittent nausea, not requiring medication. No vomiting this shift. Swelling to rt arm is minimal. Flagged for sepsis. Results of 1.1 Pt uses 2 L of oxygen at bedtime as he was noted to drop during the night. VSS. Assessment as charted. Pt checking and administering his own insulin via insulin pump. Call light in reach, uses appropriately.    2330 BG was 276 pt gave before meal with adjustment 8.4 units.    0114 BG was 243 pt gave coverage of 0.96 units.     0150 Recheck BG was 172.

## 2024-05-26 NOTE — PLAN OF CARE
Face to face report given with opportunity to observe patient.    Report given to TRACI Coughlin RN   5/26/2024  7:15 AM

## 2024-05-26 NOTE — PHARMACY-VANCOMYCIN DOSING SERVICE
Pharmacy Vancomycin Note  Date of Service May 26, 2024  Patient's  1975   49 year old, male    Indication: Sepsis  Day of Therapy: 2  Current vancomycin regimen:  2000 mg IV q18h  Current vancomycin monitoring method: AUC  Current vancomycin therapeutic monitoring goal: 400-600 mg*h/L    InsightRX Prediction of Current Vancomycin Regimen  Loading dose: N/A  Regimen: 2000 mg IV every 18 hours.  Start time: 19:51 on 2024  Exposure target: AUC24 (range)400-600 mg/L.hr   AUC24,ss: 415 mg/L.hr  Probability of AUC24 > 400: 55 %  Ctrough,ss: 13.1 mg/L  Probability of Ctrough,ss > 20: 17 %  Probability of nephrotoxicity (Lodise CINTHYA ): 8 %    Current estimated CrCl = Estimated Creatinine Clearance: 142 mL/min (based on SCr of 0.76 mg/dL).    Creatinine for last 3 days  2024: 11:38 PM Creatinine 1.17 mg/dL  2024:  1:45 AM Creatinine 1.01 mg/dL;  6:43 AM Creatinine 0.87 mg/dL;  3:07 PM Creatinine 0.80 mg/dL;  9:27 PM Creatinine 0.75 mg/dL  2024:  2:01 AM Creatinine 0.76 mg/dL    Recent Vancomycin Levels (past 3 days)  2024: 10:50 AM Vancomycin 10.4 ug/mL    Vancomycin IV Administrations (past 72 hours)                     vancomycin (VANCOCIN) 2,000 mg in 0.9% NaCl 500 mL intermittent infusion (mg) 2,000 mg New Bag 24 0151    vancomycin (VANCOCIN) 2,000 mg in 0.9% NaCl 500 mL intermittent infusion (mg) 2,000 mg New Bag 24 0823                    Nephrotoxins and other renal medications (From now, onward)      Start     Dose/Rate Route Frequency Ordered Stop    24 1400  vancomycin (VANCOCIN) 1,500 mg in 0.9% NaCl 250 mL intermittent infusion         1,500 mg  over 90 Minutes Intravenous EVERY 12 HOURS 24 1147      24 0900  [Held by provider]  lisinopril (ZESTRIL) tablet 5 mg        (On hold since yesterday at 0851 until manually unheld; held by Silas Kendrick DOHold Reason: Change in Vitals)   Note to Pharmacy: PTA Sig:TAKE 1 TABLET BY MOUTH  EVERY DAY      5 mg Oral DAILY 05/25/24 0556                 Contrast Orders - past 72 hours (72h ago, onward)      Start     Dose/Rate Route Frequency Stop    05/25/24 0040  iopamidol (ISOVUE-370) solution 100 mL         100 mL Intravenous ONCE 05/25/24 0044            Interpretation of levels and current regimen:  Vancomycin level is reflective of -600    Has serum creatinine changed greater than 50% in last 72 hours: No    Urine output:  unable to determine    Renal Function: Improving    InsightRX Prediction of Planned New Vancomycin Regimen  Loading dose: N/A  Regimen: 1500 mg IV every 12 hours.  Start time: 19:51 on 05/26/2024  Exposure target: AUC24 (range)400-600 mg/L.hr   AUC24,ss: 466 mg/L.hr  Probability of AUC24 > 400: 69 %  Ctrough,ss: 16.3 mg/L  Probability of Ctrough,ss > 20: 31 %  Probability of nephrotoxicity (Lodise CINTHYA 2009): 12 %    Plan:  Increase Dose to 1500 mg every 12 hours  Vancomycin monitoring method: AUC  Vancomycin therapeutic monitoring goal: 400-600 mg*h/L  Pharmacy will check vancomycin levels as appropriate in 1-3 Days.  Serum creatinine levels will be ordered daily for the first week of therapy and at least twice weekly for subsequent weeks.    Philomena Marcum [facility]

## 2024-05-26 NOTE — PLAN OF CARE
Free from falls/injuries this shift. Pts own Insulin pump was re-started by pt, under direction of Dr PHAN. @ 1610. Assess as charted. R) arm noted to be swollen. IV with good blood return. R) arm elevated on 2 pillows. CMS adequate. Attempt to re-start IV unsuccessful at this time. ICU RN  to attempt to re-start IV with use of US. Still with headache-3-4/10 a@1533 and 5/10 @ 2020. Denies neck stiffness. No nausea. Was up to shower and voided into toilet X2. (Not measured) Discussed with pt to continue to urinate into urinal for measurement. Agreeable. Lab called about Ketone draw Q4H. Ketones : 1507: 3.4   2127: 0.9.  Pt states he feels much better and is hopeful that he can go home tomorrow.  Face to face report given with opportunity to observe patient.    Report given to Mary Penn RN   5/25/2024  11:23 PM

## 2024-05-26 NOTE — DISCHARGE SUMMARY
"[facility]  Hospitalist Discharge Summary      Date of Admission:  5/24/2024  Date of Discharge:  5/26/2024  Discharging Provider: Silas Kendrick DO  Discharge Service: Hospitalist Service    Discharge Diagnoses   # Intractable nausea and vomiting  Headache  Abdomina pain   Neurologic sarcoidosis?   # Early DKA resolved  Lactic acidosis resolved  Starvation ketosis resolved   # Diabetes mellitus type 1 uncontrolled with hyperglycemia   # Pulmonary sarcoidosis  THC use( gumies)  -UDS is negative    Right arm swelling- improved   Hepatic steatosis.     Clinically Significant Risk Factors     # Obesity: Estimated body mass index is 37.14 kg/m  as calculated from the following:    Height as of this encounter: 1.727 m (5' 8\").    Weight as of this encounter: 110.8 kg (244 lb 4.3 oz).       Follow-ups Needed After Discharge        Unresulted Labs Ordered in the Past 30 Days of this Admission       Date and Time Order Name Status Description    5/25/2024  6:16 AM Babesia antibody IgG IgM In process     5/25/2024  6:16 AM Anaplasma phagocytoph Antibodies IgG IgM In process     5/25/2024  6:14 AM Lyme disease DNA detection by PCR In process     5/25/2024  5:24 AM Blood Culture Hand, Right Preliminary     5/25/2024  5:24 AM Blood Culture Hand, Left Preliminary         These results will be followed up by Hospitalist at [facility]     Discharge Disposition   Discharged to [facility]   Condition at discharge: Stable    Hospital Course   Patient is a type I diabetic with Humulin insulin pump, has established diagnosis of morbid obesity, depression anxiety, umbilical hernia,.  Patient recently was diagnosed with pulmonary sarcoidosis and has been on steroids now on a taper.  He previously did take Bactrim.  He is also used THC Gummies.  Patient woke up on Friday at 4 am in the morning with nausea and vomiting and dry heaving and noticed that his insulin pump was low and insulin but his wife was " unable to refill this for him.  Patient was not feeling well so he disconnected the insulin pump and he did do subcu insulin during the day on Friday.  He presented to the emergency room on Friday evening because he was not feeling well and, he was found to have anion gap of 22, bicarb of 20, pH venous 7.32, glucose 499, his lactic acid was 4.2, beta-2 hydroxybutyrate 3.7, abdominal pain.  CT abdomen was done with no acute findings, read from ct was Limited images through the lung bases demonstrate no focal consolidation or mass.  The liver demonstrates no mass or intrahepatic biliary ductal  dilatation. Hepatic steatosis is questioned. The gallbladder is unremarkable. The spleen is enlarged measuring 13.6 cm in length. The pancreas and adrenal glands are unremarkable. Symmetric nephrograms are present without hydronephrosis. There is no abdominal aortic aneurysm. The bowel is normal in caliber. A penile prosthesis is noted with a reservoir anterior to the right aspect of the bladder. No free fluid, free air or adenopathy is seen.  Patient was given total of 5 L of normal saline bolus in the emergency room and was started on maintenance fluids.  Patient initially did not make any urine but later did, urine analysis was done showing protein and glucose present in the urine no signs of urinary tract infection.  Patient did complain of pounding headache on admission and then improved with fluid administration and Tylenol.  Patient did have a fever of 101 in the hospital, tachycardia heart rate 120s, white blood cell count of 18.6.  He also did require 2 L of oxygen, chest x-ray was done showing no acute findings, respiratory viral panel was done which was negative blood cultures were collected and empiric antibiotics were started vancomycin Rocephin on May 25.-Initiation of his insulin pump his beta-2 hydroxybutyrate worsened but it was suspected likely secondary from starvation ketosis as he is not eating much.   Patient's blood cultures have been no growth to date.  He also did have Lyme's and anaplasmosis and babesiosis labs done which were pending.  Patient was transition from subcu insulin to his home insulin pump with improvement in his sugars which have been between 100-200.  His anion gap is 4, CO2 28.  He also did receive a dose of hydrocortisone due to having blood pressures in the 90s systolics possible stress reaction.  He was restarted on his home prednisone which is 10 mg a day.  On May 26 patient complained of worsening headache which she described in the front of his head and was pounding.  Patient did undergo CT scan of his head showing No acute intracranial hemorrhage or CT evidence of acute transcortical ischemia.  Patient was discussed with his wife who is bedside and we updated regarding current plan and care.  Patient was also discussed with neurology at [facility] Dr. Castañeda recommendation was for lumbar puncture and collecting a 6 level sarcoid cell cytology and rule out meningitis.  Patient also may benefit from MRI of the brain which was more ideal CT scan by unfortunately we do not have this available until earliest Tuesday.  Also did request that he is evaluated by his pulmonologist as he has sarcoidosis and neurologic sarcoidosis was on differential as per neurology and he will need further workup.  Patient was discussed with CRNA at the hospital here in [place] but were unable to do lumbar puncture due to patient having dry heaving and also he received Lovenox night at 8 PM and will need to wait 24 hours as per KACY CRNA received with lumbar puncture.  I did discuss the findings and care with the patient and he agreed with the plan.  I did call over to [facility] and discussed with intake provider Dr. Castaneda he was accepted for transfer once they have a bed available.  Paperwork is all filled out he may transfer by S.  Will attempt to do the lumbar puncture as per recommendation of soon as  possible which should be at 8 PM tonight.    LP orders were placed   Also Order placed to hold and freeze tube #3 incase more tests needed to be added on until 6/1/24     Consultations This Hospital Stay   PHARMACY TO DOSE VANCO  DIABETES EDUCATION IP CONSULT  ANESTHESIOLOGY IP CONSULT    Code Status   Full Code    Time Spent on this Encounter   I, Silas Kendrick DO, personally saw the patient today and spent greater than 30 minutes discharging this patient.       Silas Kendrick DO  [facility]  [address]  [place] [address]  Phone: [phone]  Fax: [phone]  ______________________________________________________________________    Physical Exam   Vital Signs: Temp: 99  F (37.2  C) Temp src: Tympanic BP: 130/72 Pulse: 77   Resp: 17 SpO2: 92 % O2 Device: None (Room air) Oxygen Delivery: 2 LPM  Weight: 244 lbs 4.31 oz  Physical Exam  Constitutional:       Appearance: He is obese.      Comments: Appears uncomfortable    HENT:      Right Ear: External ear normal.      Left Ear: External ear normal.      Nose:      Comments: Mild erythema mucosal membranes,   Deviated septum  to left      Mouth/Throat:      Pharynx: Oropharynx is clear.   Eyes:      Conjunctiva/sclera: Conjunctivae normal.   Cardiovascular:      Rate and Rhythm: Normal rate.   Pulmonary:      Effort: Pulmonary effort is normal.   Abdominal:      General: Abdomen is flat. There is no distension.      Palpations: There is no mass.      Tenderness: There is no abdominal tenderness.   Musculoskeletal:         General: No swelling.      Comments: Mild right arm swelling    Skin:     General: Skin is warm.   Neurological:      Mental Status: He is alert.      Comments: No focal deficits          Primary Care Physician   Selwyn Ashraf    Discharge Orders   No discharge procedures on file.    Significant Results and Procedures   Most Recent 3 CBC's:  Recent Labs   Lab Test 05/26/24  0201 05/25/24  0643 05/24/24  2338    WBC 8.0 14.9* 18.6*   HGB 12.3* 12.7* 15.1   MCV 86 88 86   * 222 251     Most Recent 3 BMP's:  Recent Labs   Lab Test 05/26/24  0201 05/25/24  2127 05/25/24  1538 05/25/24  1507    133*  --  133*   POTASSIUM 3.5 4.2  --  4.7   CHLORIDE 105 103  --  102   CO2 28 21*  --  19*   BUN 13.5 16.0  --  17.5   CR 0.76 0.75  --  0.80   ANIONGAP 4* 9  --  12   SONIA 8.5* 8.4*  --  8.3*   * 295* 338* 360*   ,   Results for orders placed or performed during the hospital encounter of 05/24/24   CT Abdomen Pelvis w Contrast    Narrative    PROCEDURE:  CT ABDOMEN PELVIS W CONTRAST    HISTORY: generalized abd pain, vomiting    TECHNIQUE: Helical CT of the abdomen and pelvis was performed  following injection of intravenous contrast. This CT exam was  performed using one or more the following dose reduction techniques:  automated exposure control, adjustment of the mA and/or kV according  to patient size, and/or iterative reconstruction technique.    COMPARISON: None.    MEDS/CONTRAST: Isovue 370 100mL    FINDINGS:      Limited images through the lung bases demonstrate no focal  consolidation or mass.     The liver demonstrates no mass or intrahepatic biliary ductal  dilatation. Hepatic steatosis is questioned. The gallbladder is  unremarkable. The spleen is enlarged measuring 13.6 cm in length. The  pancreas and adrenal glands are unremarkable. Symmetric nephrograms  are present without hydronephrosis. There is no abdominal aortic  aneurysm.    The bowel is normal in caliber. A penile prosthesis is noted with a  reservoir anterior to the right aspect of the bladder. No free fluid,  free air or adenopathy is seen.     No suspicious osseous lesions are identified.      Impression    IMPRESSION:      Hepatic steatosis.    LEONARDA FRANKLIN MD         SYSTEM ID:  [id number]   XR Chest 1 View    Narrative    PROCEDURE:  XR CHEST 1 VIEW    HISTORY: hypoxia,fever. .    COMPARISON:  10/3/2023    FINDINGS:    The  cardiomediastinal contours are stable.  No focal consolidation, effusion or pneumothorax.      Impression    IMPRESSION:  Stable chest.      LEONARDA FRANKLIN MD         SYSTEM ID:  [id number]   CT Head w/o Contrast    Narrative    PROCEDURE: CT HEAD W/O CONTRAST     HISTORY: headache, tachy, fever, here with early DKA, nasuea.;  Headache; Acute HA (< 3 months), no complicating features.    COMPARISON: None.    TECHNIQUE:  Helical images of the head from the foramen magnum to the  vertex were obtained without contrast. This CT exam was performed  using one or more the following dose reduction techniques: automated  exposure control, adjustment of the mA and/or kV according to patient  size, and/or iterative reconstruction technique.    FINDINGS: The ventricles and sulci are normal in volume. No acute  intracranial hemorrhage, mass effect, midline shift, hydrocephalus or  basilar cystern effacement are present.    The grey-white matter interface is preserved.    The calvarium is intact. The mastoid air cells are clear.  Scattered  ethmoid mucosal thickening is seen.      Impression    IMPRESSION: No acute intracranial hemorrhage or CT evidence of acute  transcortical ischemia.      LEONARDA FRANKLIN MD         SYSTEM ID:  [id number]       Discharge Medications   Current Discharge Medication List        CONTINUE these medications which have NOT CHANGED    Details   atorvastatin (LIPITOR) 20 MG tablet TAKE 1 TABLET DAILY  Qty: 90 tablet, Refills: 0    Associated Diagnoses: Mixed hyperlipidemia      azithromycin (ZITHROMAX) 250 MG tablet As directed  Qty: 6 tablet, Refills: 0    Associated Diagnoses: Bronchopneumonia      cefuroxime (CEFTIN) 500 MG tablet Take 1 tablet (500 mg) by mouth 2 times daily  Qty: 14 tablet, Refills: 0    Associated Diagnoses: Bronchopneumonia      Continuous Blood Gluc  (DEXCOM G6 ) JUSTEN       Continuous Blood Gluc Sensor (DEXCOM G6 SENSOR) MISC USE ONE SENSOR EVERY 10  DAYS, CHANGE EVERY 10 DAYS  Qty: 9 each, Refills: 4    Associated Diagnoses: Type 1 diabetes mellitus without complication (H)      Continuous Blood Gluc Transmit (DEXCOM G6 TRANSMITTER) MISC 1 each every 3 months Change every 3 months.  Qty: 1 each, Refills: 4    Associated Diagnoses: Type 1 diabetes mellitus without complication (H)      escitalopram (LEXAPRO) 20 MG tablet TAKE 1 TABLET DAILY  Qty: 90 tablet, Refills: 3    Associated Diagnoses: Depression with anxiety      gabapentin (NEURONTIN) 300 MG capsule 1-2 capsules at night as needed  Qty: 180 capsule, Refills: 3    Associated Diagnoses: Burning sensation of feet      insulin aspart (NOVOLOG VIAL) 100 UNITS/ML vial Uses 150 units daily in his pump  Qty: 50 mL, Refills: 4    Associated Diagnoses: Type 1 diabetes mellitus without complication (H)      Insulin Infusion Pump (T: SLIM X2 INS /CONTROL 7.4) JUSTEN       Insulin Infusion Pump Supplies (AUTOSOFT 90 INFUSION SET) MISC Inject 1 each Subcutaneous every 48 hours  Qty: 45 each, Refills: 4    Associated Diagnoses: Type 1 diabetes mellitus without complication (H)      Insulin Infusion Pump Supplies (T:SLIM X2 3ML CARTRIDGE) MISC Inject 1 each Subcutaneous every 48 hours  Qty: 45 each, Refills: 3    Associated Diagnoses: Type 1 diabetes mellitus without complication (H)      insulin lispro (HUMALOG) 100 UNIT/ML vial To be used with insulin pump total daily dose 150 units  Qty: 140 mL, Refills: 4    Associated Diagnoses: Type 1 diabetes mellitus without complication (H)      lisinopril (ZESTRIL) 5 MG tablet TAKE 1 TABLET BY MOUTH EVERY DAY  Qty: 90 tablet, Refills: 3    Associated Diagnoses: Type 1 diabetes mellitus without complication (H)      !! predniSONE (DELTASONE) 10 MG tablet       !! predniSONE (DELTASONE) 20 MG tablet Take 20 mg by mouth 2 times daily      sulfamethoxazole-trimethoprim (BACTRIM DS) 800-160 MG tablet Take 1 tablet by mouth three times a week       !! - Potential duplicate  medications found. Please discuss with provider.        Allergies   No Known Allergies

## 2024-05-27 LAB
A PHAGOCYTOPH IGG TITR SER IF: NORMAL {TITER}
A PHAGOCYTOPH IGM TITR SER IF: NORMAL {TITER}
ALBUMIN SERPL BCG-MCNC: 3.2 G/DL (ref 3.5–5.2)
ALP SERPL-CCNC: 69 U/L (ref 40–150)
ALT SERPL W P-5'-P-CCNC: 25 U/L (ref 0–70)
ANION GAP SERPL CALCULATED.3IONS-SCNC: 7 MMOL/L (ref 7–15)
AST SERPL W P-5'-P-CCNC: 20 U/L (ref 0–45)
BACTERIA CSF CULT: NORMAL
BASOPHILS # BLD AUTO: 0 10E3/UL (ref 0–0.2)
BASOPHILS NFR BLD AUTO: 1 %
BILIRUB SERPL-MCNC: 0.5 MG/DL
BUN SERPL-MCNC: 6.3 MG/DL (ref 6–20)
C GATTII+NEOFOR DNA CSF QL NAA+NON-PROBE: NEGATIVE
CALCIUM SERPL-MCNC: 8.4 MG/DL (ref 8.6–10)
CHLORIDE SERPL-SCNC: 106 MMOL/L (ref 98–107)
CMV DNA CSF QL NAA+NON-PROBE: NEGATIVE
CREAT SERPL-MCNC: 0.72 MG/DL (ref 0.67–1.17)
DEPRECATED HCO3 PLAS-SCNC: 28 MMOL/L (ref 22–29)
E COLI K1 AG CSF QL: NEGATIVE
EGFRCR SERPLBLD CKD-EPI 2021: >90 ML/MIN/1.73M2
EOSINOPHIL # BLD AUTO: 0.2 10E3/UL (ref 0–0.7)
EOSINOPHIL NFR BLD AUTO: 3 %
ERYTHROCYTE [DISTWIDTH] IN BLOOD BY AUTOMATED COUNT: 12.9 % (ref 10–15)
EV RNA SPEC QL NAA+PROBE: NEGATIVE
GLUCOSE SERPL-MCNC: 113 MG/DL (ref 70–99)
GP B STREP DNA CSF QL NAA+NON-PROBE: NEGATIVE
GRAM STAIN RESULT: NORMAL
GRAM STAIN RESULT: NORMAL
HAEM INFLU DNA CSF QL NAA+NON-PROBE: NEGATIVE
HCT VFR BLD AUTO: 34.2 % (ref 40–53)
HGB BLD-MCNC: 12.7 G/DL (ref 13.3–17.7)
HHV6 DNA CSF QL NAA+NON-PROBE: NEGATIVE
HSV1 DNA CSF QL NAA+NON-PROBE: NEGATIVE
HSV2 DNA CSF QL NAA+NON-PROBE: NEGATIVE
IMM GRANULOCYTES # BLD: 0 10E3/UL
IMM GRANULOCYTES NFR BLD: 0 %
L MONOCYTOG DNA CSF QL NAA+NON-PROBE: NEGATIVE
LYMPHOCYTES # BLD AUTO: 1.3 10E3/UL (ref 0.8–5.3)
LYMPHOCYTES NFR BLD AUTO: 16 %
MCH RBC QN AUTO: 31.7 PG (ref 26.5–33)
MCHC RBC AUTO-ENTMCNC: 37.1 G/DL (ref 31.5–36.5)
MCV RBC AUTO: 85 FL (ref 78–100)
MONOCYTES # BLD AUTO: 0.9 10E3/UL (ref 0–1.3)
MONOCYTES NFR BLD AUTO: 10 %
N MEN DNA CSF QL NAA+NON-PROBE: NEGATIVE
NEUTROPHILS # BLD AUTO: 6.1 10E3/UL (ref 1.6–8.3)
NEUTROPHILS NFR BLD AUTO: 71 %
NRBC # BLD AUTO: 0 10E3/UL
NRBC BLD AUTO-RTO: 0 /100
PARECHOVIRUS A RNA CSF QL NAA+NON-PROBE: NEGATIVE
PLATELET # BLD AUTO: 160 10E3/UL (ref 150–450)
POTASSIUM SERPL-SCNC: 3.3 MMOL/L (ref 3.4–5.3)
POTASSIUM SERPL-SCNC: 3.7 MMOL/L (ref 3.4–5.3)
PROT SERPL-MCNC: 5.2 G/DL (ref 6.4–8.3)
RBC # BLD AUTO: 4.01 10E6/UL (ref 4.4–5.9)
S PNEUM DNA CSF QL NAA+NON-PROBE: NEGATIVE
SODIUM SERPL-SCNC: 141 MMOL/L (ref 135–145)
VZV DNA CSF QL NAA+NON-PROBE: NEGATIVE
WBC # BLD AUTO: 8.6 10E3/UL (ref 4–11)

## 2024-05-27 PROCEDURE — C9113 INJ PANTOPRAZOLE SODIUM, VIA: HCPCS | Performed by: HOSPITALIST

## 2024-05-27 PROCEDURE — 85025 COMPLETE CBC W/AUTO DIFF WBC: CPT | Performed by: HOSPITALIST

## 2024-05-27 PROCEDURE — 94640 AIRWAY INHALATION TREATMENT: CPT

## 2024-05-27 PROCEDURE — 84132 ASSAY OF SERUM POTASSIUM: CPT | Performed by: HOSPITALIST

## 2024-05-27 PROCEDURE — 999N000157 HC STATISTIC RCP TIME EA 10 MIN

## 2024-05-27 PROCEDURE — 250N000013 HC RX MED GY IP 250 OP 250 PS 637: Performed by: HOSPITALIST

## 2024-05-27 PROCEDURE — 99233 SBSQ HOSP IP/OBS HIGH 50: CPT | Performed by: HOSPITALIST

## 2024-05-27 PROCEDURE — 82040 ASSAY OF SERUM ALBUMIN: CPT | Performed by: HOSPITALIST

## 2024-05-27 PROCEDURE — 250N000012 HC RX MED GY IP 250 OP 636 PS 637: Performed by: HOSPITALIST

## 2024-05-27 PROCEDURE — 250N000011 HC RX IP 250 OP 636: Mod: JZ | Performed by: HOSPITALIST

## 2024-05-27 PROCEDURE — 36415 COLL VENOUS BLD VENIPUNCTURE: CPT | Performed by: HOSPITALIST

## 2024-05-27 PROCEDURE — 120N000001 HC R&B MED SURG/OB

## 2024-05-27 RX ORDER — METOCLOPRAMIDE 5 MG/1
10 TABLET ORAL
Status: DISCONTINUED | OUTPATIENT
Start: 2024-05-27 | End: 2024-05-28 | Stop reason: HOSPADM

## 2024-05-27 RX ORDER — PANTOPRAZOLE SODIUM 20 MG/1
20 TABLET, DELAYED RELEASE ORAL
Status: DISCONTINUED | OUTPATIENT
Start: 2024-05-28 | End: 2024-05-28 | Stop reason: HOSPADM

## 2024-05-27 RX ORDER — POTASSIUM CHLORIDE 1500 MG/1
40 TABLET, EXTENDED RELEASE ORAL ONCE
Status: COMPLETED | OUTPATIENT
Start: 2024-05-27 | End: 2024-05-27

## 2024-05-27 RX ADMIN — Medication 1500 MG: at 02:32

## 2024-05-27 RX ADMIN — FLUTICASONE FUROATE AND VILANTEROL TRIFENATATE 1 PUFF: 200; 25 POWDER RESPIRATORY (INHALATION) at 07:53

## 2024-05-27 RX ADMIN — ATORVASTATIN CALCIUM 20 MG: 20 TABLET, FILM COATED ORAL at 08:04

## 2024-05-27 RX ADMIN — ACETAMINOPHEN 650 MG: 325 TABLET, FILM COATED ORAL at 05:41

## 2024-05-27 RX ADMIN — ESCITALOPRAM OXALATE 20 MG: 20 TABLET ORAL at 08:05

## 2024-05-27 RX ADMIN — PANTOPRAZOLE SODIUM 40 MG: 40 INJECTION, POWDER, FOR SOLUTION INTRAVENOUS at 08:04

## 2024-05-27 RX ADMIN — METOCLOPRAMIDE 10 MG: 5 TABLET ORAL at 11:58

## 2024-05-27 RX ADMIN — ACETAMINOPHEN 650 MG: 325 TABLET, FILM COATED ORAL at 16:03

## 2024-05-27 RX ADMIN — METOCLOPRAMIDE 10 MG: 5 TABLET ORAL at 16:52

## 2024-05-27 RX ADMIN — PREDNISONE 10 MG: 5 TABLET ORAL at 08:04

## 2024-05-27 RX ADMIN — METOCLOPRAMIDE HYDROCHLORIDE 10 MG: 5 INJECTION INTRAMUSCULAR; INTRAVENOUS at 08:04

## 2024-05-27 RX ADMIN — POTASSIUM CHLORIDE 40 MEQ: 1500 TABLET, EXTENDED RELEASE ORAL at 11:58

## 2024-05-27 RX ADMIN — CEFTRIAXONE SODIUM 2 G: 2 INJECTION, SOLUTION INTRAVENOUS at 05:36

## 2024-05-27 ASSESSMENT — ACTIVITIES OF DAILY LIVING (ADL)
ADLS_ACUITY_SCORE: 20

## 2024-05-27 NOTE — PROGRESS NOTES
[facility]    Medicine Progress Note - Hospitalist Service    Date of Admission:  5/24/2024    Assessment & Plan    Hospital course:  Patient is a type I diabetic with Humulin insulin pump, has established diagnosis of morbid obesity, depression anxiety, umbilical hernia,.  Patient recently was diagnosed with pulmonary sarcoidosis 2023 and has been on steroids       taper.  He previously did take Bactrim.  He is also used THC Gummies in past.  Patient woke up on Friday at 4 am in the morning with nausea and vomiting and dry heaving and noticed that his insulin pump was low and insulin but his wife was unable to refill this for him.  Patient was not feeling well so he disconnected the insulin pump and he did do subcu insulin during the day on Friday.  He presented to the emergency room on Friday evening because he was not feeling well and, he was found to have anion gap of 22, bicarb of 20, pH venous 7.32, glucose 499, his lactic acid was 4.2, beta-2 hydroxybutyrate 3.7, abdominal pain.  CT abdomen was done with no acute findings, read from ct was Limited images through the lung bases demonstrate no focal consolidation or mass.  The liver demonstrates no mass or intrahepatic biliary ductal dilatation. Hepatic steatosis is questioned. The gallbladder is unremarkable. The spleen is enlarged measuring 13.6 cm in length. The pancreas and adrenal glands are unremarkable. Symmetric nephrograms are present without hydronephrosis. There is no abdominal aortic aneurysm. The bowel is normal in caliber. A penile prosthesis is noted with a reservoir anterior to the right aspect of the bladder. No free fluid, free air or adenopathy is seen.  Patient was given total of 5 L of normal saline bolus in the emergency room and was started on maintenance fluids.  Patient initially did not make any urine but later did, urine analysis was done showing protein and glucose present in the urine no signs of urinary tract infection.   Patient did complain of pounding headache on admission and then improved with fluid administration and Tylenol.  Patient did have a fever of 101 in the hospital, tachycardia heart rate 120s, white blood cell count of 18.6.  He also did require 2 L of oxygen, chest x-ray was done showing no acute findings, respiratory viral panel was done which was negative blood cultures were collected and empiric antibiotics were started vancomycin Rocephin on May 25. With the Initiation of his insulin pump his beta-2 hydroxybutyrate worsened but it was suspected likely secondary from starvation ketosis as he is not eating much.  Patient's blood cultures have been no growth to date.  He also did have Lyme's and anaplasmosis and babesiosis labs done which were pending.  Patient was transition from subcu insulin to his home insulin pump with improvement in his sugars which have been between 100-200.  His anion gap is 4, CO2 28.  He also did receive a dose of hydrocortisone due to having blood pressures in the 90s systolics possible stress reaction.  He was restarted on his home prednisone which is 10 mg a day.  On May 26 patient complained of worsening headache which she described in the front of his head and was pounding.  Patient did undergo CT scan of his head showing No acute intracranial hemorrhage or CT evidence of acute transcortical ischemia.  Patient was discussed with his wife who is bedside and we updated regarding current plan and care.  Patient was also discussed with neurology at [facility]'s Dr. Castañeda recommendation was for lumbar puncture and collecting a acte lvl and   sarcoid cell cytology and rule out meningitis.  Patient also may benefit from MRI of the brain which was more ideal CT scan by unfortunately we do not have this available until earliest Tuesday.  Also patiens wife did request that he is evaluated by his pulmonologist as he has sarcoidosis and neurologic sarcoidosis was on differential as per  neurology and he will need further workup.  Patient was discussed with CRNA  and he  had LP done  on 5/27, opening pressure was reported to be elevated at 36, RBC 8,gram stain so far no organism, CSF protein 49 and CSF glucose 100.  I placed to hold and freeze tube #3 incase more tests needed to be added on until 6/1/24 Patient was accepted to go to [facility] by Dr. Dr. Castaneda higher level of care evaluation with neurology and to rule out neurologic sarcoidosis but it was reported by Dr. Guerra that [facility] wanted an update when the EMS team arrived here and at that time discussion was held with the hospitalist there and the neurologist and they did not recommend to transfer the patient to [facility] and [facility] declined transfer.  Patient was seen on May 27 he was feeling better I did start him on Reglan which his hope to resolve with his help to resolve his dry heaves likely has underlying gastroparesis which will need to be followed up outpatient.  Also he has not had fevers so I did discuss with him regarding stopping vancomycin Rocephin and monitoring for 24 hours and following up on lumbar puncture culture tomorrow and likely discharge home.  He Did agree he did not want to transfer at this time.    # Early DKA resolved  Lactic acidosis resolved  Starvation ketosis resolved   Right arm swelling- resolved        # Intractable nausea and vomiting resolved  ? Gastro paresis  Abdominal pain resolved  Prn zofran  Prn compazine  Prn benadryl  Ppi scheduled  Reglan schedule    Headache mild  Prn tylenol  Neurologic sarcoidosis?   S/p LP with elevated opening pressure at 36   Follow meningitis panel and cultures  Follow CSF ace lvl  Stopped vanco and rocephin on 5/27 if no fever for 24 and cultures negative likely discharge home    Fevers of unknown  origin  Monitor cultures     # Pulmonary sarcoidosis   On prednisone    # Diabetes mellitus type 1 uncontrolled with hyperglycemia   -home insulin pump  Poct with  "meals    THC use( gumies)  -UDS is negative     Hepatic steatosis.   Avoid fatty foods          Diet: Consistent Carbohydrate Diet Moderate Consistent Carb (60 g CHO per Meal) Diet    DVT Prophylaxis: Pneumatic Compression Devices  Leblanc Catheter: Not present  Lines: None     Cardiac Monitoring: None  Code Status: Full Code      Clinically Significant Risk Factors        # Hypokalemia: Lowest K = 3.3 mmol/L in last 2 days, will replace as needed   # Hypocalcemia: Lowest Ca = 8.3 mg/dL in last 2 days, will monitor and replace as appropriate     # Hypoalbuminemia: Lowest albumin = 3.2 g/dL at 5/27/2024  5:35 AM, will monitor as appropriate            # Obesity: Estimated body mass index is 37.14 kg/m  as calculated from the following:    Height as of this encounter: 1.727 m (5' 8\").    Weight as of this encounter: 110.8 kg (244 lb 4.3 oz)., PRESENT ON ADMISSION            Disposition Plan     Medically Ready for Discharge: Anticipated Tomorrow             Silas Kendrick DO  Hospitalist Service  [facility]  Securely message with [employer] (more info)  Text page via [facility] Paging/Directory   ______________________________________________________________________    Interval History      Patient was seen for medical rounds this morning.  Patient denying chest pain shortness of breath his headache is still there but is much better.  Patient denies any nausea or vomiting.  He did have pizza last night.  Patient was discussed last night by the night provider with the hospitalist and neurology at [facility] and they declined transfer because it was felt that the patient can be managed here.  Lumbar puncture was also done yesterday and awaiting results.  I did discuss with him regarding stopping antibiotics today and monitoring for 24 hours if he is afebrile and if the cultures are negative likely discharge home tomorrow.  Likely his dry heaving may be was related to gastroparesis that is undiagnosed he is on " Reglan scheduled.  I did change his medication to oral today.    Physical Exam   Vital Signs: Temp: 97.9  F (36.6  C) Temp src: Tympanic BP: 128/80 Pulse: 84   Resp: 16 SpO2: 93 % O2 Device: None (Room air)    Weight: 244 lbs 4.31 oz    Physical Exam  Constitutional:       Appearance: He is obese.   HENT:      Right Ear: External ear normal.      Left Ear: External ear normal.      Mouth/Throat:      Pharynx: Oropharynx is clear.   Eyes:      Conjunctiva/sclera: Conjunctivae normal.   Cardiovascular:      Rate and Rhythm: Normal rate.   Pulmonary:      Effort: Pulmonary effort is normal.   Abdominal:      General: Abdomen is flat.   Musculoskeletal:         General: No swelling.   Skin:     Coloration: Skin is not jaundiced.   Neurological:      Mental Status: He is alert. Mental status is at baseline.         Medical Decision Making       50 MINUTES SPENT BY ME on the date of service doing chart review, history, exam, documentation & further activities per the note.      Data     I have personally reviewed the following data over the past 24 hrs:    8.6  \   12.7 (L)   / 160     141 106 6.3 /  113 (H)   3.3 (L) 28 0.72 \     ALT: 25 AST: 20 AP: 69 TBILI: 0.5   ALB: 3.2 (L) TOT PROTEIN: 5.2 (L) LIPASE: N/A       Imaging results reviewed over the past 24 hrs:   No results found for this or any previous visit (from the past 24 hour(s)).

## 2024-05-27 NOTE — ANESTHESIA PROCEDURE NOTES
"Lumbar puncture Procedure Note    Pre-Procedure   Staff -        CRNA: Elizabeth Gonzáles APRN CRNA       Performed By: CRNA       Location: floor       Procedure Start/Stop Times: 5/26/2024 8:40 PM and 5/26/2024 8:47 PM       Pre-Anesthestic Checklist: patient identified, IV checked, risks and benefits discussed, informed consent, monitors and equipment checked, pre-op evaluation and at physician/surgeon's request  Timeout:       Correct Patient: Yes        Correct Procedure: Yes        Correct Site: Yes        Correct Position: Yes   Procedure Documentation  Procedure: lumbar puncture       Patient Position: sitting       Patient Prep/Sterile Barriers: sterile gloves, mask, patient draped       Skin prep: Chloraprep       Insertion Site: L3-4. (midline approach).       Needle Gauge: 20.        Needle Length (Inches): 3.5        Spinal Needle Type: QuinckeNo introducer used       # of attempts: 1 and  # of redirects:  1    Assessment/Narrative         Paresthesias: Resolved.       LP fluid removal amount (ml): 5.7       CSF fluid: clear.       Opening pressure was 36 cmH2O while  Sitting.      Medication(s) Administered   Medication Administration Time: 5/26/2024 8:40 PM     Comments:  Used 1% Lido 3ml at insertion site for skin wheel  No complications  First advance caused right paresthesia, immediately redirected and got to space. Paresthesia down right resolved per patient.  Patient tolerated well.  Wife remained at bedside.   Sterile procedure, RN assist, RN also with mask on.   Patient awaiting on transfer to [facility]      FOR [facility] ([facility]) ONLY:   Pain Team Contact information: please page the Pain Team Via [employer]. Search \"Pain\". During daytime hours, please page the attending first. At night please page the resident first.      "

## 2024-05-27 NOTE — PROGRESS NOTES
Received call from [facility] about transferring the patient.  Transfer was initiated during the day by attending physician.  [facility] requested updated.  They requested lumbar puncture to be done before transferring the patient.  Lumbar puncture was done a it reported an opening pressure of 36 while sitting, red blood cell of 8, total nucleated cell of 3, protein of 49.6.  Those results were communicated to [facility].  The hospitalits from [facility] touched base with the neurologist and after evaluation they decided there is no need for patient to be transferred to their facility and canceled the transfer.

## 2024-05-27 NOTE — PLAN OF CARE
Per Dr. Goodman transfer was canceled. Pt and spouse made aware. Asked if they had any questions and offered to speak with provider. They declined at this time.

## 2024-05-27 NOTE — PLAN OF CARE
"Pt A&O, VSS, afebrile. Has complaints of a headache today, PRN tylenol given. Intermittent nausea, receiving sched Reglan. IV SL. Potassium replaced. Patient verbalizes overall \"Feeling better today.\" BS as charted. Up walking halls independently with wife. Free from falls, call light within reach. Makes his needs known.     Face to face report given with opportunity to observe patient.    Report given to Mario Cobb RN   5/27/2024  7:17 PM    "

## 2024-05-27 NOTE — PROGRESS NOTES
Medical record and Anish Score reviewed. Checked with care team.  No apparent needs noted at this time. Care Transitions will remain available if needs arise.  GAUTAM Coronado @[phone] May 27, 2024

## 2024-05-27 NOTE — ANESTHESIA POSTPROCEDURE EVALUATION
Patient: Yaya Mg    Procedure: * No procedures listed *       Anesthesia Type:  No value filed.    Note:  Disposition: Inpatient   Postop Pain Control:    PONV:    Neuro/Psych:             Sign Out: Acceptable/Baseline neuro status   Airway/Respiratory:    CV/Hemodynamics:    Other NRE: NONE   DID A NON-ROUTINE EVENT OCCUR? No    Event details/Postop Comments:  Dr Gutierrez hospitalist notified of 36 ICP reading while sitting, he plans to update [facility]. Patient neuro status remains at pre-procedure baseline. Alert and oriented. No changes in mentation. Patient back to bed and RN remained in room. No procedure complications noted.            Last vitals:  Vitals:    05/26/24 1226 05/26/24 1818 05/26/24 1929   BP: 130/72 136/79 137/73   Pulse: 77 76 91   Resp: 17 18 16   Temp: 99  F (37.2  C) 98.9  F (37.2  C) 98.2  F (36.8  C)   SpO2: 92% 93% 95%       Electronically Signed By: THOMAS Hall CRNA  May 26, 2024  9:09 PM

## 2024-05-27 NOTE — ANESTHESIA CARE TRANSFER NOTE
Patient: Yaya Mg    Procedure: * No procedures listed *       Diagnosis: * No pre-op diagnosis entered *  Diagnosis Additional Information: No value filed.    Anesthesia Type:   No value filed.     Note:                      Comments: Care of patient never taken over, EPIC requires note. Procedure only, no anesthetic delivered.         Vitals:  Vitals Value Taken Time   BP     Temp     Pulse     Resp     SpO2         Electronically Signed By: THOMAS Hall CRNA  May 26, 2024  9:08 PM

## 2024-05-27 NOTE — ANESTHESIA PREPROCEDURE EVALUATION
Anesthesia Pre-Procedure Evaluation    Patient: Yaya gM   MRN: [id number] : 1975        Procedure :           Past Medical History:   Diagnosis Date     Anxiety      Depressive disorder      Hypertension      Type 1 diabetes (H)       Past Surgical History:   Procedure Laterality Date     APPENDECTOMY  1985     BACK SURGERY  2010    lumbar calcium deposits removed     CATARACT EXTRACTION Right      COLONOSCOPY N/A 2023    Procedure: COLONOSCOPY with polypectomy;  Surgeon: Gildardo Nichols MD;  Location: [facility] OR     IMPLANT PROSTHESIS PENIS INFLATABLE N/A 1/3/2023    Procedure: INSERTION of INFLATABLE PENILE PROSTHESIS;  Surgeon: Amandeep Hill MD;  Location: [facility] OR      No Known Allergies   Social History     Tobacco Use     Smoking status: Never     Passive exposure: Never     Smokeless tobacco: Never   Substance Use Topics     Alcohol use: Yes     Comment: socially      Wt Readings from Last 1 Encounters:   24 110.8 kg (244 lb 4.3 oz)        Anesthesia Evaluation   Pt has had prior anesthetic.     No history of anesthetic complications       ROS/MED HX  ENT/Pulmonary:     (+)     RAMAKRISHNA risk factors, snores loudly,  obese,                                Neurologic: Comment: Severe headache since Friday    (+)    peripheral neuropathy,                            Cardiovascular:     (+) Dyslipidemia hypertension- Peripheral Vascular Disease-   -  - -   Taking blood thinners (Received lovenox injection pm 24)                                   METS/Exercise Tolerance:     Hematologic: Comments: Upper extremity U/s Rule out DVT ordered but not done yet due to staff limitations. Swelling has gone down a lot per wife and nurse, good CMS noted. Possibly IV infiltration per nursing      Musculoskeletal: Comment: Back pain      GI/Hepatic:     (+)             liver disease,       Renal/Genitourinary:       Endo: Comment: Sarcoidosis on steroids was on 40mg daily per   "diagnosed six months  Sarcoidosis has caused severe physical decline, unable to walk a block now due to shortness of breath and tiredness. Dr. Main from [facility]. 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     (+) type I DM,    Using insulin, - using insulin pump.  Previously admitted for DM/DKA.    Chronic steroid usage for   Obesity,       Psychiatric/Substance Use:     (+) psychiatric history anxiety and depression       Infectious Disease:     (+) Recent Fever,           Malignancy:  - neg malignancy ROS     Other:      (+)  , H/O Chronic Pain,         Physical Exam    Airway        Mallampati: II   TM distance: > 3 FB   Neck ROM: full   Mouth opening: > 3 cm    Respiratory Devices and Support         Dental           Cardiovascular   cardiovascular exam normal       Rhythm and rate: regular and normal     Pulmonary   pulmonary exam normal        breath sounds clear to auscultation       OUTSIDE LABS:  CBC:   Lab Results   Component Value Date    WBC 8.0 05/26/2024    WBC 14.9 (H) 05/25/2024    HGB 12.3 (L) 05/26/2024    HGB 12.7 (L) 05/25/2024    HCT 34.1 (L) 05/26/2024    HCT 35.4 (L) 05/25/2024     (L) 05/26/2024     05/25/2024     BMP:   Lab Results   Component Value Date     05/26/2024     (L) 05/25/2024    POTASSIUM 3.5 05/26/2024    POTASSIUM 4.2 05/25/2024    CHLORIDE 105 05/26/2024    CHLORIDE 103 05/25/2024    CO2 28 05/26/2024    CO2 21 (L) 05/25/2024    BUN 13.5 05/26/2024    BUN 16.0 05/25/2024    CR 0.76 05/26/2024    CR 0.75 05/25/2024     (H) 05/26/2024     (H) 05/25/2024     COAGS: No results found for: \"PTT\", \"INR\", \"FIBR\"  POC: No results found for: \"BGM\", \"HCG\", \"HCGS\"  HEPATIC:   Lab Results   Component Value Date    ALBUMIN 3.8 05/25/2024    PROTTOTAL 5.6 (L) 05/25/2024    ALT 30 05/25/2024    AST 18 05/25/2024    ALKPHOS 89 05/25/2024    BILITOTAL 1.5 (H) 05/25/2024     OTHER:   Lab Results   Component Value Date    " LACT 1.1 05/25/2024    A1C 7.1 (H) 05/25/2024    SONIA 8.5 (L) 05/26/2024    LIPASE 7 (L) 05/25/2024    TSH 0.64 05/25/2024    CRP <2.9 05/24/2022    SED 7 10/03/2023       Anesthesia Plan    ASA Status:  3    - Procedure: Procedure only, no anesthetic delivered                    Consents    Anesthesia Plan(s) and associated risks, benefits, and realistic alternatives discussed. Questions answered and patient/representative(s) expressed understanding.     - Discussed: Risks, Benefits and Alternatives for the PROCEDURE were discussed     - Discussed with:  Patient            Postoperative Care            Comments:    Other Comments: Lumbar puncture 24 hours past lovenox last dose.   Hospitalist contacted anesthesia after he discussed case with neurology and Infections disease at [facility]. [facility] requesting LP and hospitalist.   Will attempt to get ICP pressure from kit reader.   Patient waiting transfer to [facility]  Discussed risks and benefits of lumbar puncture with patient including but not limited to sore back, infection, hematoma, spinal headache, nerve damage, inability to obtain sample. Pt wishes to proceed. Written paper consent witnessed by floor RN as MARY has EPIC limitations for procedural consents.            THOMAS Hall CRNA    I have reviewed the pertinent notes and labs in the chart from the past 30 days and (re)examined the patient.  Any updates or changes from those notes are reflected in this note.

## 2024-05-27 NOTE — PLAN OF CARE
Face to face report given with opportunity to observe patient.    Report given to TRACI Coughlin RN   5/27/2024  7:02 AM

## 2024-05-27 NOTE — PLAN OF CARE
Goal Outcome Evaluation:    Pt is alert and oriented. Reports minimal to moderate pain for headaches. Oxy given. Pt deems more effective then tylenol. States has been doing cool wash clothes on neck, that is slightly effective. Nausea still intermittent. No vomiting. Ativan given for nausea and pt sates this was most effective and took all the nausea away. LP performed. Pt tolerated well. Results showed no growth. Pt was to be transferred to [facility] but was not admitted after LP results. Pt BG stable. Appetite is poor due to nausea. VSS. Assessment as charted. Call light in reach, uses appropriately.

## 2024-05-27 NOTE — PROGRESS NOTES
[facility]    Medicine Progress Note - Hospitalist Service    Date of Admission:  5/24/2024    Assessment & Plan   Hospital course:  Patient is a type I diabetic with Humulin insulin pump, has established diagnosis of morbid obesity, depression anxiety, umbilical hernia,.  Patient recently was diagnosed with pulmonary sarcoidosis and has been on steroids now on a taper.  He previously did take Bactrim.  He is also used THC Gummies.  Patient woke up on Friday at 4 am in the morning with nausea and vomiting and dry heaving and noticed that his insulin pump was low and insulin but his wife was unable to refill this for him.  Patient was not feeling well so he disconnected the insulin pump and he did do subcu insulin during the day on Friday.  He presented to the emergency room on Friday evening because he was not feeling well and, he was found to have anion gap of 22, bicarb of 20, pH venous 7.32, glucose 499, his lactic acid was 4.2, beta-2 hydroxybutyrate 3.7, abdominal pain.  CT abdomen was done with no acute findings, read from ct was Limited images through the lung bases demonstrate no focal consolidation or mass.  The liver demonstrates no mass or intrahepatic biliary ductal dilatation. Hepatic steatosis is questioned. The gallbladder is unremarkable. The spleen is enlarged measuring 13.6 cm in length. The pancreas and adrenal glands are unremarkable. Symmetric nephrograms are present without hydronephrosis. There is no abdominal aortic aneurysm. The bowel is normal in caliber. A penile prosthesis is noted with a reservoir anterior to the right aspect of the bladder. No free fluid, free air or adenopathy is seen.  Patient was given total of 5 L of normal saline bolus in the emergency room and was started on maintenance fluids.  Patient initially did not make any urine but later did, urine analysis was done showing protein and glucose present in the urine no signs of urinary tract infection.  Patient  did complain of pounding headache on admission and then improved with fluid administration and Tylenol.  Patient did have a fever of 101 in the hospital, tachycardia heart rate 120s, white blood cell count of 18.6.  He also did require 2 L of oxygen, chest x-ray was done showing no acute findings, respiratory viral panel was done which was negative blood cultures were collected and empiric antibiotics were started vancomycin Rocephin on May 25.-Initiation of his insulin pump his beta-2 hydroxybutyrate worsened but it was suspected likely secondary from starvation ketosis as he is not eating much.  Patient's blood cultures have been no growth to date.  He also did have Lyme's and anaplasmosis and babesiosis labs done which were pending.  Patient was transition from subcu insulin to his home insulin pump with improvement in his sugars which have been between 100-200.  His anion gap is 4, CO2 28.  He also did receive a dose of hydrocortisone due to having blood pressures in the 90s systolics possible stress reaction.  He was restarted on his home prednisone which is 10 mg a day.  On May 26 patient complained of worsening headache which she described in the front of his head and was pounding.  Patient did undergo CT scan of his head showing No acute intracranial hemorrhage or CT evidence of acute transcortical ischemia.  Patient was discussed with his wife who is bedside and we updated regarding current plan and care.  Patient was also discussed with neurology at [facility] Dr. Castañeda recommendation was for lumbar puncture and collecting a 6 level sarcoid cell cytology and rule out meningitis.  Patient also may benefit from MRI of the brain which was more ideal CT scan by unfortunately we do not have this available until earliest Tuesday.  Also did request that he is evaluated by his pulmonologist as he has sarcoidosis and neurologic sarcoidosis was on differential as per neurology and he will need further workup.   Patient was discussed with CRNA at the hospital here in [place] but were unable to do lumbar puncture due to patient having dry heaving and also he received Lovenox night at 8 PM and will need to wait 24 hours as per KACY CRNA received with lumbar puncture.  I did discuss the findings and care with the patient and he agreed with the plan.  I did call over to [facility] and discussed with intake provider Dr. Castaneda he was accepted for transfer once they have a bed available.  Paperwork is all filled out he may transfer by BLS.  Will attempt to do the lumbar puncture as per recommendation of soon as possible which should be at 8 PM tonight.     LP orders were placed   Also Order placed to hold and freeze tube #3 incase more tests needed to be added on until 6/1/24        # Early DKA resolved  Lactic acidosis resolved  Starvation ketosis resolved   Right arm swelling- improved         # Intractable nausea and vomiting    ? Gastro paresis  Abdominal pain resolved  Prn zofran  Prn compazine  Prn benadryl  Ppi scheduled  Reglan scheduled   IV fluids    Headache mild  Prn tylenol  Neurologic sarcoidosis?     LP to be done , CRN aware. Patient agreed   Follow meningitis panel and cultures  Follow CSF ace lvl  On vanco and rocephin     Fevers of unknown  origin  Monitor cultures     # Pulmonary sarcoidosis   On prednisone    # Diabetes mellitus type 1 uncontrolled with hyperglycemia   -home insulin pump  Poct with meals    THC use( gumies)  -UDS is negative     Hepatic steatosis.   Avoid fatty foods          Diet: Consistent Carbohydrate Diet Moderate Consistent Carb (60 g CHO per Meal) Diet    DVT Prophylaxis: Pneumatic Compression Devices  Leblanc Catheter: Not present  Lines: None     Cardiac Monitoring: None  Code Status: Full Code      Clinically Significant Risk Factors        # Hypokalemia: Lowest K = 3.3 mmol/L in last 2 days, will replace as needed   # Hypocalcemia: Lowest Ca = 8.3 mg/dL in last 2 days, will monitor  "and replace as appropriate     # Hypoalbuminemia: Lowest albumin = 3.2 g/dL at 5/27/2024  5:35 AM, will monitor as appropriate            # Obesity: Estimated body mass index is 37.14 kg/m  as calculated from the following:    Height as of this encounter: 1.727 m (5' 8\").    Weight as of this encounter: 110.8 kg (244 lb 4.3 oz)., PRESENT ON ADMISSION            Disposition Plan     Medically Ready for Discharge:  pending transfer              Silas Kendrick DO  Hospitalist Service  [facility]  Securely message with [employer] (more info)  Text page via [facility] Paging/Directory   ______________________________________________________________________    Interval History   Was seen this morning for medical rounds.  I did see him throughout the day.  Discussed his care with him and his wife and patient has had worsening dry heaving IV fluids were restarted.  I did discuss regarding care and he wanted to be transferred to higher level of care as his pulmonologist there.  I did call and discussed with neurology at [facility] and also discussed with the hospitalist and he was accepted for transfer.  In the meantime [facility] recommend doing a lumbar puncture which we planned on doing later today but we did have to wait 24 hours as per anesthesia and they did not want the Lovenox to be in his system and wanted to be done 24 hours after the last administration which was the night before 8 PM.    Physical Exam   Vital Signs: Temp: 97.9  F (36.6  C) Temp src: Tympanic BP: 128/80 Pulse: 84   Resp: 16 SpO2: 93 % O2 Device: None (Room air)    Weight: 244 lbs 4.31 oz     Constitutional:       Appearance: He is obese.      Comments: Appears uncomfortable    HENT:      Right Ear: External ear normal.      Left Ear: External ear normal.      Nose:      Comments: Mild erythema mucosal membranes,   Deviated septum  to left      Mouth/Throat:      Pharynx: Oropharynx is clear.   Eyes:      Conjunctiva/sclera: Conjunctivae " normal.   Cardiovascular:      Rate and Rhythm: Normal rate.   Pulmonary:      Effort: Pulmonary effort is normal.   Abdominal:      General: Abdomen is flat. There is no distension.      Palpations: There is no mass.      Tenderness: There is no abdominal tenderness.   Musculoskeletal:         General: No swelling.      Comments: Mild right arm swelling    Skin:     General: Skin is warm.   Neurological:      Mental Status: He is alert.      Comments: No focal deficits      Medical Decision Making       57 MINUTES SPENT BY ME on the date of service doing chart review, history, exam, documentation & further activities per the note.      Data     I have personally reviewed the following data over the past 24 hrs:    8.6  \   12.7 (L)   / 160     141 106 6.3 /  113 (H)   3.3 (L) 28 0.72 \     ALT: 25 AST: 20 AP: 69 TBILI: 0.5   ALB: 3.2 (L) TOT PROTEIN: 5.2 (L) LIPASE: N/A       Imaging results reviewed over the past 24 hrs:   No results found for this or any previous visit (from the past 24 hour(s)).

## 2024-05-28 ENCOUNTER — TELEPHONE (OUTPATIENT)
Dept: FAMILY MEDICINE | Facility: OTHER | Age: 49
End: 2024-05-28

## 2024-05-28 VITALS
HEART RATE: 69 BPM | DIASTOLIC BLOOD PRESSURE: 82 MMHG | RESPIRATION RATE: 16 BRPM | TEMPERATURE: 99.8 F | HEIGHT: 68 IN | OXYGEN SATURATION: 96 % | WEIGHT: 244.27 LBS | SYSTOLIC BLOOD PRESSURE: 145 MMHG | BODY MASS INDEX: 37.02 KG/M2

## 2024-05-28 LAB
BASOPHILS # BLD AUTO: 0 10E3/UL (ref 0–0.2)
BASOPHILS NFR BLD AUTO: 0 %
CRP SERPL-MCNC: 11.43 MG/L
EOSINOPHIL # BLD AUTO: 0.2 10E3/UL (ref 0–0.7)
EOSINOPHIL NFR BLD AUTO: 4 %
ERYTHROCYTE [DISTWIDTH] IN BLOOD BY AUTOMATED COUNT: 12.8 % (ref 10–15)
HCT VFR BLD AUTO: 32.7 % (ref 40–53)
HGB BLD-MCNC: 12.1 G/DL (ref 13.3–17.7)
IMM GRANULOCYTES # BLD: 0 10E3/UL
IMM GRANULOCYTES NFR BLD: 1 %
LYMPHOCYTES # BLD AUTO: 1.4 10E3/UL (ref 0.8–5.3)
LYMPHOCYTES NFR BLD AUTO: 24 %
MCH RBC QN AUTO: 31.5 PG (ref 26.5–33)
MCHC RBC AUTO-ENTMCNC: 37 G/DL (ref 31.5–36.5)
MCV RBC AUTO: 85 FL (ref 78–100)
MONOCYTES # BLD AUTO: 0.7 10E3/UL (ref 0–1.3)
MONOCYTES NFR BLD AUTO: 12 %
NEUTROPHILS # BLD AUTO: 3.6 10E3/UL (ref 1.6–8.3)
NEUTROPHILS NFR BLD AUTO: 60 %
NRBC # BLD AUTO: 0 10E3/UL
NRBC BLD AUTO-RTO: 0 /100
PLATELET # BLD AUTO: 132 10E3/UL (ref 150–450)
POTASSIUM SERPL-SCNC: 3.3 MMOL/L (ref 3.4–5.3)
PROCALCITONIN SERPL IA-MCNC: 0.32 NG/ML
RBC # BLD AUTO: 3.84 10E6/UL (ref 4.4–5.9)
WBC # BLD AUTO: 6 10E3/UL (ref 4–11)

## 2024-05-28 PROCEDURE — 84132 ASSAY OF SERUM POTASSIUM: CPT | Performed by: INTERNAL MEDICINE

## 2024-05-28 PROCEDURE — 250N000012 HC RX MED GY IP 250 OP 636 PS 637: Performed by: HOSPITALIST

## 2024-05-28 PROCEDURE — 36415 COLL VENOUS BLD VENIPUNCTURE: CPT | Performed by: HOSPITALIST

## 2024-05-28 PROCEDURE — 84145 PROCALCITONIN (PCT): CPT | Performed by: HOSPITALIST

## 2024-05-28 PROCEDURE — 86140 C-REACTIVE PROTEIN: CPT | Performed by: HOSPITALIST

## 2024-05-28 PROCEDURE — 99239 HOSP IP/OBS DSCHRG MGMT >30: CPT | Performed by: INTERNAL MEDICINE

## 2024-05-28 PROCEDURE — 250N000013 HC RX MED GY IP 250 OP 250 PS 637: Performed by: HOSPITALIST

## 2024-05-28 PROCEDURE — 250N000013 HC RX MED GY IP 250 OP 250 PS 637: Performed by: INTERNAL MEDICINE

## 2024-05-28 PROCEDURE — 85025 COMPLETE CBC W/AUTO DIFF WBC: CPT | Performed by: HOSPITALIST

## 2024-05-28 RX ORDER — POTASSIUM CHLORIDE 1500 MG/1
40 TABLET, EXTENDED RELEASE ORAL ONCE
Status: COMPLETED | OUTPATIENT
Start: 2024-05-28 | End: 2024-05-28

## 2024-05-28 RX ADMIN — METOCLOPRAMIDE 10 MG: 5 TABLET ORAL at 07:50

## 2024-05-28 RX ADMIN — ESCITALOPRAM OXALATE 20 MG: 20 TABLET ORAL at 07:50

## 2024-05-28 RX ADMIN — ACETAMINOPHEN 650 MG: 325 TABLET, FILM COATED ORAL at 07:49

## 2024-05-28 RX ADMIN — PANTOPRAZOLE SODIUM 20 MG: 20 TABLET, DELAYED RELEASE ORAL at 07:50

## 2024-05-28 RX ADMIN — FLUTICASONE FUROATE AND VILANTEROL TRIFENATATE 1 PUFF: 200; 25 POWDER RESPIRATORY (INHALATION) at 07:53

## 2024-05-28 RX ADMIN — ATORVASTATIN CALCIUM 20 MG: 20 TABLET, FILM COATED ORAL at 07:51

## 2024-05-28 RX ADMIN — PREDNISONE 10 MG: 5 TABLET ORAL at 07:50

## 2024-05-28 RX ADMIN — POTASSIUM CHLORIDE 40 MEQ: 1500 TABLET, EXTENDED RELEASE ORAL at 09:07

## 2024-05-28 ASSESSMENT — ACTIVITIES OF DAILY LIVING (ADL)
ADLS_ACUITY_SCORE: 20

## 2024-05-28 NOTE — PLAN OF CARE
Pt A&O x 4 this shift. Pt complaints of headache this morning, PRN tylenol given. Pt remains on room air, lung sounds are clear. Pt blood glucose 124 this morning.   Pt gives self 5 units of insulin via self this morning from insulin pump after breakfast. Pt states insulin pump is giving pt a continuous rate of 2.6 units Humalog per hour.     Pt independent ambulating in room this shift.     Patient discharged at 9:45 AM via ambulation accompanied by significant other and staff. Prescriptions sent to patients preferred pharmacy. All belongings sent with patient.     Discharge instructions reviewed with patient. Listed belongings gathered and returned to patient.     Patient discharged to home.       Surgical Patient   Surgical Procedures during stay: No  Did patient receive discharge instruction on wound care and recognition of infection symptoms? Yes    MISC  Follow up appointment made:  No, Clinic will be calling pt to schedule follow up.  Home medications returned to patient: N/A  Patient reports pain was well managed at discharge: Yes

## 2024-05-28 NOTE — PROGRESS NOTES
"Report/update from from KIKI, Charge RN.   Pt in room with visitor watching tv. Planning for discharge today.     Introduced self. Inquired of questions or concerns.   Pt and visitor denies questions for diabetes educator at this time. Offered to assist with appointment with DRC DARIO Quiles or Ashwini as noted he had seen DARIO Quiles previously in clinic. Pt states he is set up with someone, \"[facility] Declines [facility] appointment.  Pt states he has everything needed for his pump, insulin needs.     Offer if any questions or concerns, will be available for questions.     Brigitte Espinal RN Diabetes Educator,  [phone]  5/28/2024 at 9:16 AM      "

## 2024-05-28 NOTE — PLAN OF CARE
Patient requested IV be removed due to discomfort and it catching on items.  Writer proposed to wrap and protect the site but patient refused.  Patient informed of potential need to administer antiemetic or other medications as required to ensure safety, but patient acknowledged risk and requested removal.  PIV removed.

## 2024-05-28 NOTE — DISCHARGE SUMMARY
[facility]    Discharge Summary  Hospitalist    Date of Admission:  5/24/2024  Date of Discharge:  5/28/2024  Discharging Provider: Pat Cueto MD, MD  Date of Service (when I saw the patient): 05/28/24    Discharge Diagnoses   Active Problems:    Tachycardia    Nausea and vomiting, unspecified vomiting type    DKA (diabetic ketoacidosis) (H)    Diabetic gastroparesis 2/2 DKA      History of Present Illness   Yaya Mg is an 49 year old male who presented with nausea, vomiting, headache.  Please see admission H+P for additional details.    Hospital Course   Yaya Mg was admitted on 5/24/2024.  He is a 49-year-old male with type I diabetic with Humulin insulin pump, has established diagnosis of morbid obesity, depression anxiety, umbilical hernia,.  Patient recently was diagnosed with pulmonary sarcoidosis 2023 and has been on steroid  taper.  He previously did take Bactrim.  He is also used THC Gummies in past.  Patient woke up on Friday at 4 am in the morning with nausea and vomiting and dry heaving and noticed that his insulin pump was low and insulin but his wife was unable to refill this for him.  Patient was not feeling well so he disconnected the insulin pump and he did do subcu insulin during the day on Friday.  He presented to the emergency room on Friday evening because he was not feeling well and, he was found to have anion gap of 22, bicarb of 20, pH venous 7.32, glucose 499, his lactic acid was 4.2, beta-2 hydroxybutyrate 3.7, abdominal pain.  CT abdomen was done with no acute findings, read from ct was Limited images through the lung bases demonstrate no focal consolidation or mass.  The bowel is normal in caliber.  Patient was given total of 5 L of normal saline bolus in the emergency room and was started on maintenance fluids.  Patient initially did not make any urine but later did, urine analysis was done showing protein and glucose present in the urine no  signs of urinary tract infection.  Patient did complain of pounding headache on admission and then improved with fluid administration and Tylenol.  Meningitis was suspected at that time, LP yielded negative spinal fluid for infection.  Empiric antibiotics were stopped.  There was discussion of transfer to [facility] for higher level of care, but with negative LP, this was canceled.  Patient did have a fever to 101, blood cultures as well as spinal fluid cultures negative.  Patient did vastly improve, insulin pump was restarted.  With elevated ketones, hyperglycemia, and symptom complex, patient likely had DKA and resultant symptoms that were consistent with metabolic derangement as a result of DKA.  His insulin pump was reinitiated.  He confesses that his pump did run out of insulin in the middle of the night, and that might have precipitated this whole event.  In any case, patient is now feeling well, ready for discharge home.  He will follow-up with his primary care physician within the week to assess for continued improvement.    Pat Cueto MD      Significant Results and Procedures   See below    Pending Results   These results will be followed up by Selwyn Ashraf    Unresulted Labs Ordered in the Past 30 Days of this Admission       Date and Time Order Name Status Description    5/26/2024  3:45 PM Serum Collection (Accompanies: [id number] or [id number] CSF Testing) In process     5/26/2024  3:45 PM Oligoclonal Banding: In process     5/26/2024  3:45 PM Angiotensin Converting Enzyme CSF: In process     5/26/2024  3:45 PM Myelin basic protein CSF: In process     5/26/2024  3:45 PM Oligoclonal banding CSF Tube 3 and Blood In process     5/26/2024  3:45 PM Beta 2 microglobulin CSF: In process     5/26/2024  3:45 PM Anaerobic CSF culture Preliminary     5/26/2024  3:45 PM Cerebrospinal fluid Aerobic Bacterial Culture Routine With Gram Stain Preliminary     5/25/2024  6:16 AM Babesia antibody IgG IgM In process      5/25/2024  6:14 AM Lyme disease DNA detection by PCR In process     5/25/2024  5:24 AM Blood Culture Hand, Right Preliminary     5/25/2024  5:24 AM Blood Culture Hand, Left Preliminary             Code Status   Full Code       Primary Care Physician   Selwyn Ashraf    Physical Exam   Temp: 99.8  F (37.7  C) Temp src: Tympanic BP: 145/82 Pulse: 69   Resp: 16 SpO2: 96 % O2 Device: None (Room air)    Vitals:    05/25/24 0529   Weight: 110.8 kg (244 lb 4.3 oz)     Vital Signs with Ranges  Temp:  [97.7  F (36.5  C)-99.8  F (37.7  C)] 99.8  F (37.7  C)  Pulse:  [69-77] 69  Resp:  [14-16] 16  BP: (142-157)/(75-84) 145/82  SpO2:  [93 %-96 %] 96 %  I/O last 3 completed shifts:  In: 120 [P.O.:120]  Out: -     Constitutional: AA, NAD  Eyes: PERRLA, no injection, no icterus  HEENT: atraumatic, normocephalic  Respiratory: CTA b/l  Cardiovascular: S1 S2 RRR  GI: soft, NT, ND, + bowel sounds  Lymph/Hematologic: no palpable lymphadenopathy  Skin: no rashes, no lesions  Musculoskeletal: No edema, good tone, no deformities  Neurologic: oriented x 3, no focal deficits  Psychiatric: appropriate affect      Discharge Disposition   Discharged to home  Condition at discharge: Stable    Consultations This Hospital Stay   PHARMACY TO DOSE VANCO  DIABETES EDUCATION IP CONSULT  ANESTHESIOLOGY IP CONSULT    Time Spent on this Encounter   IPat MD, personally saw the patient today and spent greater than 30 minutes discharging this patient.    Discharge Orders      Reason for your hospital stay    DKA, gastroparesis     Follow-up and recommended labs and tests     Follow up with primary care provider, Selwyn Ashraf, within 7 days for hospital follow- up.  The following labs/tests are recommended: bmp.     Activity    Your activity upon discharge: activity as tolerated     Diet    Follow this diet upon discharge: Orders Placed This Encounter      Consistent Carbohydrate Diet Moderate Consistent Carb (60 g CHO per Meal) Diet      Discharge Medications   Current Discharge Medication List        CONTINUE these medications which have NOT CHANGED    Details   azithromycin (ZITHROMAX) 250 MG tablet As directed  Qty: 6 tablet, Refills: 0    Associated Diagnoses: Bronchopneumonia      atorvastatin (LIPITOR) 20 MG tablet TAKE 1 TABLET DAILY  Qty: 90 tablet, Refills: 0    Associated Diagnoses: Mixed hyperlipidemia      Continuous Blood Gluc  (DEXCOM G6 ) JUSTEN       Continuous Blood Gluc Sensor (DEXCOM G6 SENSOR) MISC USE ONE SENSOR EVERY 10 DAYS, CHANGE EVERY 10 DAYS  Qty: 9 each, Refills: 4    Associated Diagnoses: Type 1 diabetes mellitus without complication (H)      Continuous Blood Gluc Transmit (DEXCOM G6 TRANSMITTER) MISC 1 each every 3 months Change every 3 months.  Qty: 1 each, Refills: 4    Associated Diagnoses: Type 1 diabetes mellitus without complication (H)      escitalopram (LEXAPRO) 20 MG tablet TAKE 1 TABLET DAILY  Qty: 90 tablet, Refills: 3    Associated Diagnoses: Depression with anxiety      gabapentin (NEURONTIN) 300 MG capsule 1-2 capsules at night as needed  Qty: 180 capsule, Refills: 3    Associated Diagnoses: Burning sensation of feet      insulin aspart (NOVOLOG VIAL) 100 UNITS/ML vial Uses 150 units daily in his pump  Qty: 50 mL, Refills: 4    Associated Diagnoses: Type 1 diabetes mellitus without complication (H)      Insulin Infusion Pump (T: SLIM X2 INS /CONTROL 7.4) JUSTEN       Insulin Infusion Pump Supplies (AUTOSOFT 90 INFUSION SET) MISC Inject 1 each Subcutaneous every 48 hours  Qty: 45 each, Refills: 4    Associated Diagnoses: Type 1 diabetes mellitus without complication (H)      Insulin Infusion Pump Supplies (T:SLIM X2 3ML CARTRIDGE) MISC Inject 1 each Subcutaneous every 48 hours  Qty: 45 each, Refills: 3    Associated Diagnoses: Type 1 diabetes mellitus without complication (H)      insulin lispro (HUMALOG) 100 UNIT/ML vial To be used with insulin pump total daily dose 150 units  Qty: 140  mL, Refills: 4    Associated Diagnoses: Type 1 diabetes mellitus without complication (H)      lisinopril (ZESTRIL) 5 MG tablet TAKE 1 TABLET BY MOUTH EVERY DAY  Qty: 90 tablet, Refills: 3    Associated Diagnoses: Type 1 diabetes mellitus without complication (H)      !! predniSONE (DELTASONE) 10 MG tablet       !! predniSONE (DELTASONE) 20 MG tablet Take 20 mg by mouth 2 times daily      sulfamethoxazole-trimethoprim (BACTRIM DS) 800-160 MG tablet Take 1 tablet by mouth three times a week       !! - Potential duplicate medications found. Please discuss with provider.        Allergies   No Known Allergies  Data   Most Recent 3 CBC's:  Recent Labs   Lab Test 05/28/24  0538 05/27/24  0535 05/26/24  0201   WBC 6.0 8.6 8.0   HGB 12.1* 12.7* 12.3*   MCV 85 85 86   * 160 147*      Most Recent 3 BMP's:  Recent Labs   Lab Test 05/28/24  0538 05/27/24  1649 05/27/24  0535 05/26/24  0201 05/25/24  2127   NA  --   --  141 137 133*   POTASSIUM 3.3* 3.7 3.3* 3.5 4.2   CHLORIDE  --   --  106 105 103   CO2  --   --  28 28 21*   BUN  --   --  6.3 13.5 16.0   CR  --   --  0.72 0.76 0.75   ANIONGAP  --   --  7 4* 9   SONIA  --   --  8.4* 8.5* 8.4*   GLC  --   --  113* 175* 295*     Most Recent 2 LFT's:  Recent Labs   Lab Test 05/27/24  0535 05/25/24  0145   AST 20 18   ALT 25 30   ALKPHOS 69 89   BILITOTAL 0.5 1.5*     Most Recent INR's and Anticoagulation Dosing History:  Anticoagulation Dose History           No data to display              Most Recent 3 Troponin's:No lab results found.  Most Recent Cholesterol Panel:  Recent Labs   Lab Test 02/15/24  0808   CHOL 183   LDL 75   HDL 76   TRIG 161*     Most Recent 6 Bacteria Isolates From Any Culture (See EPIC Reports for Culture Details):No lab results found.  Most Recent TSH, T4 and A1c Labs:  Recent Labs   Lab Test 05/25/24  1507 05/25/24  0643   TSH  --  0.64   A1C 7.1*  --      Results for orders placed or performed during the hospital encounter of 05/24/24   CT Abdomen  Pelvis w Contrast    Narrative    PROCEDURE:  CT ABDOMEN PELVIS W CONTRAST    HISTORY: generalized abd pain, vomiting    TECHNIQUE: Helical CT of the abdomen and pelvis was performed  following injection of intravenous contrast. This CT exam was  performed using one or more the following dose reduction techniques:  automated exposure control, adjustment of the mA and/or kV according  to patient size, and/or iterative reconstruction technique.    COMPARISON: None.    MEDS/CONTRAST: Isovue 370 100mL    FINDINGS:      Limited images through the lung bases demonstrate no focal  consolidation or mass.     The liver demonstrates no mass or intrahepatic biliary ductal  dilatation. Hepatic steatosis is questioned. The gallbladder is  unremarkable. The spleen is enlarged measuring 13.6 cm in length. The  pancreas and adrenal glands are unremarkable. Symmetric nephrograms  are present without hydronephrosis. There is no abdominal aortic  aneurysm.    The bowel is normal in caliber. A penile prosthesis is noted with a  reservoir anterior to the right aspect of the bladder. No free fluid,  free air or adenopathy is seen.     No suspicious osseous lesions are identified.      Impression    IMPRESSION:      Hepatic steatosis.    LEONARDA FRANKLIN MD         SYSTEM ID:  [id number]   XR Chest 1 View    Narrative    PROCEDURE:  XR CHEST 1 VIEW    HISTORY: hypoxia,fever. .    COMPARISON:  10/3/2023    FINDINGS:    The cardiomediastinal contours are stable.  No focal consolidation, effusion or pneumothorax.      Impression    IMPRESSION:  Stable chest.      LEONARDA FRANKLIN MD         SYSTEM ID:  [id number]   CT Head w/o Contrast    Narrative    PROCEDURE: CT HEAD W/O CONTRAST     HISTORY: headache, tachy, fever, here with early DKA, nasuea.;  Headache; Acute HA (< 3 months), no complicating features.    COMPARISON: None.    TECHNIQUE:  Helical images of the head from the foramen magnum to the  vertex were obtained without  contrast. This CT exam was performed  using one or more the following dose reduction techniques: automated  exposure control, adjustment of the mA and/or kV according to patient  size, and/or iterative reconstruction technique.    FINDINGS: The ventricles and sulci are normal in volume. No acute  intracranial hemorrhage, mass effect, midline shift, hydrocephalus or  basilar cystern effacement are present.    The grey-white matter interface is preserved.    The calvarium is intact. The mastoid air cells are clear.  Scattered  ethmoid mucosal thickening is seen.      Impression    IMPRESSION: No acute intracranial hemorrhage or CT evidence of acute  transcortical ischemia.      LEONARDA FRANKLIN MD         SYSTEM ID:  [id number]

## 2024-05-28 NOTE — PROGRESS NOTES
Consult for Diabetes education received.   Admitted: 5/24/2024 with nausea, vomiting, uncontrolled diabetes.     Humalog insulin pump.     Reviewed record. Pt last seen [facility] NP in 2021. PC Dr Ashraf.      Patient has a history of diabetes. Most recent A1C:  Lab Results   Component Value Date    A1C 7.1 05/25/2024      Plan- will met with patient in room.   Tentative plan for discharge today.     Brigitte Espinal RN Diabetes Educator,  [phone]  5/28/2024 at 8:51 AM

## 2024-05-28 NOTE — TELEPHONE ENCOUNTER
9:07 AM    Reason for Call: OVERBOOK/HOSPITAL FOLLOW UP    HOSPITAL FOLLOW UP/[facility]/discharge 05-/Lumbar Puncture, Infection, and Early DKA     The patient is requesting an appointment for within 7 days from discharge with Dr. Ashraf.    Was an appointment offered for this call? No  If yes : Appointment type              Date    Preferred method for responding to this message: Telephone Call  What is your phone number ?[phone]     If we cannot reach you directly, may we leave a detailed response at the number you provided? Yes    Can this message wait until your PCP/provider returns, if unavailable today? No    Amy Mcdaniel

## 2024-05-28 NOTE — PLAN OF CARE
Alert and oriented.  VSS.  Tmax 99.4 this shift.  Denies headaches, nausea, or chills today.  Call light in reach.  Very pleasant and thankful for our care, but looking forward to going home today.

## 2024-05-29 LAB
B BURGDOR DNA SPEC QL NAA+PROBE: NOT DETECTED
B MICROTI IGG TITR SER: NORMAL {TITER}
B MICROTI IGM TITR SER: NORMAL {TITER}
B2 MICROGLOB CSF-MCNC: 1.4 MG/L
MBP CSF-MCNC: 3.52 NG/ML

## 2024-05-30 LAB
ACE CSF-CCNC: 1.9 U/L
ALB CSF/SERPL: 9.9 RATIO
ALBUMIN CSF-MCNC: 31 MG/DL
ALBUMIN SERPL-MCNC: 3125 MG/DL
BACTERIA BLD CULT: NO GROWTH
BACTERIA BLD CULT: NO GROWTH
IGG CSF-MCNC: 3.5 MG/DL
IGG SERPL-MCNC: 408 MG/DL
IGG SYNTH RATE SER+CSF CALC-MRATE: 7.1 MG/D
IGG/ALB CLEAR SER+CSF-RTO: 0.86 RATIO
IGG/ALB CSF: 0.11 RATIO
OLIGOCLONAL BANDS CSF ELPH-IMP: ABNORMAL
OLIGOCLONAL BANDS CSF ELPH-IMP: NEGATIVE
OLIGOCLONAL BANDS CSF IEF: 0 BANDS

## 2024-05-31 ENCOUNTER — PATIENT OUTREACH (OUTPATIENT)
Dept: CARE COORDINATION | Facility: OTHER | Age: 49
End: 2024-05-31

## 2024-06-03 ENCOUNTER — OFFICE VISIT (OUTPATIENT)
Dept: FAMILY MEDICINE | Facility: OTHER | Age: 49
End: 2024-06-03
Attending: FAMILY MEDICINE
Payer: COMMERCIAL

## 2024-06-03 VITALS
TEMPERATURE: 96.9 F | DIASTOLIC BLOOD PRESSURE: 76 MMHG | RESPIRATION RATE: 16 BRPM | OXYGEN SATURATION: 97 % | BODY MASS INDEX: 37.18 KG/M2 | WEIGHT: 244.5 LBS | HEART RATE: 74 BPM | SYSTOLIC BLOOD PRESSURE: 114 MMHG

## 2024-06-03 DIAGNOSIS — R42 VERTIGO: ICD-10-CM

## 2024-06-03 DIAGNOSIS — E10.65 TYPE 1 DIABETES MELLITUS WITH HYPERGLYCEMIA (H): ICD-10-CM

## 2024-06-03 DIAGNOSIS — Z86.39 HISTORY OF DIABETIC KETOACIDOSIS: ICD-10-CM

## 2024-06-03 DIAGNOSIS — Z09 HOSPITAL DISCHARGE FOLLOW-UP: Primary | ICD-10-CM

## 2024-06-03 LAB
ANION GAP SERPL CALCULATED.3IONS-SCNC: 10 MMOL/L (ref 7–15)
BACTERIA CSF CULT: NO GROWTH
BACTERIA CSF CULT: NORMAL
BASOPHILS # BLD AUTO: 0.1 10E3/UL (ref 0–0.2)
BASOPHILS NFR BLD AUTO: 1 %
BUN SERPL-MCNC: 14.5 MG/DL (ref 6–20)
CALCIUM SERPL-MCNC: 9.1 MG/DL (ref 8.6–10)
CHLORIDE SERPL-SCNC: 97 MMOL/L (ref 98–107)
CREAT SERPL-MCNC: 0.71 MG/DL (ref 0.67–1.17)
CRP SERPL-MCNC: <3 MG/L
DEPRECATED HCO3 PLAS-SCNC: 24 MMOL/L (ref 22–29)
EGFRCR SERPLBLD CKD-EPI 2021: >90 ML/MIN/1.73M2
EOSINOPHIL # BLD AUTO: 0.1 10E3/UL (ref 0–0.7)
EOSINOPHIL NFR BLD AUTO: 2 %
ERYTHROCYTE [DISTWIDTH] IN BLOOD BY AUTOMATED COUNT: 13.4 % (ref 10–15)
GLUCOSE SERPL-MCNC: 218 MG/DL (ref 70–99)
HCT VFR BLD AUTO: 39.8 % (ref 40–53)
HGB BLD-MCNC: 14.4 G/DL (ref 13.3–17.7)
IMM GRANULOCYTES # BLD: 0.1 10E3/UL
IMM GRANULOCYTES NFR BLD: 1 %
LYMPHOCYTES # BLD AUTO: 1.1 10E3/UL (ref 0.8–5.3)
LYMPHOCYTES NFR BLD AUTO: 19 %
MAGNESIUM SERPL-MCNC: 1.8 MG/DL (ref 1.7–2.3)
MCH RBC QN AUTO: 31 PG (ref 26.5–33)
MCHC RBC AUTO-ENTMCNC: 36.2 G/DL (ref 31.5–36.5)
MCV RBC AUTO: 86 FL (ref 78–100)
MONOCYTES # BLD AUTO: 0.8 10E3/UL (ref 0–1.3)
MONOCYTES NFR BLD AUTO: 13 %
NEUTROPHILS # BLD AUTO: 4.1 10E3/UL (ref 1.6–8.3)
NEUTROPHILS NFR BLD AUTO: 66 %
NRBC # BLD AUTO: 0 10E3/UL
NRBC BLD AUTO-RTO: 0 /100
PLATELET # BLD AUTO: 254 10E3/UL (ref 150–450)
POTASSIUM SERPL-SCNC: 4.5 MMOL/L (ref 3.4–5.3)
RBC # BLD AUTO: 4.65 10E6/UL (ref 4.4–5.9)
SODIUM SERPL-SCNC: 131 MMOL/L (ref 135–145)
WBC # BLD AUTO: 6.2 10E3/UL (ref 4–11)

## 2024-06-03 PROCEDURE — 80048 BASIC METABOLIC PNL TOTAL CA: CPT | Performed by: FAMILY MEDICINE

## 2024-06-03 PROCEDURE — 36415 COLL VENOUS BLD VENIPUNCTURE: CPT | Performed by: FAMILY MEDICINE

## 2024-06-03 PROCEDURE — 85025 COMPLETE CBC W/AUTO DIFF WBC: CPT | Performed by: FAMILY MEDICINE

## 2024-06-03 PROCEDURE — 99495 TRANSJ CARE MGMT MOD F2F 14D: CPT | Performed by: FAMILY MEDICINE

## 2024-06-03 PROCEDURE — 86140 C-REACTIVE PROTEIN: CPT | Performed by: FAMILY MEDICINE

## 2024-06-03 PROCEDURE — 83735 ASSAY OF MAGNESIUM: CPT | Performed by: FAMILY MEDICINE

## 2024-06-03 RX ORDER — ALBUTEROL SULFATE 90 UG/1
AEROSOL, METERED RESPIRATORY (INHALATION)
COMMUNITY
Start: 2024-05-13

## 2024-06-03 RX ORDER — MOMETASONE FUROATE AND FORMOTEROL FUMARATE DIHYDRATE 200; 5 UG/1; UG/1
2 AEROSOL RESPIRATORY (INHALATION) EVERY 12 HOURS
COMMUNITY
Start: 2024-05-09

## 2024-06-03 ASSESSMENT — ASTHMA QUESTIONNAIRES
QUESTION_1 LAST FOUR WEEKS HOW MUCH OF THE TIME DID YOUR ASTHMA KEEP YOU FROM GETTING AS MUCH DONE AT WORK, SCHOOL OR AT HOME: NONE OF THE TIME
QUESTION_5 LAST FOUR WEEKS HOW WOULD YOU RATE YOUR ASTHMA CONTROL: WELL CONTROLLED
QUESTION_4 LAST FOUR WEEKS HOW OFTEN HAVE YOU USED YOUR RESCUE INHALER OR NEBULIZER MEDICATION (SUCH AS ALBUTEROL): NOT AT ALL
QUESTION_2 LAST FOUR WEEKS HOW OFTEN HAVE YOU HAD SHORTNESS OF BREATH: ONCE OR TWICE A WEEK
QUESTION_3 LAST FOUR WEEKS HOW OFTEN DID YOUR ASTHMA SYMPTOMS (WHEEZING, COUGHING, SHORTNESS OF BREATH, CHEST TIGHTNESS OR PAIN) WAKE YOU UP AT NIGHT OR EARLIER THAN USUAL IN THE MORNING: ONCE OR TWICE
ACT_TOTALSCORE: 22
ACT_TOTALSCORE: 22

## 2024-06-03 ASSESSMENT — PAIN SCALES - GENERAL: PAINLEVEL: MODERATE PAIN (4)

## 2024-06-03 NOTE — PROGRESS NOTES
Assessment & Plan     Hospital discharge follow-up  - Diabetes Education Referral ([facility])  - Basic metabolic panel  - CBC with Platelets & Differential  - Magnesium  - CRP inflammation    Type 1 diabetes mellitus with hyperglycemia (H)  - Diabetes Education Referral ([facility])  - Basic metabolic panel  - CBC with Platelets & Differential  - Magnesium  - CRP inflammation    Vertigo    History of DKA      MED REC REQUIRED  Post Medication Reconciliation Status:  Discharge medications reconciled, continue medications without change        Making progress, doing much better, still has some residual vertigo and because he is a , he doesn't feel safe to return to work yet.  Will keep off for another week and re-eval at that time.  He is back on insulin pump, sugars okay, no other concerns at this time.  Will follow his labs to ensure back to normal.      Subjective   Gabriel is a 49 year old, presenting for the following health issues:  Hospital F/U        6/3/2024     3:23 PM   Additional Questions   Accompanied by self         6/3/2024     3:23 PM   Patient Reported Additional Medications   Patient reports taking the following new medications none     HPI       In short, he has type 1 diabetes, his insulin pump ran out of insulin, and he went into DKA.  Extensive hospital work-up, ultimately felt everything was from the DKA.  Currently feeling much better, still having some vertigo.  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  Otherwise, no other concerns today.      Copy from Discharge summary:     Expand All Collapse All    [facility]     Discharge Summary  Hospitalist     Date of Admission:  5/24/2024  Date of Discharge:  5/28/2024  Discharging Provider: Pat Cueto MD, MD  Date of Service (when I saw the patient): 05/28/24        Discharge Diagnoses  Active Problems:    Tachycardia    Nausea and vomiting, unspecified vomiting type    DKA  (diabetic ketoacidosis) (H)    Diabetic gastroparesis 2/2 DKA              History of Present Illness  Yaya Mg is an 49 year old male who presented with nausea, vomiting, headache.  Please see admission H+P for additional details.           Hospital Course  Yaya Mg was admitted on 5/24/2024.  He is a 49-year-old male with type I diabetic with Humulin insulin pump, has established diagnosis of morbid obesity, depression anxiety, umbilical hernia,.  Patient recently was diagnosed with pulmonary sarcoidosis 2023 and has been on steroid  taper.  He previously did take Bactrim.  He is also used THC Gummies in past.  Patient woke up on Friday at 4 am in the morning with nausea and vomiting and dry heaving and noticed that his insulin pump was low and insulin but his wife was unable to refill this for him.  Patient was not feeling well so he disconnected the insulin pump and he did do subcu insulin during the day on Friday.  He presented to the emergency room on Friday evening because he was not feeling well and, he was found to have anion gap of 22, bicarb of 20, pH venous 7.32, glucose 499, his lactic acid was 4.2, beta-2 hydroxybutyrate 3.7, abdominal pain.  CT abdomen was done with no acute findings, read from ct was Limited images through the lung bases demonstrate no focal consolidation or mass.  The bowel is normal in caliber.  Patient was given total of 5 L of normal saline bolus in the emergency room and was started on maintenance fluids.  Patient initially did not make any urine but later did, urine analysis was done showing protein and glucose present in the urine no signs of urinary tract infection.  Patient did complain of pounding headache on admission and then improved with fluid administration and Tylenol.  Meningitis was suspected at that time, LP yielded negative spinal fluid for infection.  Empiric antibiotics were stopped.  There was discussion of transfer to [facility] for  higher level of care, but with negative LP, this was canceled.  Patient did have a fever to 101, blood cultures as well as spinal fluid cultures negative.  Patient did vastly improve, insulin pump was restarted.  With elevated ketones, hyperglycemia, and symptom complex, patient likely had DKA and resultant symptoms that were consistent with metabolic derangement as a result of DKA.  His insulin pump was reinitiated.  He confesses that his pump did run out of insulin in the middle of the night, and that might have precipitated this whole event.  In any case, patient is now feeling well, ready for discharge home.  He will follow-up with his primary care physician within the week to assess for continued improvement.          Hospital Follow-up Visit:    Hospital/Nursing Home/IP Rehab Facility: [facility]  Date of Admission: 05/24/2024  Date of Discharge: 0528/2024  Reason(s) for Admission: DKA  Was the patient in the ICU or did the patient experience delirium during hospitalization?  No  Do you have any other stressors you would like to discuss with your provider? No    Problems taking medications regularly:  None  Medication changes since discharge: None  Problems adhering to non-medication therapy:  None    Summary of hospitalization:  [facility] discharge summary reviewed  Diagnostic Tests/Treatments reviewed.  Follow up needed: none  Other Healthcare Providers Involved in Patient s Care:         None  Update since discharge: improved.         Plan of care communicated with patient           Objective    /76 (BP Location: Left arm, Patient Position: Sitting, Cuff Size: Adult Regular)   Pulse 74   Temp 96.9  F (36.1  C) (Tympanic)   Resp 16   Wt 110.9 kg (244 lb 8 oz)   SpO2 97%   BMI 37.18 kg/m    Body mass index is 37.18 kg/m .  Physical Exam  Constitutional:       General: He is not in acute distress.     Appearance: Normal appearance.   Eyes:      Extraocular Movements:  Extraocular movements intact.      Conjunctiva/sclera: Conjunctivae normal.      Pupils: Pupils are equal, round, and reactive to light.   Cardiovascular:      Rate and Rhythm: Normal rate and regular rhythm.      Heart sounds: Normal heart sounds. No murmur heard.  Pulmonary:      Effort: Pulmonary effort is normal.      Breath sounds: Normal breath sounds.   Abdominal:      General: Bowel sounds are normal.      Palpations: Abdomen is soft.      Tenderness: There is no abdominal tenderness.   Neurological:      General: No focal deficit present.      Mental Status: He is alert and oriented to person, place, and time.      Cranial Nerves: No cranial nerve deficit.      Motor: No weakness.                    Signed Electronically by: SUSANNA SHEA DO

## 2024-06-04 DIAGNOSIS — E10.9 TYPE 1 DIABETES MELLITUS WITHOUT COMPLICATION (H): ICD-10-CM

## 2024-06-05 RX ORDER — INSULIN PUMP CARTRIDGE
CARTRIDGE (EA) SUBCUTANEOUS
Qty: 40 EACH | Refills: 3 | Status: SHIPPED | OUTPATIENT
Start: 2024-06-05

## 2024-06-05 NOTE — TELEPHONE ENCOUNTER
Insulin Infusion Pump (T:Slim x2 3mL Cartridge) miscellaneous  Last Written Prescription Date:  06/12/2023  Last Fill Quantity: 40,   # refills: 3  Last Office Visit: 06/03/2024

## 2024-06-10 ENCOUNTER — OFFICE VISIT (OUTPATIENT)
Dept: FAMILY MEDICINE | Facility: OTHER | Age: 49
End: 2024-06-10
Attending: FAMILY MEDICINE
Payer: COMMERCIAL

## 2024-06-10 VITALS
OXYGEN SATURATION: 97 % | DIASTOLIC BLOOD PRESSURE: 91 MMHG | BODY MASS INDEX: 37.05 KG/M2 | WEIGHT: 244.49 LBS | HEART RATE: 78 BPM | RESPIRATION RATE: 16 BRPM | TEMPERATURE: 96.4 F | HEIGHT: 68 IN | SYSTOLIC BLOOD PRESSURE: 104 MMHG

## 2024-06-10 DIAGNOSIS — Z86.39 HISTORY OF DIABETIC KETOACIDOSIS: ICD-10-CM

## 2024-06-10 DIAGNOSIS — E10.65 TYPE 1 DIABETES MELLITUS WITH HYPERGLYCEMIA (H): Primary | ICD-10-CM

## 2024-06-10 PROCEDURE — 99213 OFFICE O/P EST LOW 20 MIN: CPT | Performed by: FAMILY MEDICINE

## 2024-06-10 NOTE — PROGRESS NOTES
"  Assessment & Plan     Type 1 diabetes mellitus with hyperglycemia (H)  History of diabetic ketoacidosis      Paperwork filled out to return to normal job duties tomorrow.        Subjective   Gabriel is a 49 year old, presenting for the following health issues:  paperwork        6/10/2024     2:21 PM   Additional Questions   Roomed by torey pierre   Accompanied by none         6/10/2024   Forms   Any forms needing to be completed Yes         6/10/2024     2:21 PM   Patient Reported Additional Medications   Patient reports taking the following new medications none     HPI     49-year-old male here today for a release to normal work duties.  He works as a  in the mines.  He was previously seen a week ago for a discharge follow-up after going into DKA after his insulin pump ran out of insulin.  At that time, he was still having some mild vertigo symptoms so we kept him off work to let him fully recover.    Currently feeling 100% back to normal, no residual symptoms, denies polydipsia, polyuria, vertigo.  He states his sugars are running in the 120s.        Objective    BP (!) 104/91 (BP Location: Right arm, Patient Position: Sitting, Cuff Size: Adult Large)   Pulse 78   Temp (!) 96.4  F (35.8  C) (Tympanic)   Resp 16   Ht 1.727 m (5' 8\")   Wt 110.9 kg (244 lb 7.8 oz)   SpO2 97%   BMI 37.17 kg/m    Body mass index is 37.17 kg/m .  Physical Exam  Constitutional:       General: He is not in acute distress.     Appearance: Normal appearance.   Cardiovascular:      Rate and Rhythm: Normal rate and regular rhythm.      Heart sounds: Normal heart sounds. No murmur heard.  Pulmonary:      Effort: Pulmonary effort is normal.      Breath sounds: Normal breath sounds.   Neurological:      General: No focal deficit present.      Mental Status: He is alert and oriented to person, place, and time.                    Signed Electronically by: SUSANNA SHEA DO    "

## 2024-06-11 ENCOUNTER — PATIENT OUTREACH (OUTPATIENT)
Dept: GASTROENTEROLOGY | Facility: CLINIC | Age: 49
End: 2024-06-11
Payer: COMMERCIAL

## 2024-06-11 NOTE — PROGRESS NOTES
Per MD below. Will not place order for colonoscopy.       Selwyn Ashraf MD Stechmann, Kathleen, RN  Please hold          Previous Messages       ----- Message -----  From: Eufemia Santos, RN  Sent: 6/10/2024  11:03 AM CDT  To: Selwyn Ashraf MD  Subject: Colonoscopy                                      Hi Dr. Ashraf,  I am part of the colorectal cancer screening team. I am reaching out as this patient is due for a colonoscopy. Review of her chart indicates you suggested she wait until she is off steroids. Would you be ok with me placing an order now or would you like me to continue to hold off?  Thanks in advance,  Cyn Santos, RN BSN  RN Colorectal Cancer   Division of Gastroenterology  [facility]

## 2024-07-02 DIAGNOSIS — R20.8 BURNING SENSATION OF FEET: ICD-10-CM

## 2024-07-03 RX ORDER — GABAPENTIN 300 MG/1
CAPSULE ORAL
Qty: 180 CAPSULE | Refills: 0 | Status: SHIPPED | OUTPATIENT
Start: 2024-07-03 | End: 2024-09-23

## 2024-07-03 NOTE — TELEPHONE ENCOUNTER
gabapentin (NEURONTIN) 300 MG capsule    Last Written Prescription Date:  6-25-23  Last Fill Quantity: 180,   # refills: 3  Last Office Visit: 6-10-24  Future Office visit:       Routing refill request to provider for review/approval because:  Drug not on the [facility], [facility] or [facility] refill protocol or controlled substance

## 2024-07-18 ENCOUNTER — TRANSFERRED RECORDS (OUTPATIENT)
Dept: HEALTH INFORMATION MANAGEMENT | Facility: CLINIC | Age: 49
End: 2024-07-18
Payer: COMMERCIAL

## 2024-07-19 ENCOUNTER — TELEPHONE (OUTPATIENT)
Dept: EDUCATION SERVICES | Facility: OTHER | Age: 49
End: 2024-07-19

## 2024-07-22 ENCOUNTER — TRANSFERRED RECORDS (OUTPATIENT)
Dept: HEALTH INFORMATION MANAGEMENT | Facility: HOSPITAL | Age: 49
End: 2024-07-22

## 2024-08-05 DIAGNOSIS — E78.2 MIXED HYPERLIPIDEMIA: ICD-10-CM

## 2024-08-05 DIAGNOSIS — E10.9 TYPE 1 DIABETES MELLITUS WITHOUT COMPLICATION (H): ICD-10-CM

## 2024-08-06 NOTE — TELEPHONE ENCOUNTER
Lipitor      Last Written Prescription Date:  01/02/24  Last Fill Quantity: 90,   # refills: 0  Last Office Visit: 06/10/24  Future Office visit:

## 2024-08-06 NOTE — TELEPHONE ENCOUNTER
Insulin pump supplies    Last Written Prescription Date:  06/12/23  Last Fill Quantity: 45,   # refills: 4  Last Office Visit: 06/03/24  Future Office visit:

## 2024-08-07 RX ORDER — INFUSION SET FOR INSULIN PUMP
INFUSION SETS-PARAPHERNALIA MISCELLANEOUS
Qty: 40 EACH | Refills: 3 | Status: SHIPPED | OUTPATIENT
Start: 2024-08-07

## 2024-08-07 RX ORDER — ATORVASTATIN CALCIUM 20 MG/1
20 TABLET, FILM COATED ORAL DAILY
Qty: 90 TABLET | Refills: 1 | Status: SHIPPED | OUTPATIENT
Start: 2024-08-07

## 2024-09-10 DIAGNOSIS — E10.9 TYPE 1 DIABETES MELLITUS WITHOUT COMPLICATION (H): ICD-10-CM

## 2024-09-10 NOTE — TELEPHONE ENCOUNTER
Dexcom transmitter      Last Written Prescription Date:  09/15/23  Last Fill Quantity: 1,   # refills: 4  Last Office Visit: 06/10/24  Future Office visit:

## 2024-09-12 RX ORDER — PROCHLORPERAZINE 25 MG/1
SUPPOSITORY RECTAL
Qty: 1 EACH | Refills: 3 | Status: SHIPPED | OUTPATIENT
Start: 2024-09-12

## 2024-09-16 ENCOUNTER — TRANSFERRED RECORDS (OUTPATIENT)
Dept: HEALTH INFORMATION MANAGEMENT | Facility: CLINIC | Age: 49
End: 2024-09-16

## 2024-09-16 LAB — RETINOPATHY: POSITIVE

## 2024-09-17 ENCOUNTER — TRANSFERRED RECORDS (OUTPATIENT)
Dept: MULTI SPECIALTY CLINIC | Facility: CLINIC | Age: 49
End: 2024-09-17
Payer: COMMERCIAL

## 2024-09-17 LAB — RETINOPATHY: NORMAL

## 2024-09-20 DIAGNOSIS — R20.8 BURNING SENSATION OF FEET: ICD-10-CM

## 2024-09-20 NOTE — TELEPHONE ENCOUNTER
Gabapentin 300 mg       Last Written Prescription Date:  7/3/24  Last Fill Quantity: 180,   # refills: 0  Last Office Visit: 6/10/24  Future Office visit:    Next 5 appointments (look out 90 days)      Oct 15, 2024 10:45 AM  (Arrive by 10:30 AM)  Provider Visit with Selwyn Ashraf MD  [facility] ([facility] ) [address]  [place] [address]  [phone]             Routing refill request to provider for review/approval because:  Drug not on the [facility], [facility] or [facility] refill protocol or controlled substance

## 2024-09-23 RX ORDER — GABAPENTIN 300 MG/1
CAPSULE ORAL
Qty: 180 CAPSULE | Refills: 3 | Status: SHIPPED | OUTPATIENT
Start: 2024-09-23

## 2024-10-11 ENCOUNTER — PATIENT OUTREACH (OUTPATIENT)
Dept: GASTROENTEROLOGY | Facility: CLINIC | Age: 49
End: 2024-10-11
Payer: COMMERCIAL

## 2024-10-11 DIAGNOSIS — Z12.11 SPECIAL SCREENING FOR MALIGNANT NEOPLASMS, COLON: Primary | ICD-10-CM

## 2024-10-11 NOTE — PROGRESS NOTES
"Hx adenomatous polyps with 1yr recall recommended on last colonoscopy performed in 2023    CRC Screening Colonoscopy Referral Review    Patient meets the inclusion criteria for screening colonoscopy standing order.    Ordering/Referring Provider:  Selwyn Ashraf      BMI: Estimated body mass index is 37.17 kg/m  as calculated from the following:    Height as of 6/10/24: 1.727 m (5' 8\").    Weight as of 6/10/24: 110.9 kg (244 lb 7.8 oz).     Sedation:  Does patient have any of the following conditions affecting sedation?  No medical conditions affecting sedation.    Previous Scopes:  Any previous recommendations or follow up needs based on previous scope?  na / No recommendations.    Medical Concerns to Postpone Order:  Does patient have any of the following medical concerns that should postpone/delay colonoscopy referral?  No medical conditions affecting colonoscopy referral.    Final Referral Details:  Based on patient's medical history patient is appropriate for referral order with moderate sedation. If patient's BMI > 50 do not schedule in ASC.  "

## 2024-10-14 ENCOUNTER — TELEPHONE (OUTPATIENT)
Dept: FAMILY MEDICINE | Facility: OTHER | Age: 49
End: 2024-10-14

## 2024-10-14 DIAGNOSIS — Z12.11 COLON CANCER SCREENING: Primary | ICD-10-CM

## 2024-10-15 ENCOUNTER — OFFICE VISIT (OUTPATIENT)
Dept: FAMILY MEDICINE | Facility: OTHER | Age: 49
End: 2024-10-15
Attending: FAMILY MEDICINE
Payer: COMMERCIAL

## 2024-10-15 ENCOUNTER — LAB (OUTPATIENT)
Dept: LAB | Facility: OTHER | Age: 49
End: 2024-10-15
Attending: FAMILY MEDICINE
Payer: COMMERCIAL

## 2024-10-15 VITALS
BODY MASS INDEX: 39.33 KG/M2 | WEIGHT: 259.5 LBS | OXYGEN SATURATION: 98 % | DIASTOLIC BLOOD PRESSURE: 88 MMHG | HEART RATE: 74 BPM | HEIGHT: 68 IN | SYSTOLIC BLOOD PRESSURE: 126 MMHG | TEMPERATURE: 97.3 F

## 2024-10-15 DIAGNOSIS — R09.A2 GLOBUS SENSATION: ICD-10-CM

## 2024-10-15 DIAGNOSIS — E10.65 TYPE 1 DIABETES MELLITUS WITH HYPERGLYCEMIA (H): Primary | ICD-10-CM

## 2024-10-15 DIAGNOSIS — D86.0 PULMONARY SARCOIDOSIS (H): ICD-10-CM

## 2024-10-15 DIAGNOSIS — J45.991 COUGH VARIANT ASTHMA: ICD-10-CM

## 2024-10-15 DIAGNOSIS — Z86.0100 HISTORY OF COLONIC POLYPS: ICD-10-CM

## 2024-10-15 DIAGNOSIS — F41.8 DEPRESSION WITH ANXIETY: ICD-10-CM

## 2024-10-15 DIAGNOSIS — Z23 NEED FOR PROPHYLACTIC VACCINATION AND INOCULATION AGAINST INFLUENZA: ICD-10-CM

## 2024-10-15 DIAGNOSIS — Z23 ENCOUNTER FOR IMMUNIZATION: ICD-10-CM

## 2024-10-15 DIAGNOSIS — E10.319 DIABETIC RETINOPATHY OF BOTH EYES ASSOCIATED WITH TYPE 1 DIABETES MELLITUS, MACULAR EDEMA PRESENCE UNSPECIFIED, UNSPECIFIED RETINOPATHY SEVERITY (H): ICD-10-CM

## 2024-10-15 DIAGNOSIS — E78.2 MIXED HYPERLIPIDEMIA: ICD-10-CM

## 2024-10-15 DIAGNOSIS — E66.01 MORBID OBESITY (H): ICD-10-CM

## 2024-10-15 DIAGNOSIS — E10.65 TYPE 1 DIABETES MELLITUS WITH HYPERGLYCEMIA (H): ICD-10-CM

## 2024-10-15 DIAGNOSIS — R63.5 WEIGHT GAIN: ICD-10-CM

## 2024-10-15 DIAGNOSIS — Z12.11 ENCOUNTER FOR SCREENING COLONOSCOPY: ICD-10-CM

## 2024-10-15 LAB
ANION GAP SERPL CALCULATED.3IONS-SCNC: 13 MMOL/L (ref 7–15)
BASOPHILS # BLD AUTO: 0 10E3/UL (ref 0–0.2)
BASOPHILS NFR BLD AUTO: 1 %
BUN SERPL-MCNC: 8.2 MG/DL (ref 6–20)
CALCIUM SERPL-MCNC: 8.5 MG/DL (ref 8.8–10.4)
CHLORIDE SERPL-SCNC: 103 MMOL/L (ref 98–107)
CREAT SERPL-MCNC: 0.81 MG/DL (ref 0.67–1.17)
EGFRCR SERPLBLD CKD-EPI 2021: >90 ML/MIN/1.73M2
EOSINOPHIL # BLD AUTO: 0.2 10E3/UL (ref 0–0.7)
EOSINOPHIL NFR BLD AUTO: 4 %
ERYTHROCYTE [DISTWIDTH] IN BLOOD BY AUTOMATED COUNT: 12.9 % (ref 10–15)
EST. AVERAGE GLUCOSE BLD GHB EST-MCNC: 154 MG/DL
GLUCOSE SERPL-MCNC: 188 MG/DL (ref 70–99)
HBA1C MFR BLD: 7 %
HCO3 SERPL-SCNC: 22 MMOL/L (ref 22–29)
HCT VFR BLD AUTO: 40.4 % (ref 40–53)
HGB BLD-MCNC: 14.8 G/DL (ref 13.3–17.7)
IMM GRANULOCYTES # BLD: 0 10E3/UL
IMM GRANULOCYTES NFR BLD: 0 %
LYMPHOCYTES # BLD AUTO: 1.2 10E3/UL (ref 0.8–5.3)
LYMPHOCYTES NFR BLD AUTO: 25 %
MCH RBC QN AUTO: 30.4 PG (ref 26.5–33)
MCHC RBC AUTO-ENTMCNC: 36.6 G/DL (ref 31.5–36.5)
MCV RBC AUTO: 83 FL (ref 78–100)
MONOCYTES # BLD AUTO: 0.7 10E3/UL (ref 0–1.3)
MONOCYTES NFR BLD AUTO: 15 %
NEUTROPHILS # BLD AUTO: 2.7 10E3/UL (ref 1.6–8.3)
NEUTROPHILS NFR BLD AUTO: 55 %
NRBC # BLD AUTO: 0 10E3/UL
NRBC BLD AUTO-RTO: 0 /100
PLATELET # BLD AUTO: 185 10E3/UL (ref 150–450)
POTASSIUM SERPL-SCNC: 4 MMOL/L (ref 3.4–5.3)
RBC # BLD AUTO: 4.87 10E6/UL (ref 4.4–5.9)
SODIUM SERPL-SCNC: 138 MMOL/L (ref 135–145)
WBC # BLD AUTO: 4.9 10E3/UL (ref 4–11)

## 2024-10-15 PROCEDURE — 90471 IMMUNIZATION ADMIN: CPT | Performed by: FAMILY MEDICINE

## 2024-10-15 PROCEDURE — 80048 BASIC METABOLIC PNL TOTAL CA: CPT

## 2024-10-15 PROCEDURE — 91320 SARSCV2 VAC 30MCG TRS-SUC IM: CPT | Performed by: FAMILY MEDICINE

## 2024-10-15 PROCEDURE — 90656 IIV3 VACC NO PRSV 0.5 ML IM: CPT | Performed by: FAMILY MEDICINE

## 2024-10-15 PROCEDURE — 83036 HEMOGLOBIN GLYCOSYLATED A1C: CPT

## 2024-10-15 PROCEDURE — 99214 OFFICE O/P EST MOD 30 MIN: CPT | Mod: 25 | Performed by: FAMILY MEDICINE

## 2024-10-15 PROCEDURE — 90480 ADMN SARSCOV2 VAC 1/ONLY CMP: CPT | Performed by: FAMILY MEDICINE

## 2024-10-15 PROCEDURE — 85025 COMPLETE CBC W/AUTO DIFF WBC: CPT

## 2024-10-15 PROCEDURE — 36415 COLL VENOUS BLD VENIPUNCTURE: CPT

## 2024-10-15 ASSESSMENT — PAIN SCALES - GENERAL: PAINLEVEL: NO PAIN (0)

## 2024-10-15 ASSESSMENT — ASTHMA QUESTIONNAIRES
QUESTION_1 LAST FOUR WEEKS HOW MUCH OF THE TIME DID YOUR ASTHMA KEEP YOU FROM GETTING AS MUCH DONE AT WORK, SCHOOL OR AT HOME: NONE OF THE TIME
QUESTION_5 LAST FOUR WEEKS HOW WOULD YOU RATE YOUR ASTHMA CONTROL: COMPLETELY CONTROLLED
ACT_TOTALSCORE: 25
ACT_TOTALSCORE: 25
QUESTION_3 LAST FOUR WEEKS HOW OFTEN DID YOUR ASTHMA SYMPTOMS (WHEEZING, COUGHING, SHORTNESS OF BREATH, CHEST TIGHTNESS OR PAIN) WAKE YOU UP AT NIGHT OR EARLIER THAN USUAL IN THE MORNING: NOT AT ALL
QUESTION_2 LAST FOUR WEEKS HOW OFTEN HAVE YOU HAD SHORTNESS OF BREATH: NOT AT ALL
QUESTION_4 LAST FOUR WEEKS HOW OFTEN HAVE YOU USED YOUR RESCUE INHALER OR NEBULIZER MEDICATION (SUCH AS ALBUTEROL): NOT AT ALL

## 2024-10-15 NOTE — LETTER
My Asthma Action Plan    Name: Yaya Mg   YOB: 1975  Date: 10/15/2024   My doctor: Selwyn Ashraf MD   [facility]        My Rescue Medicine:   Albuterol inhaler (Proair/Ventolin/Proventil HFA)  2-4 puffs EVERY 4 HOURS as needed. Use a spacer if recommended by your provider.   My Asthma Severity:     Know your asthma triggers:  none              GREEN ZONE   Good Control  I feel good  No cough or wheeze  Can work, sleep and play without asthma symptoms       Take your asthma control medicine every day.     If exercise triggers your asthma, take your rescue medication  15 minutes before exercise or sports, and  During exercise if you have asthma symptoms  Spacer to use with inhaler: If you have a spacer, make sure to use it with your inhaler             YELLOW ZONE Getting Worse  I have ANY of these:  I do not feel good  Cough or wheeze  Chest feels tight  Wake up at night   Keep taking your Green Zone medications  Start taking your rescue medicine:  every 20 minutes for up to 1 hour. Then every 4 hours for 24-48 hours.  If you stay in the Yellow Zone for more than 12-24 hours, contact your doctor.  If you do not return to the Green Zone in 12-24 hours or you get worse, start taking your oral steroid medicine if prescribed by your provider.           RED ZONE Medical Alert - Get Help  I have ANY of these:  I feel awful  Medicine is not helping  Breathing getting harder  Trouble walking or talking  Nose opens wide to breathe       Take your rescue medicine NOW  If your provider has prescribed an oral steroid medicine, start taking it NOW  Call your doctor NOW  If you are still in the Red Zone after 20 minutes and you have not reached your doctor:  Take your rescue medicine again and  Call 911 or go to the emergency room right away    See your regular doctor within 2 weeks of an Emergency Room or Urgent Care visit for follow-up treatment.          Annual  Reminders:  Meet with Asthma Educator,  Flu Shot in the Fall, consider Pneumonia Vaccination for patients with asthma (aged 19 and older).    Pharmacy:    [facility] - [place], [place] - [address] [facility] - [place], [place] - [address]   [facility] - [place], [place] - [address]ST FLO[address]  [facility] - [facility], [place] - [address] [place] [id number]     Electronically signed by Selwyn Ashraf MD   Date: 10/15/24                    Asthma Triggers  How To Control Things That Make Your Asthma Worse    Triggers are things that make your asthma worse.  Look at the list below to help you find your triggers and   what you can do about them. You can help prevent asthma flare-ups by staying away from your triggers.      Trigger                                                          What you can do   Cigarette Smoke  Tobacco smoke can make asthma worse. Do not allow smoking in your home, car or around you.  Be sure no one smokes at a child s day care or school.  If you smoke, ask your health care provider for ways to help you quit.  Ask family members to quit too.  Ask your health care provider for a referral to Quit Plan to help you quit smoking, or call 9-090-167-PLAN.     Colds, Flu, Bronchitis  These are common triggers of asthma. Wash your hands often.  Don t touch your eyes, nose or mouth.  Get a flu shot every year.     Dust Mites  These are tiny bugs that live in cloth or carpet. They are too small to see. Wash sheets and blankets in hot water every week.   Encase pillows and mattress in dust mite proof covers.  Avoid having carpet if you can. If you have carpet, vacuum weekly.   Use a dust mask and HEPA vacuum.   Pollen and Outdoor Mold  Some people are allergic to trees, grass, or weed pollen, or molds. Try to keep your windows closed.  Limit time out doors when pollen count is high.    Ask you health care provider about taking medicine during allergy season.     Animal Dander  Some people are allergic to skin flakes, urine or saliva from pets with fur or feathers. Keep pets with fur or feathers out of your home.    If you can t keep the pet outdoors, then keep the pet out of your bedroom.  Keep the bedroom door closed.  Keep pets off cloth furniture and away from stuffed toys.     Mice, Rats, and Cockroaches  Some people are allergic to the waste from these pests.   Cover food and garbage.  Clean up spills and food crumbs.  Store grease in the refrigerator.   Keep food out of the bedroom.   Indoor Mold  This can be a trigger if your home has high moisture. Fix leaking faucets, pipes, or other sources of water.   Clean moldy surfaces.  Dehumidify basement if it is damp and smelly.   Smoke, Strong Odors, and Sprays  These can reduce air quality. Stay away from strong odors and sprays, such as perfume, powder, hair spray, paints, smoke incense, paint, cleaning products, candles and new carpet.   Exercise or Sports  Some people with asthma have this trigger. Be active!  Ask your doctor about taking medicine before sports or exercise to prevent symptoms.    Warm up for 5-10 minutes before and after sports or exercise.     Other Triggers of Asthma  Cold air:  Cover your nose and mouth with a scarf.  Sometimes laughing or crying can be a trigger.  Some medicines and food can trigger asthma.

## 2024-10-15 NOTE — TELEPHONE ENCOUNTER
Screening Questions for the Scheduling of Screening Colonoscopies     (If Colonoscopy is diagnostic, Provider should review the chart before scheduling.)    Are you younger than 50 or older than 80?  No    Do you take aspirin or fish oil?  No (if yes, tell patient to stop 1 week prior to Colonoscopy)    Do you take warfarin (Coumadin), clopidogrel (Plavix), apixaban (Eliquis), dabigatram (Pradaxa), rivaroxaban (Xarelto) or any blood thinner? No    Do you use oxygen at home?  No    Do you have kidney disease? No    Are you on dialysis? No    Have you had a stroke or heart attack in the last year? No    Have you had a stent in your heart or any blood vessel in the last year? No    Have you had a transplant of any organ?  No    Have you had a colonoscopy or upper endoscopy (EGD) before?  YES. Date-2023         Date of scheduled Colonoscopy: 12/5/24    Provider: Dr. MARIETTA Nichols     [email or web address]

## 2024-10-15 NOTE — PROGRESS NOTES
Assessment & Plan     Type 1 diabetes mellitus with hyperglycemia (H)  (primary encounter diagnosis)  Comment: Overall A1c is within good range. Just using Humalog via pump currently and checks throughout the day with continuous monitor. No worsening neuropathy, sensory changes, other red flag symptoms, does not smoke/drink. Is following with [facility] for eye exam. Hoping patient will improve further w/ discontinuation of Prednisone.  Plan: Continue Humalog and follow-up in 4 months    Diabetic Retinopathy  Comment: Currently working w/ [facility], getting injections, laser treatment, started treatment in Sept and is following w/ them.  Plan: Follow-up w/ [facility]    Peripheral Neuropathy  Comment: Patient reports continued shooting pain in feet, but ho changes in symptoms over past year. Foot exam was normal. Will continue current meds.  Plan: Continue Gabapentin and follow-up as needed.    Pulmonary sarcoidosis (H)  Comment: Following w/ Pulmonologist, Dr. Zhu at [facility] in [place]. Still has coughing fits but symptoms overall controlled w/ Dulera, improvement since starting inhaler. Albuterol inhaler not effective for symptoms. Advised pt about Thrush risks and prevention. Will follow-up as needed.  Plan: Follow-up w/ Pulm, continue Dulera    Current use of steroid medication  Comment: Tapered off prednisone 1-2 months ago.    Encounter for colonoscopy  Comment: Pt is off steroids, no recent bowel or bladder changes. Will schedule colonoscopy for polyp hx.  Plan: Schedule colonosocopy.    History of colonic polyps  Comment: Patient has hx of adenomatous polyps w/ colonoscopy in 2023. Schedule another colonosocopy for this year as above.  Plan: See above.    Mixed hyperlipidemia  Comment: Lipids not checked this visit. Denies any typical CAD symptoms. Will continue statin. Follow-up as needed.  Plan: Continue Lipitor 20 mg, follow-up 4 months    Morbid obesity  "(H)  Comment: Patient has had recent weight gain despite increased exercise, no there change in habits. Likely weight gain was contributed to by prednisone regimen now discontinued, advised pt to wait and see w/ follow-up in 4 months.  Plan: Follow-up 4 months    Depression with anxiety  Comment: Lexapro working ok.  Plan: Continue Lexapro. Follow-up as needed.    Weight Gain  Comment: See above.    Globus  Comment: Patient has had globus sensation for past 3-4 months, says he thinks it started slightly before starting inhaler. No dysphagia, feels it in thyroid area. Possibly due to inhaler or post nasal drip but want to rule out other causes as can cut off breath if he positions chin to close to chest when reading.  Plan: Thyroid US and ENT referral    Encounter for Immunization  Plan: Received COVID and Flu.    Talat Gillettejose MS3 did history and eval under supervision of Dr. Ashraf on 10/15/2024       BMI  Estimated body mass index is 39.46 kg/m  as calculated from the following:    Height as of this encounter: 1.727 m (5' 8\").    Weight as of this encounter: 117.7 kg (259 lb 8 oz).       No follow-ups on file.    Subjective   Gabriel is a 49 year old, presenting for the following health issues:  Diabetes, Lipids, Depression, and Anxiety        10/15/2024    10:26 AM   Additional Questions   Roomed by Janki ALVAREZ   Accompanied by self     History of Present Illness       Diabetes:   He presents for follow up of diabetes.   He is checking home blood glucose with a continuous glucose monitor.   He checks blood glucose before meals.  Blood glucose is sometimes over 200 and sometimes under 70. He is aware of hypoglycemia symptoms including shakiness, dizziness, weakness, lethargy, blurred vision and confusion.    He has no concerns regarding his diabetes at this time.  He is having burning in feet and weight gain.  The patient has had a diabetic eye exam in the last 12 months. Eye exam performed on octobet 2024. " Location of last eye exam [facility].        He eats 2-3 servings of fruits and vegetables daily.He consumes 1 sweetened beverage(s) daily.He exercises with enough effort to increase his heart rate 30 to 60 minutes per day.  He exercises with enough effort to increase his heart rate 3 or less days per week.   He is taking medications regularly.       Has had continued wt gain. Doing more exercise than before, no chx in diet, doesn't eat a lot., no nicotine or alcohol.    Appt w/ Pulmonologist, Dr. Zhu at [facility] in [place]. - Couldn't walk up stairs but has gotten better but still has coughing fits. Dulera, been taking it for awhile, 2x in morning, 2x at night. Albuterol inhaler doesn't help.    No recent sickness and flu. Goes for walks for a mile couple times a week. Any exercise he sweats profusely. Doing work in the woods, has to take a lot of breaks. Can't run or gets SOB  Working w/ [facility] [place], [id number] Month, gets laser. Dr. Moura in [place].    Eye changes - spiderweb over L eye, moves w/ eye. Right eye is worse than L eye, Noticed on 15th, Sept 17th. Had last injection at end of Sept, Oct laser on Oct 7. Additional sched for November.    Feet - taking Gabapentin or can't sleep due to shooting pain. Well over a year, been about the same.    Has continuous BG monitor. Says he's always looking at it.    Just on Humalog, no longer using Novolog due to insurance.    No longer taking prednisone, weaned off 1-2 months ago.    No recent CP/Palp/HA. Denies recent N/V, No recent bladder or bladder changes    Has lump in his throat. Past few months, doesn't know cause, wonders if inhaler, had sx before inhaler. Not aware of bread breath. Says he feels the lump just above throat, felt ball in throat, so had to lift head up - cutting off air    Diabetes Follow-up    How often are you checking your blood sugar? Continuous glucose monitor  What time of day are you checking your blood  sugars (select all that apply)?  Before meals  Have you had any blood sugars above 200?  Yes couple times a week   Have you had any blood sugars below 70?  Yes couple times a week   What symptoms do you notice when your blood sugar is low?  Shaky, Dizzy, Weak, Lethargy, Blurred vision, and Confusion  What concerns do you have today about your diabetes? None   Do you have any of these symptoms? (Select all that apply)  Burning in feet and Weight gain  Have you had a diabetic eye exam in the last 12 months? Yes- Date of last eye exam: October 2024,  Location: [facility]     Diabetic Foot Screen:  Any complaints of increased pain or numbness ?  YES shooting, at night  Is there a foot ulcer now or a history of foot ulcer? No  Does the foot have an abnormal shape? No  Are the nails thick, too long or ingrown? No  Are there any redness or open areas? No         Sensation Testing done at all points on the diagram with monofilament     Right Foot: Sensation Normal at all points  Left Foot: Sensation Normal at all points     Risk Category: 0- No loss of protective sensation  Performed by       BP Readings from Last 2 Encounters:   10/15/24 126/88   06/10/24 (!) 104/91     Hemoglobin A1C (%)   Date Value   05/25/2024 7.1 (H)   02/15/2024 8.6 (H)     LDL Cholesterol Calculated (mg/dL)   Date Value   02/15/2024 75   04/12/2023 59             Hyperlipidemia Follow-Up    Are you regularly taking any medication or supplement to lower your cholesterol?   Yes- Lipitor 20 mg   Are you having muscle aches or other side effects that you think could be caused by your cholesterol lowering medication?  Yes- sore from doing work but unknown if related to this medication     Depression and Anxiety   How are you doing with your depression since your last visit? No change  How are you doing with your anxiety since your last visit?  No change  Are you having other symptoms that might be associated with depression or anxiety?  "No  Have you had a significant life event? No   Do you have any concerns with your use of alcohol or other drugs? No  Asthma Follow-Up    Was ACT completed today?  Yes        10/15/2024    10:33 AM   ACT Total Scores   ACT TOTAL SCORE (Goal Greater than or Equal to 20) 25   In the past 12 months, how many times did you visit the emergency room for your asthma without being admitted to the hospital? 0   In the past 12 months, how many times were you hospitalized overnight because of your asthma? 0        How many days per week do you miss taking your asthma controller medication?  I do not have an asthma controller medication  Please describe any recent triggers for your asthma: None  Have you had any Emergency Room Visits, Urgent Care Visits, or Hospital Admissions since your last office visit?  No    Social History     Tobacco Use    Smoking status: Never     Passive exposure: Never    Smokeless tobacco: Never   Vaping Use    Vaping status: Never Used   Substance Use Topics    Alcohol use: Yes     Comment: socially    Drug use: Never         12/15/2021     7:00 AM 11/10/2022     2:00 PM   PHQ   PHQ-9 Total Score 4 3   Q9: Thoughts of better off dead/self-harm past 2 weeks Not at all Not at all         12/15/2021     7:00 AM 11/10/2022     2:00 PM   SIOBHAN-7 SCORE   Total Score 0 1       Suicide Assessment Five-step Evaluation and Treatment (SAFE-T)      Review of Systems  Constitutional, neuro, ENT, endocrine, pulmonary, cardiac, gastrointestinal, genitourinary, musculoskeletal, integument and psychiatric systems are negative, except as otherwise noted.      Objective    /88 (BP Location: Right arm, Patient Position: Sitting, Cuff Size: Adult Large)   Pulse 74   Temp 97.3  F (36.3  C) (Tympanic)   Ht 1.727 m (5' 8\")   Wt 117.7 kg (259 lb 8 oz)   SpO2 98%   BMI 39.46 kg/m    Body mass index is 39.46 kg/m .  Physical Exam   GENERAL: alert and no distress  EYES: Eyes grossly normal to inspection, L eye " subconjunctival hemorrhage  HENT: ear canals and TM's normal, nose and mouth without ulcers or lesions  NECK: no adenopathy, no asymmetry, masses, or scars  RESP: lungs clear to auscultation - no rales, rhonchi or wheezes  CV: regular rate and rhythm, normal S1 S2, no S3 or S4, no murmur, click or rub, no peripheral edema  ABDOMEN: soft, nontender, no hepatosplenomegaly, no masses  MS: no gross musculoskeletal defects noted, no edema  SKIN: no suspicious lesions or rashes  NEURO: Normal strength and tone, mentation intact and speech normal  PSYCH: mentation appears normal, affect normal/bright  Diabetic foot exam: normal DP and PT pulses, no trophic changes or ulcerative lesions, normal sensory exam, and normal monofilament exam        Signed Electronically by: Selwyn Ashraf MD

## 2024-10-16 ENCOUNTER — HOSPITAL ENCOUNTER (OUTPATIENT)
Dept: ULTRASOUND IMAGING | Facility: HOSPITAL | Age: 49
Discharge: HOME | End: 2024-10-16
Attending: FAMILY MEDICINE | Admitting: FAMILY MEDICINE
Payer: COMMERCIAL

## 2024-10-16 DIAGNOSIS — R09.A2 GLOBUS SENSATION: ICD-10-CM

## 2024-10-16 PROCEDURE — 76536 US EXAM OF HEAD AND NECK: CPT

## 2024-10-24 RX ORDER — BISACODYL 5 MG/1
10 TABLET, DELAYED RELEASE ORAL ONCE
Qty: 2 TABLET | Refills: 0 | Status: SHIPPED | OUTPATIENT
Start: 2024-10-24 | End: 2024-10-24

## 2024-10-24 NOTE — TELEPHONE ENCOUNTER
Patient scheduled for screening colonoscopy on 12/19/24  with Dr. Simone Madison bowel preparation and script  sent to [facility] .   Patient does  need a pre-op. My chart message sent to patient to schedule preop and note sent to clinic to schedule patient as well.  Guide to your Colonoscopy or Upper GI Endoscopy and prep instructions sent to patient via US Mail.

## 2024-10-30 ENCOUNTER — MYC MEDICAL ADVICE (OUTPATIENT)
Dept: FAMILY MEDICINE | Facility: OTHER | Age: 49
End: 2024-10-30

## 2024-11-18 ENCOUNTER — OFFICE VISIT (OUTPATIENT)
Dept: OTOLARYNGOLOGY | Facility: OTHER | Age: 49
End: 2024-11-18
Attending: OTOLARYNGOLOGY
Payer: COMMERCIAL

## 2024-11-18 VITALS
BODY MASS INDEX: 39.33 KG/M2 | HEIGHT: 68 IN | WEIGHT: 259.48 LBS | SYSTOLIC BLOOD PRESSURE: 148 MMHG | HEART RATE: 87 BPM | DIASTOLIC BLOOD PRESSURE: 87 MMHG | OXYGEN SATURATION: 97 % | TEMPERATURE: 97 F | RESPIRATION RATE: 16 BRPM

## 2024-11-18 DIAGNOSIS — D86.0 PULMONARY SARCOIDOSIS (H): ICD-10-CM

## 2024-11-18 DIAGNOSIS — R09.A2 GLOBUS SENSATION: ICD-10-CM

## 2024-11-18 DIAGNOSIS — J34.2 DNS (DEVIATED NASAL SEPTUM): ICD-10-CM

## 2024-11-18 DIAGNOSIS — R09.A2 GLOBUS PHARYNGEUS: Primary | ICD-10-CM

## 2024-11-18 DIAGNOSIS — K21.9 LPRD (LARYNGOPHARYNGEAL REFLUX DISEASE): ICD-10-CM

## 2024-11-18 RX ORDER — ESOMEPRAZOLE MAGNESIUM 40 MG/1
40 CAPSULE, DELAYED RELEASE ORAL
Qty: 90 CAPSULE | Refills: 5 | Status: SHIPPED | OUTPATIENT
Start: 2024-11-18 | End: 2025-02-16

## 2024-11-18 ASSESSMENT — PAIN SCALES - GENERAL: PAINLEVEL_OUTOF10: NO PAIN (0)

## 2024-11-18 NOTE — PROGRESS NOTES
Otolaryngology Consultation    Patient: Yaya Mg  : 1975    Patient presents with:  Throat Problem: Globus sensation// Selwyn Ashraf MD      HPI:  Yaya Mg is a 49 year old male with a   History of pulmonary sarcoidosis, morbid obesity and type 1 diabetes presents for evaluation of globus pharyngeus.    He feels he has a sensation like a lump of dry bread suck in his mid throat    Symptoms have been present for 4 to 6 months.  He has been on prednisone for pulmonary sarcoidosis [facility]    No weight loss, fever, chills or night sweats    No dysphonia  No stridor    He denies nasal obstruction allergic rhinitis or purulent rhinorrhea.  He has occasional postnasal drainage.    DM  controlled  Hx jv, cpap compliant    Reflux :  none, however he has noticed a frequent sensation of stomach upset for the past several months    EGD none    Ultrasound thyroid dated 10/16/2024 is negative for nodule or goiter no cervical adenopathy noted    Soc:  Never smoker or vapor  No history of heavy alcohol use  Adequate water intake denies excess caffeine    Current Outpatient Rx   Medication Sig Dispense Refill    atorvastatin (LIPITOR) 20 MG tablet Take 1 tablet (20 mg) by mouth daily 90 tablet 1    Continuous Blood Gluc  (DEXCOM G6 ) JUSTEN       Continuous Blood Gluc Sensor (DEXCOM G6 SENSOR) MISC USE ONE SENSOR EVERY 10 DAYS, CHANGE EVERY 10 DAYS 9 each 4    Continuous Glucose Transmitter (DEXCOM G6 TRANSMITTER) MISC CHANGE TRANSMITTER EVERY 3 MONTHS 1 each 3    DULERA 200-5 MCG/ACT inhaler Inhale 2 puffs into the lungs every 12 hours      escitalopram (LEXAPRO) 20 MG tablet TAKE 1 TABLET DAILY 90 tablet 3    gabapentin (NEURONTIN) 300 MG capsule TAKE 1 TO 2 CAPSULES AT NIGHT AS NEEDED 180 capsule 3    insulin aspart (NOVOLOG VIAL) 100 UNITS/ML vial Uses 150 units daily in his pump 50 mL 4    Insulin Infusion Pump (T: SLIM X2 INS /CONTROL 7.4)  JUSTEN       Insulin Infusion Pump Supplies (AUTOSOFT 90 INFUSION SET) MISC USE 1 INFUSION SET EVERY 48 HOURS 40 each 3    Insulin Infusion Pump Supplies (T:SLIM X2 3ML CARTRIDGE) MISC USE 1 CARTRIDGE EVERY 48 HOURS 40 each 3    insulin lispro (HUMALOG) 100 UNIT/ML vial To be used with insulin pump total daily dose 150 units 140 mL 4    albuterol (PROAIR HFA/PROVENTIL HFA/VENTOLIN HFA) 108 (90 Base) MCG/ACT inhaler  (Patient not taking: Reported on 11/18/2024)      lisinopril (ZESTRIL) 5 MG tablet TAKE 1 TABLET BY MOUTH EVERY DAY (Patient not taking: Reported on 11/18/2024) 90 tablet 3    predniSONE (DELTASONE) 10 MG tablet  (Patient not taking: Reported on 11/18/2024)      predniSONE (DELTASONE) 20 MG tablet Take 20 mg by mouth 2 times daily (Patient not taking: Reported on 11/18/2024)      sulfamethoxazole-trimethoprim (BACTRIM DS) 800-160 MG tablet Take 1 tablet by mouth three times a week (Patient not taking: Reported on 11/18/2024)         Allergies: Patient has no known allergies.     Past Medical History:   Diagnosis Date    Anxiety     Depressive disorder     Hypertension     Type 1 diabetes (H)        Past Surgical History:   Procedure Laterality Date    APPENDECTOMY  01/1985    BACK SURGERY  2010    lumbar calcium deposits removed    CATARACT EXTRACTION Right 2021    COLONOSCOPY N/A 2/2/2023    Procedure: COLONOSCOPY with polypectomy;  Surgeon: Gildardo Nichols MD;  Location: [facility]    IMPLANT PROSTHESIS PENIS INFLATABLE N/A 1/3/2023    Procedure: INSERTION of INFLATABLE PENILE PROSTHESIS;  Surgeon: Amandeep Hill MD;  Location: [facility]       ENT family history reviewed    Social History     Tobacco Use    Smoking status: Never     Passive exposure: Never    Smokeless tobacco: Never   Vaping Use    Vaping status: Never Used   Substance Use Topics    Alcohol use: Yes     Comment: socially    Drug use: Never       Review of Systems  ROS: 10 point ROS neg other than the symptoms noted above in the HPI  "and hot and cold intolerance blurred vision shortness of breath on exertion occasional postnasal drainage    Physical Exam  BP (!) 148/87 (BP Location: Right arm, Patient Position: Sitting, Cuff Size: Adult Large)   Pulse 87   Temp 97  F (36.1  C) (Tympanic)   Resp 16   Ht 1.727 m (5' 7.99\")   Wt 117.7 kg (259 lb 7.7 oz)   SpO2 97%   BMI 39.46 kg/m    General - The patient is well nourished and well developed, and appears to have good nutritional status.  Alert and oriented to person and place, answers questions and cooperates with examination appropriately.   Head and Face - Normocephalic and atraumatic, with no gross asymmetry noted.  The facial nerve is intact, with strong symmetric movements.  Voice and Breathing - The patient was breathing comfortably without the use of accessory muscles. There was no wheezing, stridor, or stertor.  The patients voice was clear and strong, and had appropriate pitch and quality.  No terra peripheral digital clubbing or cyanosis   Ears -The external auditory canals are patent, the tympanic membranes are intact without effusion, retraction or mass.  Bony landmarks are intact.  Eyes - Extraocular movements intact, and the pupils were reactive to light.  Sclera were not icteric or injected, conjunctiva were pink and moist.  Mouth - Examination of the oral cavity showed pink, healthy oral mucosa. No lesions or ulcerations noted.  The tongue was mobile and midline, and the dentition were in good condition.    Throat - The walls of the oropharynx were smooth, pink, moist, symmetric, and had no lesions or ulcerations.  The tonsillar pillars and soft palate were symmetric.  The uvula was midline on elevation.  Grade 2 symmetric tonsils tongue base symmetric no mass.  Pickard Palate Position IIa  Neck - No palpable enlarged fixed cervical lymph nodes.  No neck cysts or unusual tenderness to palpation.   No palpable fixed thyroid nodules or concerning goiter.  The trachea is " grossly midline.   Nose - External contour is symmetric, no gross deflection or scars.  Nasal mucosa is pink and moist with no abnormal mucus.  The septum and turbinates were evaluated.  No polyps, masses, or purulence noted on examination.    Attempts at mirror laryngoscopy were not possible due to gag reflex.  Therefore I proceeded with a fiberoptic examination after informed consent.  First I applied topical nasal lidocaine and neosynephrine.  I then passed the scope through the nasal cavity.  No nasal edema purulence or polyposis.  Deviated nasal septum left with contact along the posterior floor and 70% obstruction.    The nasopharynx was mucosally covered and symmetric.  The eustachian tube openings were unobstructed.  Going further down I had a clear view of the base of tongue which had normal appearing grade 3 lingual tonsillar tissue.  The base of tongue was free of lesions, and the vallecula was open.  The epiglottis was smooth and mucosally covered.  Small rests of lymphoid tissue along the laryngeal epiglottis , normal variant.  no epiglottic mass.  The supraglottic larynx was then clearly visualized.  The vocal cords moved smoothly and symmetrically and were without mass or nodules.  Vocal cord mobility was normal bilaterally with phonation and respiration.  The pyriform sinuses were open and without terra mass or pooling of secretions, and clear upon valsalva.  The limited view of the subglottis was clear.   The patient tolerated the procedure well.      Impression and Plan- Yaya Mg is a 49 year old male with:      ICD-10-CM    1. Globus pharyngeus  R09.A2 esomeprazole (NEXIUM) 40 MG DR capsule      2. Globus sensation  R09.A2 Adult ENT  Referral     lidocaine 2%-oxymetazoline 0.025% nasal solution 2 spray      3. LPRD (laryngopharyngeal reflux disease)  K21.9 esomeprazole (NEXIUM) 40 MG DR capsule      4. Pulmonary sarcoidosis (H)  D86.0 esomeprazole (NEXIUM) 40 MG DR capsule       5. DNS (deviated nasal septum)  J34.2         Esomeprazole 1/2 hour before breakfast daily for 3 months  Follow-up with [facility] in 3-4 months for globus  No endolaryngeal sarcoid   If symptoms progress, CT neck and referral for EGD and 24 hr pH monitoring    Adult lifestyle changes to prevent LPR reviewed     Avoid eating and drinking within two to three hours prior to bedtime   Do not drink alcohol   Eat small meals and slowly   Limit problem foods:    o Caffeine  o Carbonated drinks  o Chocolate  o Peppermint  o Tomato  o Citrus fruits  o Fatty and fried foods     Lose weight   Quit smoking   Wear loose clothing      Rosa M Singh D.O.  Otolaryngology/Head and Neck Surgery  Allergy

## 2024-11-18 NOTE — LETTER
2024      Yaya Mg  [address]  [place] [address]      Dear Colleague,    Thank you for referring your patient, Yaya Mg, to the [facility]. Please see a copy of my visit note below.    Otolaryngology Consultation    Patient: Yaya Mg  : 1975    Patient presents with:  Throat Problem: Globus sensation// Selwyn Ashraf MD      HPI:  Yaya Mg is a 49 year old male with a   History of pulmonary sarcoidosis, morbid obesity and type 1 diabetes presents for evaluation of globus pharyngeus.    He feels he has a sensation like a lump of dry bread suck in his mid throat    Symptoms have been present for 4 to 6 months.  He has been on prednisone for pulmonary sarcoidosis and is followed by Dr. Bandar Sunshine    No weight loss, fever, chills or night sweats    No dysphonia  No stridor    He denies nasal obstruction allergic rhinitis or purulent rhinorrhea.  He has occasional postnasal drainage.    DM  controlled  Hx jv, cpap compliant    Reflux :  none, however he has noticed a frequent sensation of stomach upset for the past several months    EGD none    Ultrasound thyroid dated 10/16/2024 is negative for nodule or goiter no cervical adenopathy noted    Soc:  Never smoker or vapor  No history of heavy alcohol use  Adequate water intake denies excess caffeine    Current Outpatient Rx   Medication Sig Dispense Refill     atorvastatin (LIPITOR) 20 MG tablet Take 1 tablet (20 mg) by mouth daily 90 tablet 1     Continuous Blood Gluc  (DEXCOM G6 ) JUSTEN        Continuous Blood Gluc Sensor (DEXCOM G6 SENSOR) MISC USE ONE SENSOR EVERY 10 DAYS, CHANGE EVERY 10 DAYS 9 each 4     Continuous Glucose Transmitter (DEXCOM G6 TRANSMITTER) MISC CHANGE TRANSMITTER EVERY 3 MONTHS 1 each 3     DULERA 200-5 MCG/ACT inhaler Inhale 2 puffs into the lungs every 12 hours       escitalopram (LEXAPRO) 20 MG tablet TAKE 1 TABLET DAILY  90 tablet 3     gabapentin (NEURONTIN) 300 MG capsule TAKE 1 TO 2 CAPSULES AT NIGHT AS NEEDED 180 capsule 3     insulin aspart (NOVOLOG VIAL) 100 UNITS/ML vial Uses 150 units daily in his pump 50 mL 4     Insulin Infusion Pump (T: SLIM X2 INS /CONTROL 7.4) JUSTEN        Insulin Infusion Pump Supplies (AUTOSOFT 90 INFUSION SET) MISC USE 1 INFUSION SET EVERY 48 HOURS 40 each 3     Insulin Infusion Pump Supplies (T:SLIM X2 3ML CARTRIDGE) MISC USE 1 CARTRIDGE EVERY 48 HOURS 40 each 3     insulin lispro (HUMALOG) 100 UNIT/ML vial To be used with insulin pump total daily dose 150 units 140 mL 4     albuterol (PROAIR HFA/PROVENTIL HFA/VENTOLIN HFA) 108 (90 Base) MCG/ACT inhaler  (Patient not taking: Reported on 11/18/2024)       lisinopril (ZESTRIL) 5 MG tablet TAKE 1 TABLET BY MOUTH EVERY DAY (Patient not taking: Reported on 11/18/2024) 90 tablet 3     predniSONE (DELTASONE) 10 MG tablet  (Patient not taking: Reported on 11/18/2024)       predniSONE (DELTASONE) 20 MG tablet Take 20 mg by mouth 2 times daily (Patient not taking: Reported on 11/18/2024)       sulfamethoxazole-trimethoprim (BACTRIM DS) 800-160 MG tablet Take 1 tablet by mouth three times a week (Patient not taking: Reported on 11/18/2024)         Allergies: Patient has no known allergies.     Past Medical History:   Diagnosis Date     Anxiety      Depressive disorder      Hypertension      Type 1 diabetes (H)        Past Surgical History:   Procedure Laterality Date     APPENDECTOMY  01/1985     BACK SURGERY  2010    lumbar calcium deposits removed     CATARACT EXTRACTION Right 2021     COLONOSCOPY N/A 2/2/2023    Procedure: COLONOSCOPY with polypectomy;  Surgeon: Gildardo Nichols MD;  Location: [facility]     IMPLANT PROSTHESIS PENIS INFLATABLE N/A 1/3/2023    Procedure: INSERTION of INFLATABLE PENILE PROSTHESIS;  Surgeon: Amandeep Hill MD;  Location: [facility]       ENT family history reviewed    Social History     Tobacco Use     Smoking status: Never  "    Passive exposure: Never     Smokeless tobacco: Never   Vaping Use     Vaping status: Never Used   Substance Use Topics     Alcohol use: Yes     Comment: socially     Drug use: Never       Review of Systems  ROS: 10 point ROS neg other than the symptoms noted above in the HPI and hot and cold intolerance blurred vision shortness of breath on exertion occasional postnasal drainage    Physical Exam  BP (!) 148/87 (BP Location: Right arm, Patient Position: Sitting, Cuff Size: Adult Large)   Pulse 87   Temp 97  F (36.1  C) (Tympanic)   Resp 16   Ht 1.727 m (5' 7.99\")   Wt 117.7 kg (259 lb 7.7 oz)   SpO2 97%   BMI 39.46 kg/m    General - The patient is well nourished and well developed, and appears to have good nutritional status.  Alert and oriented to person and place, answers questions and cooperates with examination appropriately.   Head and Face - Normocephalic and atraumatic, with no gross asymmetry noted.  The facial nerve is intact, with strong symmetric movements.  Voice and Breathing - The patient was breathing comfortably without the use of accessory muscles. There was no wheezing, stridor, or stertor.  The patients voice was clear and strong, and had appropriate pitch and quality.  No terra peripheral digital clubbing or cyanosis   Ears -The external auditory canals are patent, the tympanic membranes are intact without effusion, retraction or mass.  Bony landmarks are intact.  Eyes - Extraocular movements intact, and the pupils were reactive to light.  Sclera were not icteric or injected, conjunctiva were pink and moist.  Mouth - Examination of the oral cavity showed pink, healthy oral mucosa. No lesions or ulcerations noted.  The tongue was mobile and midline, and the dentition were in good condition.    Throat - The walls of the oropharynx were smooth, pink, moist, symmetric, and had no lesions or ulcerations.  The tonsillar pillars and soft palate were symmetric.  The uvula was midline on " elevation.  Grade 2 symmetric tonsils tongue base symmetric no mass.  Pickard Palate Position IIa  Neck - No palpable enlarged fixed cervical lymph nodes.  No neck cysts or unusual tenderness to palpation.   No palpable fixed thyroid nodules or concerning goiter.  The trachea is grossly midline.   Nose - External contour is symmetric, no gross deflection or scars.  Nasal mucosa is pink and moist with no abnormal mucus.  The septum and turbinates were evaluated.  No polyps, masses, or purulence noted on examination.    Attempts at mirror laryngoscopy were not possible due to gag reflex.  Therefore I proceeded with a fiberoptic examination after informed consent.  First I applied topical nasal lidocaine and neosynephrine.  I then passed the scope through the nasal cavity.  No nasal edema purulence or polyposis.  Deviated nasal septum left with contact along the posterior floor and 70% obstruction.    The nasopharynx was mucosally covered and symmetric.  The eustachian tube openings were unobstructed.  Going further down I had a clear view of the base of tongue which had normal appearing grade 3 lingual tonsillar tissue.  The base of tongue was free of lesions, and the vallecula was open.  The epiglottis was smooth and mucosally covered.  Small rests of lymphoid tissue along the laryngeal epiglottis , normal variant.  no epiglottic mass.  The supraglottic larynx was then clearly visualized.  The vocal cords moved smoothly and symmetrically and were without mass or nodules.  Vocal cord mobility was normal bilaterally with phonation and respiration.  The pyriform sinuses were open and without terra mass or pooling of secretions, and clear upon valsalva.  The limited view of the subglottis was clear.   The patient tolerated the procedure well.      Impression and Plan- Yaya Mg is a 49 year old male with:      ICD-10-CM    1. Globus pharyngeus  R09.A2 esomeprazole (NEXIUM) 40 MG DR capsule      2. Globus  sensation  R09.A2 Adult ENT  Referral     lidocaine 2%-oxymetazoline 0.025% nasal solution 2 spray      3. LPRD (laryngopharyngeal reflux disease)  K21.9 esomeprazole (NEXIUM) 40 MG DR capsule      4. Pulmonary sarcoidosis (H)  D86.0 esomeprazole (NEXIUM) 40 MG DR capsule      5. DNS (deviated nasal septum)  J34.2         Esomeprazole 1/2 hour before breakfast daily for 3 months  Follow-up with [facility] in 3-4 months for globus  No endolaryngeal sarcoid   If symptoms progress, CT neck and referral for EGD and 24 hr pH monitoring    Adult lifestyle changes to prevent LPR reviewed      Avoid eating and drinking within two to three hours prior to bedtime    Do not drink alcohol    Eat small meals and slowly    Limit problem foods:    o Caffeine  o Carbonated drinks  o Chocolate  o Peppermint  o Tomato  o Citrus fruits  o Fatty and fried foods      Lose weight    Quit smoking    Wear loose clothing      LORENZA Aldridge.O.  Otolaryngology/Head and Neck Surgery  Allergy          Again, thank you for allowing me to participate in the care of your patient.        Sincerely,        Rosa M Singh MD

## 2024-11-18 NOTE — PATIENT INSTRUCTIONS
Esomeprazole with breakfast daily for 3 months  Follow-up with [facility] in 3-4 months for globus  No endolaryngeal sarcoid     Adult lifestyle changes to prevent LPR reviewed     Avoid eating and drinking within two to three hours prior to bedtime   Do not drink alcohol   Eat small meals and slowly   Limit problem foods:    o Caffeine  o Carbonated drinks  o Chocolate  o Peppermint  o Tomato  o Citrus fruits  o Fatty and fried foods     Lose weight   Quit smoking   Wear loose clothing    Thank you for allowing Dr. Singh and our ENT team to participate in your care.  If your medications are too expensive, please give the nurse a call.  We can possibly change this medication.  If you have a scheduling or an appointment question please contact our Health Unit Coordinator at their direct line [phone].   ALL nursing questions or concerns can be directed to your ENT nurse, Temo, at: [phone]

## 2024-11-19 ENCOUNTER — TRANSFERRED RECORDS (OUTPATIENT)
Dept: HEALTH INFORMATION MANAGEMENT | Facility: CLINIC | Age: 49
End: 2024-11-19
Payer: COMMERCIAL

## 2024-11-20 ENCOUNTER — OFFICE VISIT (OUTPATIENT)
Dept: FAMILY MEDICINE | Facility: OTHER | Age: 49
End: 2024-11-20
Attending: FAMILY MEDICINE
Payer: COMMERCIAL

## 2024-11-20 ENCOUNTER — LAB (OUTPATIENT)
Dept: LAB | Facility: OTHER | Age: 49
End: 2024-11-20
Attending: FAMILY MEDICINE
Payer: COMMERCIAL

## 2024-11-20 VITALS
TEMPERATURE: 96.9 F | WEIGHT: 256.5 LBS | SYSTOLIC BLOOD PRESSURE: 126 MMHG | OXYGEN SATURATION: 96 % | DIASTOLIC BLOOD PRESSURE: 82 MMHG | BODY MASS INDEX: 39.01 KG/M2 | HEART RATE: 74 BPM

## 2024-11-20 DIAGNOSIS — Z80.0 FH: COLON CANCER IN RELATIVE DIAGNOSED AT >50 YEARS OLD: ICD-10-CM

## 2024-11-20 DIAGNOSIS — Z86.0100 HISTORY OF COLONIC POLYPS: ICD-10-CM

## 2024-11-20 DIAGNOSIS — Z01.810 PRE-OPERATIVE CARDIOVASCULAR EXAMINATION: ICD-10-CM

## 2024-11-20 DIAGNOSIS — E10.319 DIABETIC RETINOPATHY OF BOTH EYES ASSOCIATED WITH TYPE 1 DIABETES MELLITUS, MACULAR EDEMA PRESENCE UNSPECIFIED, UNSPECIFIED RETINOPATHY SEVERITY (H): ICD-10-CM

## 2024-11-20 DIAGNOSIS — D86.0 PULMONARY SARCOIDOSIS (H): ICD-10-CM

## 2024-11-20 DIAGNOSIS — Z86.39 HISTORY OF DIABETIC KETOACIDOSIS: ICD-10-CM

## 2024-11-20 DIAGNOSIS — R09.A2 GLOBUS SENSATION: ICD-10-CM

## 2024-11-20 DIAGNOSIS — E10.65 TYPE 1 DIABETES MELLITUS WITH HYPERGLYCEMIA (H): ICD-10-CM

## 2024-11-20 DIAGNOSIS — Z01.810 PRE-OPERATIVE CARDIOVASCULAR EXAMINATION: Primary | ICD-10-CM

## 2024-11-20 PROBLEM — Z79.52 CURRENT USE OF STEROID MEDICATION: Status: RESOLVED | Noted: 2024-02-15 | Resolved: 2024-11-20

## 2024-11-20 PROBLEM — R11.2 NAUSEA AND VOMITING, UNSPECIFIED VOMITING TYPE: Status: RESOLVED | Noted: 2024-05-25 | Resolved: 2024-11-20

## 2024-11-20 PROBLEM — R79.89 ELEVATED LFTS: Status: RESOLVED | Noted: 2021-12-15 | Resolved: 2024-11-20

## 2024-11-20 LAB
ANION GAP SERPL CALCULATED.3IONS-SCNC: 10 MMOL/L (ref 7–15)
BUN SERPL-MCNC: 12.8 MG/DL (ref 6–20)
CALCIUM SERPL-MCNC: 9 MG/DL (ref 8.8–10.4)
CHLORIDE SERPL-SCNC: 104 MMOL/L (ref 98–107)
CREAT SERPL-MCNC: 0.87 MG/DL (ref 0.67–1.17)
CRP SERPL-MCNC: <3 MG/L
EGFRCR SERPLBLD CKD-EPI 2021: >90 ML/MIN/1.73M2
GLUCOSE SERPL-MCNC: 114 MG/DL (ref 70–99)
HCO3 SERPL-SCNC: 23 MMOL/L (ref 22–29)
POTASSIUM SERPL-SCNC: 3.9 MMOL/L (ref 3.4–5.3)
SODIUM SERPL-SCNC: 137 MMOL/L (ref 135–145)

## 2024-11-20 PROCEDURE — 86140 C-REACTIVE PROTEIN: CPT

## 2024-11-20 PROCEDURE — 80048 BASIC METABOLIC PNL TOTAL CA: CPT

## 2024-11-20 PROCEDURE — 36415 COLL VENOUS BLD VENIPUNCTURE: CPT

## 2024-11-20 RX ORDER — BISACODYL 5 MG/1
TABLET, SUGAR COATED ORAL
COMMUNITY
Start: 2024-10-24

## 2024-11-20 RX ORDER — POLYETHYLENE GLYCOL 3350, SODIUM SULFATE ANHYDROUS, SODIUM BICARBONATE, SODIUM CHLORIDE, POTASSIUM CHLORIDE 236; 22.74; 6.74; 5.86; 2.97 G/4L; G/4L; G/4L; G/4L; G/4L
POWDER, FOR SOLUTION ORAL
COMMUNITY
Start: 2024-10-24

## 2024-11-20 ASSESSMENT — PAIN SCALES - GENERAL: PAINLEVEL_OUTOF10: NO PAIN (0)

## 2024-11-20 NOTE — PATIENT INSTRUCTIONS
HOLD LISINOPRIL THE MORNING OF SURGERY     STAY ON BASAL INSULIN WITH GOOD AMT OF FOOD/LIQUID CALORIES THE NIGHT BEFORE AND MOST LIKELY SURGERY WILL STOP OR HOLD PUMP INFUSION WHEN YOU GET THERE.

## 2024-11-20 NOTE — Clinical Note
I have sent in referral for EGD/  asking to add EGD due to Globus sensation - please arrange to add onto Colonoscopy

## 2024-11-20 NOTE — PROGRESS NOTES
Preoperative Evaluation  [facility] [place]  [address]  [place] [address]  Phone: [phone]  Primary Provider: Selwyn Ashraf MD  Pre-op Performing Provider: Selwyn Ashraf MD  Nov 20, 2024 11/20/2024   Surgical Information   What procedure is being done? Colonoscopy    Facility or Hospital where procedure/surgery will be performed: [facility]   Who is doing the procedure / surgery? Dr. Nichols    Date of surgery / procedure: 12/19/24    Time of surgery / procedure: Unknown    Where do you plan to recover after surgery? at home with family        Patient-reported     Fax number for surgical facility: Note does not need to be faxed, will be available electronically in Epic.    Assessment & Plan     The proposed surgical procedure is considered LOW risk.      ICD-10-CM    1. Pre-operative cardiovascular examination  Z01.810 CRP inflammation     Basic metabolic panel      2. History of colonic polyps  Z86.0100 CRP inflammation     Basic metabolic panel      3. Globus sensation  R09.A2 CRP inflammation     Basic metabolic panel     Adult GI  Referral - Procedure Only      4. Type 1 diabetes mellitus with hyperglycemia (H)  E10.65 CRP inflammation     Basic metabolic panel      5. Diabetic retinopathy of both eyes associated with type 1 diabetes mellitus, macular edema presence unspecified, unspecified retinopathy severity (H)  E10.319 CRP inflammation     Basic metabolic panel      6. Pulmonary sarcoidosis (H)  D86.0 CRP inflammation     Basic metabolic panel      7. History of diabetic ketoacidosis  Z86.39 CRP inflammation     Basic metabolic panel      8. FH: colon cancer in relative diagnosed at >50 years old  Z80.0                Implanted Device  Has insulin pump        - No identified additional risk factors other than previously addressed    Antiplatelet or Anticoagulation Medication Instructions   - Patient is on no antiplatelet or anticoagulation  medications.    Additional Medication Instructions  Take all scheduled medications on the day of surgery EXCEPT for modifications listed below:  HOLD LISINOPRIL THE MORNING OF SURGERY     STAY ON BASAL INSULIN WITH GOOD AMT OF FOOD/LIQUID CALORIES THE NIGHT BEFORE AND MOST LIKELY SURGERY WILL STOP OR HOLD PUMP INFUSION WHEN YOU GET THERE.   Recommendation  Approval given to proceed with proposed procedure, without further diagnostic evaluation.    Subjective   Gabriel is a 49 year old, presenting for the following:  Pre-Op Exam          11/20/2024     8:34 AM   Additional Questions   Roomed by Mack Perla   Accompanied by Self         11/20/2024     8:34 AM   Patient Reported Additional Medications   Patient reports taking the following new medications none     HPI related to upcoming procedure:   50 yO male  presents for cardiopulmonary/general clearance to undergo the above procedure.  His surgeon listed above has asked for this clearance to undergo anesthesia. Pt has had this condition for approximately  year or so.   Overall pt is of good health and has not  had any perioperative complications with previous surgeries.        Recent persistant Globus sensation -- Also asking for EGD   Recent Colonoscopy year ago - Tubovillious polyp    Recent Sarcoidosis. Treated with steroid - off steroids - doing much better.  NO stress steroid dose needed   PFT stable via [facility]    Type 1 DM-- some brittleness. Has tipped easy to DKA- pt watches closely        11/20/2024   Pre-Op Questionnaire   Have you ever had a heart attack or stroke? No    Have you ever had surgery on your heart or blood vessels, such as a stent placement, a coronary artery bypass, or surgery on an artery in your head, neck, heart, or legs? No    Do you have chest pain with activity? No    Do you have a history of heart failure? No    Do you currently have a cold, bronchitis or symptoms of other infection? No    Do you have a cough, shortness of  breath, or wheezing? No    Do you or anyone in your family have previous history of blood clots? No    Do you or does anyone in your family have a serious bleeding problem such as prolonged bleeding following surgeries or cuts? No    Have you ever had problems with anemia or been told to take iron pills? No    Have you had any abnormal blood loss such as black, tarry or bloody stools? No    Have you ever had a blood transfusion? No    Are you willing to have a blood transfusion if it is medically needed before, during, or after your surgery? Yes    Have you or any of your relatives ever had problems with anesthesia? No    Do you have sleep apnea, excessive snoring or daytime drowsiness? (!) YES    Do you have a CPAP machine? Yes    Do you have any artifical heart valves or other implanted medical devices like a pacemaker, defibrillator, or continuous glucose monitor? (!) YES    What type of device do you have? CGM    Name of the clinic that manages your device [facility]    Do you have artificial joints? No    Are you allergic to latex? No        Patient-reported     Health Care Directive  Patient does not have a Health Care Directive: Discussed advance care planning with patient; however, patient declined at this time.    Preoperative Review of    reviewed - no record of controlled substances prescribed.      Status of Chronic Conditions:  See problem list for active medical problems.  Problems all longstanding and stable, except as noted/documented.  See ROS for pertinent symptoms related to these conditions.    Patient Active Problem List    Diagnosis Date Noted    Type 1 diabetes mellitus with hyperglycemia (H) 10/15/2024     Priority: Medium    Tachycardia 05/25/2024     Priority: Medium    Nausea and vomiting, unspecified vomiting type 05/25/2024     Priority: Medium    DKA (diabetic ketoacidosis) (H) 05/25/2024     Priority: Medium    Current use of steroid medication 02/15/2024     Priority: Medium     Pulmonary sarcoidosis (H) 02/15/2024     Priority: Medium    Morbid obesity (H) 11/10/2022     Priority: Medium    Umbilical hernia without obstruction and without gangrene 11/10/2022     Priority: Medium    Burning sensation of feet 11/10/2022     Priority: Medium    FH: colon cancer in relative diagnosed at >50 years old 11/10/2022     Priority: Medium    Erectile dysfunction due to diseases classified elsewhere 12/15/2021     Priority: Medium    Type 1 diabetes mellitus without complication (H) 12/15/2021     Priority: Medium    Depression with anxiety 12/15/2021     Priority: Medium    Mixed hyperlipidemia 12/15/2021     Priority: Medium    Elevated LFTs 12/15/2021     Priority: Medium      Past Medical History:   Diagnosis Date    Anxiety     Depressive disorder     Hypertension     Type 1 diabetes (H)      Past Surgical History:   Procedure Laterality Date    APPENDECTOMY  01/1985    BACK SURGERY  2010    lumbar calcium deposits removed    CATARACT EXTRACTION Right 2021    COLONOSCOPY N/A 02/02/2023    Procedure: COLONOSCOPY with polypectomy;  Surgeon: Gildardo Nichols MD;  Location: [facility]    IMPLANT PROSTHESIS PENIS INFLATABLE N/A 01/03/2023    Procedure: INSERTION of INFLATABLE PENILE PROSTHESIS;  Surgeon: Amandeep Hill MD;  [facility]: [facility]     Current Outpatient Medications   Medication Sig Dispense Refill    atorvastatin (LIPITOR) 20 MG tablet Take 1 tablet (20 mg) by mouth daily 90 tablet 1    Continuous Blood Gluc  (DEXCOM G6 ) JUSTEN       Continuous Blood Gluc Sensor (DEXCOM G6 SENSOR) MISC USE ONE SENSOR EVERY 10 DAYS, CHANGE EVERY 10 DAYS 9 each 4    Continuous Glucose Transmitter (DEXCOM G6 TRANSMITTER) MISC CHANGE TRANSMITTER EVERY 3 MONTHS 1 each 3    DULERA 200-5 MCG/ACT inhaler Inhale 2 puffs into the lungs every 12 hours      EQ GENTLE LAXATIVE 5 MG EC tablet TAKE 2 TABLETS BY MOUTH ONCE FOR 1 DOSE. REFER TO INSTRUCTION HANDOUT      escitalopram (LEXAPRO) 20 MG tablet  TAKE 1 TABLET DAILY 90 tablet 3    esomeprazole (NEXIUM) 40 MG DR capsule Take 1 capsule (40 mg) by mouth every morning (before breakfast). Take 30-60 minutes before eating. 90 capsule 5    gabapentin (NEURONTIN) 300 MG capsule TAKE 1 TO 2 CAPSULES AT NIGHT AS NEEDED 180 capsule 3    insulin aspart (NOVOLOG VIAL) 100 UNITS/ML vial Uses 150 units daily in his pump 50 mL 4    Insulin Infusion Pump (T: SLIM X2 INS /CONTROL 7.4) JUSTEN       Insulin Infusion Pump Supplies (AUTOSOFT 90 INFUSION SET) MISC USE 1 INFUSION SET EVERY 48 HOURS 40 each 3    Insulin Infusion Pump Supplies (T:SLIM X2 3ML CARTRIDGE) MISC USE 1 CARTRIDGE EVERY 48 HOURS 40 each 3    insulin lispro (HUMALOG) 100 UNIT/ML vial To be used with insulin pump total daily dose 150 units 140 mL 4    lisinopril (ZESTRIL) 5 MG tablet TAKE 1 TABLET BY MOUTH EVERY DAY 90 tablet 3    PEG 3350-KCl-NaBcb-NaCl-NaSulf (POLYETHYLENE GLYCOL) 236 g solution TAKE 4000 ML BY MOUTH ONCE FOR 1 DOSE AS DIRECTED BY INSTRUCTION HANDOUT      albuterol (PROAIR HFA/PROVENTIL HFA/VENTOLIN HFA) 108 (90 Base) MCG/ACT inhaler  (Patient not taking: Reported on 11/18/2024)         No Known Allergies     Social History     Tobacco Use    Smoking status: Never     Passive exposure: Never    Smokeless tobacco: Never   Substance Use Topics    Alcohol use: Yes     Comment: socially     Family History   Problem Relation Age of Onset    Hypertension Father     Hyperlipidemia Father     Obesity Father     Obesity Brother     Hypertension Brother     Depression Brother      History   Drug Use Unknown             Review of Systems  Constitutional, neuro, ENT, endocrine, pulmonary, cardiac, gastrointestinal, genitourinary, musculoskeletal, integument and psychiatric systems are negative, except as otherwise noted.    Objective    /82 (BP Location: Right arm, Patient Position: Sitting, Cuff Size: Adult Large)   Pulse 74   Temp 96.9  F (36.1  C) (Tympanic)   Wt 116.3 kg (256 lb 8 oz)    "SpO2 96%   BMI 39.01 kg/m     Estimated body mass index is 39.01 kg/m  as calculated from the following:    Height as of 11/18/24: 1.727 m (5' 7.99\").    Weight as of this encounter: 116.3 kg (256 lb 8 oz).  Physical Exam  GENERAL: alert and no distress  EYES: Eyes grossly normal to inspection, PERRL and conjunctivae and sclerae normal  HENT: ear canals and TM's normal, nose and mouth without ulcers or lesions  NECK: no adenopathy, no asymmetry, masses, or scars  RESP: lungs clear to auscultation - no rales, rhonchi or wheezes  CV: regular rate and rhythm, normal S1 S2, no S3 or S4, no murmur, click or rub, no peripheral edema  ABDOMEN: soft, nontender, no hepatosplenomegaly, no masses and bowel sounds normal  MS: no gross musculoskeletal defects noted, no edema  SKIN: no suspicious lesions or rashes  NEURO: Normal strength and tone, mentation intact and speech normal  PSYCH: mentation appears normal, affect normal/bright    Recent Labs   Lab Test 10/15/24  1024 06/03/24  1603 05/25/24  2127 05/25/24  1507   HGB 14.8 14.4   < >  --     254   < >  --     131*   < > 133*   POTASSIUM 4.0 4.5   < > 4.7   CR 0.81 0.71   < > 0.80   A1C 7.0*  --   --  7.1*    < > = values in this interval not displayed.        Diagnostics  Recent Results (from the past 24 hours)   CRP inflammation    Collection Time: 11/20/24  8:25 AM   Result Value Ref Range    CRP Inflammation <3.00 <5.00 mg/L   Basic metabolic panel    Collection Time: 11/20/24  8:25 AM   Result Value Ref Range    Sodium 137 135 - 145 mmol/L    Potassium 3.9 3.4 - 5.3 mmol/L    Chloride 104 98 - 107 mmol/L    Carbon Dioxide (CO2) 23 22 - 29 mmol/L    Anion Gap 10 7 - 15 mmol/L    Urea Nitrogen 12.8 6.0 - 20.0 mg/dL    Creatinine 0.87 0.67 - 1.17 mg/dL    GFR Estimate >90 >60 mL/min/1.73m2    Calcium 9.0 8.8 - 10.4 mg/dL    Glucose 114 (H) 70 - 99 mg/dL      No EKG required for low risk surgery (cataract, skin procedure, breast biopsy, etc).  RECENT GXT " 2022  and CTA in 2023 in chart       Revised Cardiac Risk Index (RCRI)  The patient has the following serious cardiovascular risks for perioperative complications:   - No serious cardiac risks = 0 points     RCRI Interpretation: 0 points: Class I (very low risk - 0.4% complication rate)         Signed Electronically by: Selwyn Ashraf MD  A copy of this evaluation report is provided to the requesting physician.

## 2024-11-21 ENCOUNTER — MEDICAL CORRESPONDENCE (OUTPATIENT)
Dept: HEALTH INFORMATION MANAGEMENT | Facility: CLINIC | Age: 49
End: 2024-11-21
Payer: COMMERCIAL

## 2024-11-22 DIAGNOSIS — D86.0 PULMONARY SARCOIDOSIS (H): ICD-10-CM

## 2024-11-22 DIAGNOSIS — R09.A2 GLOBUS PHARYNGEUS: ICD-10-CM

## 2024-11-22 DIAGNOSIS — K21.9 LPRD (LARYNGOPHARYNGEAL REFLUX DISEASE): ICD-10-CM

## 2024-11-25 NOTE — TELEPHONE ENCOUNTER
Nexium      Last Written Prescription Date:  11/18/24  Last Fill Quantity: 90,   # refills: 5  Last Office Visit: 11/20/24  Future Office visit:    Next 5 appointments (look out 90 days)      Feb 17, 2025 10:45 AM  (Arrive by 10:30 AM)  Provider Visit with Selwyn Ashraf MD  [facility] [address]  [place] [address]  [phone]     Feb 18, 2025 8:00 AM  (Arrive by 7:45 AM)  Return Visit with Michaela Hope NP  [facility] ) [address]  [place] [address]  [phone]             Routing refill request to provider for review/approval because:

## 2024-11-26 RX ORDER — ESOMEPRAZOLE MAGNESIUM 40 MG/1
CAPSULE, DELAYED RELEASE ORAL
Refills: 0 | OUTPATIENT
Start: 2024-11-26

## 2024-11-27 ENCOUNTER — TELEPHONE (OUTPATIENT)
Dept: CARDIAC REHAB | Facility: HOSPITAL | Age: 49
End: 2024-11-27

## 2024-11-27 NOTE — TELEPHONE ENCOUNTER
11/27: Staff left voicemail for pt regarding pulmonary rehab referral. Callback was requested to [phone]. This is our first attempt.

## 2024-12-05 ENCOUNTER — OFFICE VISIT (OUTPATIENT)
Dept: SURGERY | Facility: OTHER | Age: 49
End: 2024-12-05
Attending: FAMILY MEDICINE
Payer: COMMERCIAL

## 2024-12-05 VITALS
TEMPERATURE: 96.9 F | BODY MASS INDEX: 38.99 KG/M2 | WEIGHT: 256.39 LBS | OXYGEN SATURATION: 96 % | HEART RATE: 75 BPM | DIASTOLIC BLOOD PRESSURE: 86 MMHG | RESPIRATION RATE: 16 BRPM | SYSTOLIC BLOOD PRESSURE: 131 MMHG

## 2024-12-05 DIAGNOSIS — R09.A2 GLOBUS SENSATION: ICD-10-CM

## 2024-12-05 ASSESSMENT — PAIN SCALES - GENERAL: PAINLEVEL_OUTOF10: NO PAIN (0)

## 2024-12-05 NOTE — PROGRESS NOTES
CLINIC NOTE - CONSULT  12/5/2024    Patient : Yaya Mg  Referring Physician : Selwyn Ashraf/ Dr. Singh    Reason for Referral : Upper endoscopy    This is a 49 year old male with a need for an upper endoscopy.  Upper endoscopy is needed for globus sensation.      Patient is scheduled for an EGD and colonoscopy with Dr. Nichols on 12/19/24.  He presents to discuss the EGD further as he has a globus sensation.  He denies GERD/heartburn symptoms and is taking Nexium at this time.    Past Medical History:  Past Medical History:   Diagnosis Date    Anxiety     Depressive disorder     Hypertension     Type 1 diabetes (H)        Past Surgical History:  Past Surgical History:   Procedure Laterality Date    APPENDECTOMY  01/1985    BACK SURGERY  2010    lumbar calcium deposits removed    CATARACT EXTRACTION Right 2021    COLONOSCOPY N/A 02/02/2023    Procedure: COLONOSCOPY with polypectomy;  Surgeon: Gildardo Nichols MD;  Location: [facility]    IMPLANT PROSTHESIS PENIS INFLATABLE N/A 01/03/2023    Procedure: INSERTION of INFLATABLE PENILE PROSTHESIS;  Surgeon: Amandeep Hill MD;  [facility]       Family History History:  Family History   Problem Relation Age of Onset    Hypertension Father     Hyperlipidemia Father     Obesity Father     Obesity Brother     Hypertension Brother     Depression Brother        History of Tobacco Use:  History   Smoking Status    Never   Smokeless Tobacco    Never       Current Medications:  Current Outpatient Medications   Medication Sig Dispense Refill    atorvastatin (LIPITOR) 20 MG tablet Take 1 tablet (20 mg) by mouth daily 90 tablet 1    Continuous Blood Gluc  (DEXCOM G6 ) JUSTEN       Continuous Blood Gluc Sensor (DEXCOM G6 SENSOR) MISC USE ONE SENSOR EVERY 10 DAYS, CHANGE EVERY 10 DAYS 9 each 4    Continuous Glucose Transmitter (DEXCOM G6 TRANSMITTER) MISC CHANGE TRANSMITTER EVERY 3 MONTHS 1 each 3    DULERA 200-5 MCG/ACT inhaler Inhale 2 puffs  into the lungs every 12 hours      EQ GENTLE LAXATIVE 5 MG EC tablet TAKE 2 TABLETS BY MOUTH ONCE FOR 1 DOSE. REFER TO INSTRUCTION HANDOUT      escitalopram (LEXAPRO) 20 MG tablet TAKE 1 TABLET DAILY 90 tablet 3    esomeprazole (NEXIUM) 40 MG DR capsule Take 1 capsule (40 mg) by mouth every morning (before breakfast). Take 30-60 minutes before eating. 90 capsule 5    gabapentin (NEURONTIN) 300 MG capsule TAKE 1 TO 2 CAPSULES AT NIGHT AS NEEDED 180 capsule 3    insulin aspart (NOVOLOG VIAL) 100 UNITS/ML vial Uses 150 units daily in his pump 50 mL 4    Insulin Infusion Pump (T: SLIM X2 INS /CONTROL 7.4) JUSTEN       Insulin Infusion Pump Supplies (AUTOSOFT 90 INFUSION SET) MISC USE 1 INFUSION SET EVERY 48 HOURS 40 each 3    Insulin Infusion Pump Supplies (T:SLIM X2 3ML CARTRIDGE) MIS USE 1 CARTRIDGE EVERY 48 HOURS 40 each 3    insulin lispro (HUMALOG) 100 UNIT/ML vial To be used with insulin pump total daily dose 150 units 140 mL 4    lisinopril (ZESTRIL) 5 MG tablet TAKE 1 TABLET BY MOUTH EVERY DAY 90 tablet 3    PEG 3350-KCl-NaBcb-NaCl-NaSulf (POLYETHYLENE GLYCOL) 236 g solution TAKE 4000 ML BY MOUTH ONCE FOR 1 DOSE AS DIRECTED BY INSTRUCTION HANDOUT         Allergies:  No Known Allergies    PHYSICAL EXAM:     Vital signs: /86 (BP Location: Left arm, Patient Position: Sitting, Cuff Size: Adult Large)   Pulse 75   Temp 96.9  F (36.1  C) (Tympanic)   Resp 16   Wt 116.3 kg (256 lb 6.3 oz)   SpO2 96%   BMI 38.99 kg/m     BMI: Body mass index is 38.99 kg/m .   General: Normal, healthy, cooperative, in no acute distress, alert   Skin: no rashes   Lungs: respirations are non-labored   Abdominal: non-distended   Extremities: No cyanosis, clubbing or edema noted bilaterally in Upper and Lower Extremities   Neurological: without deficit    Assessment:   49 year old male with need for upper endoscopy for globus sensation:    Plan:   Will add possible balloon dilation to patient's scheduled EGD.  Patient to have  EGD as scheduled.

## 2024-12-09 ENCOUNTER — TELEPHONE (OUTPATIENT)
Dept: CARDIAC REHAB | Facility: HOSPITAL | Age: 49
End: 2024-12-09

## 2024-12-09 NOTE — TELEPHONE ENCOUNTER
12/9: Staff attempted to call patient to schedule Pulmonary Rehab.  Voicemail left with return number of [phone].  This is the 2nd attempt.

## 2024-12-11 ENCOUNTER — ANESTHESIA EVENT (OUTPATIENT)
Dept: SURGERY | Facility: HOSPITAL | Age: 49
End: 2024-12-11
Payer: COMMERCIAL

## 2024-12-11 NOTE — ANESTHESIA PREPROCEDURE EVALUATION
Anesthesia Pre-Procedure Evaluation    Patient: Yaya Mg   MRN: [id number] : 1975        Procedure : Procedure(s):  Upper Endoscopy with Possible Dilation , colonoscopy          Past Medical History:   Diagnosis Date     Anxiety      Depressive disorder      Hypertension      Type 1 diabetes (H)       Past Surgical History:   Procedure Laterality Date     APPENDECTOMY  1985     BACK SURGERY  2010    lumbar calcium deposits removed     CATARACT EXTRACTION Right      COLONOSCOPY N/A 2023    Procedure: COLONOSCOPY with polypectomy;  Surgeon: Gildardo Nichols MD;  Location: [facility]     IMPLANT PROSTHESIS PENIS INFLATABLE N/A 2023    Procedure: INSERTION of INFLATABLE PENILE PROSTHESIS;  Surgeon: Amandeep Hill MD;  [facility]      No Known Allergies   Social History     Tobacco Use     Smoking status: Never     Passive exposure: Never     Smokeless tobacco: Never   Substance Use Topics     Alcohol use: Yes     Comment: socially      Wt Readings from Last 1 Encounters:   24 116.3 kg (256 lb 6.3 oz)        Anesthesia Evaluation   Pt has had prior anesthetic. Type: General and MAC.    No history of anesthetic complications       ROS/MED HX  ENT/Pulmonary: Comment: Pulmonary sarcoidosis - no current steroids - follows with [facility] pul  23 PFT's:: normal    24 Pulmonology f/up - steroids being tapered off (will have received total of 9 months), continue Dulera, Prior sleep study dx - updating sleep study    (+) sleep apnea, uses CPAP,   RAMAKRISHNA risk factors, snores loudly, hypertension, obese,                             (-) tobacco use   Neurologic:     (+)    peripheral neuropathy, - burning sensation in feet.                           Cardiovascular:     (+) Dyslipidemia hypertension-range: lisinopril (126/82 on 24)/ Peripheral Vascular Disease-   -  - -   Taking blood thinners (Received lovenox injection pm 24)                               Previous cardiac testing   Echo: Date: 5/2022 Results:  Global and regional left ventricular function is normal with an EF of 60-65%.  The right ventricle is normal size.  Global right ventricular function is normal.  Aortic valve is normal in structure and function.  Trace tricuspid insufficiency is present.  ________________________________    Stress Test:  Date: 5/2022 Results:     There is no prior study for comparison.      The nuclear stress test is negative for inducible myocardial ischemia   or infarction. The left ventricular ejection fraction at rest is 75%.  The   left ventricular ejection fraction at stress is 71%. Left ventricular   function is normal.     ECG Reviewed:  Date: 10/3/23 Results:  HR 70, sinus  Cath:  Date: 10/2023 Results:  Angiogram - normal    METS/Exercise Tolerance: >4 METS    Hematologic:  - neg hematologic  ROS     Musculoskeletal: Comment: Back pain      GI/Hepatic: Comment: Globus sensation    (+) GERD (11/18/24 dx with LPR - started Nexium), Asymptomatic on medication,      bowel prep,     liver disease,       Renal/Genitourinary:  - neg Renal ROS     Endo: Comment: CGM    (+) type I DM,  Last HgA1c: 7.0, date: 10/15/24, Using insulin, - using insulin pump.  Previously admitted for DM/DKA. Diabetic complications: neuropathy retinopathy.      Obesity,       Psychiatric/Substance Use:     (+) psychiatric history anxiety and depression       Infectious Disease:  - neg infectious disease ROS     Malignancy:  - neg malignancy ROS     Other:  - neg other ROS    (+)  , H/O Chronic Pain,         Physical Exam    Airway        Mallampati: II   TM distance: > 3 FB   Neck ROM: full   Mouth opening: > 3 cm    Respiratory Devices and Support         Dental       (+) Multiple crowns, permanant bridges      Cardiovascular          Rhythm and rate: regular and normal     Pulmonary           breath sounds clear to auscultation       OUTSIDE LABS:  CBC:   Lab Results   Component Value Date    WBC  "4.9 10/15/2024    WBC 6.2 06/03/2024    HGB 14.8 10/15/2024    HGB 14.4 06/03/2024    HCT 40.4 10/15/2024    HCT 39.8 (L) 06/03/2024     10/15/2024     06/03/2024     BMP:   Lab Results   Component Value Date     11/20/2024     10/15/2024    POTASSIUM 3.9 11/20/2024    POTASSIUM 4.0 10/15/2024    CHLORIDE 104 11/20/2024    CHLORIDE 103 10/15/2024    CO2 23 11/20/2024    CO2 22 10/15/2024    BUN 12.8 11/20/2024    BUN 8.2 10/15/2024    CR 0.87 11/20/2024    CR 0.81 10/15/2024     (H) 11/20/2024     (H) 10/15/2024     COAGS: No results found for: \"PTT\", \"INR\", \"FIBR\"  POC: No results found for: \"BGM\", \"HCG\", \"HCGS\"  HEPATIC:   Lab Results   Component Value Date    ALBUMIN 3.2 (L) 05/27/2024    PROTTOTAL 5.2 (L) 05/27/2024    ALT 25 05/27/2024    AST 20 05/27/2024    ALKPHOS 69 05/27/2024    BILITOTAL 0.5 05/27/2024     OTHER:   Lab Results   Component Value Date    LACT 1.1 05/25/2024    A1C 7.0 (H) 10/15/2024    SONIA 9.0 11/20/2024    MAG 1.8 06/03/2024    LIPASE 7 (L) 05/25/2024    TSH 0.64 05/25/2024    CRP <2.9 05/24/2022    SED 7 10/03/2023       Anesthesia Plan    ASA Status:  3    NPO Status:  NPO Appropriate    Anesthesia Type: MAC.     - Reason for MAC: straight local not clinically adequate              Consents    Anesthesia Plan(s) and associated risks, benefits, and realistic alternatives discussed. Questions answered and patient/representative(s) expressed understanding.     - Discussed: Risks, Benefits and Alternatives for BOTH SEDATION and the PROCEDURE were discussed     - Discussed with:  Patient      - Specific Concerns: Risks and benefits of MAC anesthetic discussed including dental damage, aspiration, loss of airway, conversion to general anesthetic, CV complications, MI, stroke, death. Pt wishes to proceed..     - Extended Intubation/Ventilatory Support Discussed: Yes.      - Patient is DNR/DNI Status: No     Use of blood products discussed: Yes.     - " "Discussed with: Patient.     Postoperative Care            Comments:    Other Comments: Reviewed 11/20 KAUSHIK Mcclelland, THOMAS DUNCAN    I have reviewed the pertinent notes and labs in the chart from the past 30 days. Any updates or changes from those notes are reflected in this note.                        # DMII: A1C = 7.0 % (Ref range: <5.7 %) within past 6 months    # Obesity: Estimated body mass index is 38.99 kg/m  as calculated from the following:    Height as of 11/18/24: 1.727 m (5' 7.99\").    Weight as of 12/5/24: 116.3 kg (256 lb 6.3 oz).             "

## 2024-12-17 DIAGNOSIS — K21.9 LPRD (LARYNGOPHARYNGEAL REFLUX DISEASE): ICD-10-CM

## 2024-12-17 DIAGNOSIS — R09.A2 GLOBUS PHARYNGEUS: ICD-10-CM

## 2024-12-17 DIAGNOSIS — D86.0 PULMONARY SARCOIDOSIS (H): ICD-10-CM

## 2024-12-18 NOTE — TELEPHONE ENCOUNTER
Nexium  Last Written Prescription Date: 11/18/24  Last Fill Quantity: 90 # of Refills: 5  Last Office Visit: 11/18/24

## 2024-12-19 ENCOUNTER — HOSPITAL ENCOUNTER (OUTPATIENT)
Facility: HOSPITAL | Age: 49
Discharge: HOME | End: 2024-12-19
Attending: SURGERY | Admitting: SURGERY
Payer: COMMERCIAL

## 2024-12-19 ENCOUNTER — ANESTHESIA (OUTPATIENT)
Dept: SURGERY | Facility: HOSPITAL | Age: 49
End: 2024-12-19
Payer: COMMERCIAL

## 2024-12-19 VITALS
RESPIRATION RATE: 18 BRPM | TEMPERATURE: 97.8 F | OXYGEN SATURATION: 93 % | BODY MASS INDEX: 37.47 KG/M2 | SYSTOLIC BLOOD PRESSURE: 115 MMHG | HEIGHT: 69 IN | HEART RATE: 81 BPM | DIASTOLIC BLOOD PRESSURE: 58 MMHG | WEIGHT: 253 LBS

## 2024-12-19 LAB — GLUCOSE BLDC GLUCOMTR-MCNC: 146 MG/DL (ref 70–99)

## 2024-12-19 PROCEDURE — 88305 TISSUE EXAM BY PATHOLOGIST: CPT | Mod: TC | Performed by: SURGERY

## 2024-12-19 PROCEDURE — 88342 IMHCHEM/IMCYTCHM 1ST ANTB: CPT | Mod: 26 | Performed by: PATHOLOGY

## 2024-12-19 PROCEDURE — 45385 COLONOSCOPY W/LESION REMOVAL: CPT | Mod: PT | Performed by: SURGERY

## 2024-12-19 PROCEDURE — 370N000017 HC ANESTHESIA TECHNICAL FEE, PER MIN: Performed by: SURGERY

## 2024-12-19 PROCEDURE — 250N000011 HC RX IP 250 OP 636: Performed by: NURSE ANESTHETIST, CERTIFIED REGISTERED

## 2024-12-19 PROCEDURE — 82962 GLUCOSE BLOOD TEST: CPT

## 2024-12-19 PROCEDURE — 999N000141 HC STATISTIC PRE-PROCEDURE NURSING ASSESSMENT: Performed by: SURGERY

## 2024-12-19 PROCEDURE — 88305 TISSUE EXAM BY PATHOLOGIST: CPT | Mod: 26 | Performed by: PATHOLOGY

## 2024-12-19 PROCEDURE — 250N000009 HC RX 250: Performed by: NURSE ANESTHETIST, CERTIFIED REGISTERED

## 2024-12-19 PROCEDURE — 710N000012 HC RECOVERY PHASE 2, PER MINUTE: Performed by: SURGERY

## 2024-12-19 PROCEDURE — 43239 EGD BIOPSY SINGLE/MULTIPLE: CPT | Performed by: SURGERY

## 2024-12-19 PROCEDURE — 360N000075 HC SURGERY LEVEL 2, PER MIN: Performed by: SURGERY

## 2024-12-19 PROCEDURE — 272N000001 HC OR GENERAL SUPPLY STERILE: Performed by: SURGERY

## 2024-12-19 RX ORDER — NALOXONE HYDROCHLORIDE 0.4 MG/ML
0.1 INJECTION, SOLUTION INTRAMUSCULAR; INTRAVENOUS; SUBCUTANEOUS
Status: DISCONTINUED | OUTPATIENT
Start: 2024-12-19 | End: 2024-12-19 | Stop reason: HOSPADM

## 2024-12-19 RX ORDER — MULTIPLE VITAMINS W/ MINERALS TAB 9MG-400MCG
1 TAB ORAL DAILY
COMMUNITY

## 2024-12-19 RX ORDER — DEXAMETHASONE SODIUM PHOSPHATE 10 MG/ML
4 INJECTION, SOLUTION INTRAMUSCULAR; INTRAVENOUS
Status: DISCONTINUED | OUTPATIENT
Start: 2024-12-19 | End: 2024-12-19 | Stop reason: HOSPADM

## 2024-12-19 RX ORDER — PROPOFOL 10 MG/ML
INJECTION, EMULSION INTRAVENOUS PRN
Status: DISCONTINUED | OUTPATIENT
Start: 2024-12-19 | End: 2024-12-19

## 2024-12-19 RX ORDER — LIDOCAINE 40 MG/G
CREAM TOPICAL
Status: DISCONTINUED | OUTPATIENT
Start: 2024-12-19 | End: 2024-12-19 | Stop reason: HOSPADM

## 2024-12-19 RX ORDER — OXYCODONE HYDROCHLORIDE 5 MG/1
5 TABLET ORAL
Status: DISCONTINUED | OUTPATIENT
Start: 2024-12-19 | End: 2024-12-19 | Stop reason: HOSPADM

## 2024-12-19 RX ORDER — ESOMEPRAZOLE MAGNESIUM 40 MG/1
40 CAPSULE, DELAYED RELEASE ORAL
Qty: 90 CAPSULE | Refills: 3 | Status: SHIPPED | OUTPATIENT
Start: 2024-12-19

## 2024-12-19 RX ORDER — LIDOCAINE HYDROCHLORIDE 20 MG/ML
INJECTION, SOLUTION INFILTRATION; PERINEURAL PRN
Status: DISCONTINUED | OUTPATIENT
Start: 2024-12-19 | End: 2024-12-19

## 2024-12-19 RX ORDER — SODIUM CHLORIDE, SODIUM LACTATE, POTASSIUM CHLORIDE, CALCIUM CHLORIDE 600; 310; 30; 20 MG/100ML; MG/100ML; MG/100ML; MG/100ML
INJECTION, SOLUTION INTRAVENOUS CONTINUOUS
Status: DISCONTINUED | OUTPATIENT
Start: 2024-12-19 | End: 2024-12-19 | Stop reason: HOSPADM

## 2024-12-19 RX ORDER — ONDANSETRON 2 MG/ML
4 INJECTION INTRAMUSCULAR; INTRAVENOUS EVERY 30 MIN PRN
Status: DISCONTINUED | OUTPATIENT
Start: 2024-12-19 | End: 2024-12-19 | Stop reason: HOSPADM

## 2024-12-19 RX ORDER — ONDANSETRON 4 MG/1
4 TABLET, ORALLY DISINTEGRATING ORAL EVERY 30 MIN PRN
Status: DISCONTINUED | OUTPATIENT
Start: 2024-12-19 | End: 2024-12-19 | Stop reason: HOSPADM

## 2024-12-19 RX ORDER — DEXMEDETOMIDINE HYDROCHLORIDE 4 UG/ML
INJECTION, SOLUTION INTRAVENOUS PRN
Status: DISCONTINUED | OUTPATIENT
Start: 2024-12-19 | End: 2024-12-19

## 2024-12-19 RX ADMIN — PROPOFOL 50 MG: 10 INJECTION, EMULSION INTRAVENOUS at 08:08

## 2024-12-19 RX ADMIN — LIDOCAINE HYDROCHLORIDE 100 MG: 20 INJECTION, SOLUTION INFILTRATION; PERINEURAL at 07:37

## 2024-12-19 RX ADMIN — DEXMEDETOMIDINE HYDROCHLORIDE 12 MCG: 4 INJECTION, SOLUTION INTRAVENOUS at 07:32

## 2024-12-19 RX ADMIN — PROPOFOL 50 MG: 10 INJECTION, EMULSION INTRAVENOUS at 08:04

## 2024-12-19 RX ADMIN — PROPOFOL 50 MG: 10 INJECTION, EMULSION INTRAVENOUS at 07:48

## 2024-12-19 RX ADMIN — DEXMEDETOMIDINE HYDROCHLORIDE 8 MCG: 4 INJECTION, SOLUTION INTRAVENOUS at 07:39

## 2024-12-19 RX ADMIN — DEXMEDETOMIDINE HYDROCHLORIDE 12 MCG: 4 INJECTION, SOLUTION INTRAVENOUS at 07:18

## 2024-12-19 RX ADMIN — PROPOFOL 100 MG: 10 INJECTION, EMULSION INTRAVENOUS at 07:43

## 2024-12-19 RX ADMIN — PROPOFOL 50 MG: 10 INJECTION, EMULSION INTRAVENOUS at 08:01

## 2024-12-19 RX ADMIN — DEXMEDETOMIDINE HYDROCHLORIDE 8 MCG: 4 INJECTION, SOLUTION INTRAVENOUS at 07:57

## 2024-12-19 RX ADMIN — PROPOFOL 50 MG: 10 INJECTION, EMULSION INTRAVENOUS at 07:51

## 2024-12-19 RX ADMIN — PROPOFOL 50 MG: 10 INJECTION, EMULSION INTRAVENOUS at 07:58

## 2024-12-19 ASSESSMENT — ACTIVITIES OF DAILY LIVING (ADL)
ADLS_ACUITY_SCORE: 44
ADLS_ACUITY_SCORE: 44

## 2024-12-19 ASSESSMENT — LIFESTYLE VARIABLES: TOBACCO_USE: 0

## 2024-12-19 NOTE — ANESTHESIA CARE TRANSFER NOTE
Patient: Yaya Mg    Procedure: Procedure(s):  Upper Endoscopy with Biopsies, colonoscopy with polypectomy       Diagnosis: Colon cancer screening [Z12.11]  Globus sensation [R09.A2]  Diagnosis Additional Information: No value filed.    Anesthesia Type:   MAC     Note:    Oropharynx: oropharynx clear of all foreign objects and spontaneously breathing  Level of Consciousness: drowsy  Oxygen Supplementation: room air    Independent Airway: airway patency satisfactory and stable  Dentition: dentition unchanged  Vital Signs Stable: post-procedure vital signs reviewed and stable  Report to RN Given: handoff report given  Patient transferred to: Phase II    Handoff Report: Identifed the Patient, Identified the Reponsible Provider, Reviewed the pertinent medical history, Discussed the surgical course, Reviewed Intra-OP anesthesia mangement and issues during anesthesia, Set expectations for post-procedure period and Allowed opportunity for questions and acknowledgement of understanding      Vitals:  Vitals Value Taken Time   BP     Temp     Pulse     Resp     SpO2         Electronically Signed By: THOMAS TILLEY CRNA  December 19, 2024  8:20 AM   Ana Herrera is a 61year old female. Patient presents with:   Foot Pain: Left 2nd toe infection - hospital f/u - has pain at all the time rated as 7/10 at all the time, has tingling - no drainage         HPI:   This pleasant patient presents to the clinic TAKE 1 TABLET BY MOUTH EVERY 8 HOURS AS NEEDED FOR PAIN Disp: 270 tablet Rfl: 0      Past Medical History:   Diagnosis Date   • Abscess of left thigh    • Acute meniscal tear of knee    • Age-related nuclear cataract of both eyes 2/10/2015   • Anal sphinct       Spouse name: Not on file      Number of children: Not on file      Years of education: Not on file      Highest education level: Not on file    Tobacco Use      Smoking status: Never Smoker      Smokeless tobacco: Never Used    Substance and S third toe. ASSESSMENT AND PLAN:   Diagnoses and all orders for this visit:    Osteomyelitis of toe of left foot ([address]    Hammertoe of left foot    Left foot pain    Pain of toe of left foot    Hammer toe of left foot        Plan:  At this particular poin plan.    Return for Surgery.     Rose Velasquez DPM  3/6/2019

## 2024-12-19 NOTE — OP NOTE
REPORT OF OPERATION  DATE OF PROCEDURE: 12/19/2024    PATIENT: Yaya Kencini    SURGERY PERFORMED:    Esophagogastroduodenoscopy with biopsies   Colonoscopy     with biopsy    with use of snare      PREOPERATIVE DIAGNOSIS:   Dysphagia   History of Colon Polyps    POSTOPERATIVE DIAGNOSIS:    Normal Esophagogastroduodenoscopy   Squamous columnar junction at 38   Colon polyps, Rectum   Diverticulosis was not identified.   Hemorrhoids  were  identified.    SURGEON: Gildardo Nichols MD    ASSISTANTS: None    ANESTHESIA: Monitored Anesthesia Care    COMPLICATIONS: None apparent    TRANSFUSIONS: None    TISSUE TO PATHOLOGY:    Duodenal, Antral, and Distal Esophageal   Polyps from Rectum    FINDINGS:   Normal Esophagogastroduodenoscopy   Colon polyps, Rectum   Diverticulosis was not identified.   Hemorrhoids  were  identified.    INDICATIONS: This is a 49 year old male in need of an Esophagogastroduodenoscopy for Dysphagia and a colonoscopy for History of Colon Polyps.  The patient will be taken to the endoscopy suite for those procedures.    DESCRIPTIONS OF PROCEDURE IN DETAIL: After consent was obtained the patient was taken to the operative suite and kevin in the left lateral decubitus position.  The patient was identified and the correct patient was confirmed. Monitored Anesthesia Care was given by anesthesia. A time out was performed verifying the correct patient and the correct procedure.  The entire operative team was in agreement.  All necessary equipment and supplies were in the room.    The endoscope was inserted into the mouth and passed without difficulty to the third portion of the duodenum.  Duodenal biopsies were taken.  The endoscope was then withdrawn through the duodenum, the duodenal bulb and pyloric channel and no abnormalities were noted.  The endoscope was brought back into the stomach and antral biopsies were obtained.  The endoscope was then retroflexed and no lesions of the fundus body or  antrum were seen.  The endoscope was straightened back out and brought into the distal esophagus and a well-defined squamocolumnar junction was identified at 43 cm. Biopsies of the distal esophagus were taken.  The endoscope was slowly withdrawn through the remaining esophagus no other abnormalities are seen,  The endoscope was withdrawn from the patient and the patient was positioned for colonoscopy.    Rectal exam was performed and no lesions of the anal canal were noted.  The colonoscope was inserted into the anus and passed without difficulty to the cecum.  The cecum was identified by the ileocecal valve, the coalescence of the tinea and the appendiceal orifice.  Upon withdrawal all walls of the colon were visualized.  Polyps were identified at Rectum.  These were removed by snare.  Tattooing their of the location was not done.  Large colon masses and colitis were not seen.colon  Diverticulosis was not seen.  Upon reaching the rectum the scope was retroflexed and internal hemorrhoids  were  seen.  The scope was straightened back out and removed from the patient.  The patient was then taken to the recovery room in stable condition tolerating the procedure well.      Prep: fair    Withdrawal time was 6 minutes.    It is recommended that the patient have another colonoscopy in 2 years.

## 2024-12-19 NOTE — ANESTHESIA POSTPROCEDURE EVALUATION
Patient: Yaya Mg    Procedure: Procedure(s):  Upper Endoscopy with Biopsies, colonoscopy with polypectomy       Anesthesia Type:  MAC    Note:  Disposition: Outpatient   Postop Pain Control: Uneventful            Sign Out: Well controlled pain   PONV: No   Neuro/Psych: Uneventful            Sign Out: Acceptable/Baseline neuro status   Airway/Respiratory: Uneventful            Sign Out: Acceptable/Baseline resp. status   CV/Hemodynamics: Uneventful            Sign Out: Acceptable CV status; No obvious hypovolemia; No obvious fluid overload   Other NRE: NONE   DID A NON-ROUTINE EVENT OCCUR? No           Last vitals:  Vitals Value Taken Time   /58 12/19/24 0830   Temp 97.8  F (36.6  C) 12/19/24 0820   Pulse 81 12/19/24 0830   Resp 18 12/19/24 0840   SpO2 93 % 12/19/24 0840       Electronically Signed By: THOMAS TILLEY CRNA  December 19, 2024  10:53 AM

## 2024-12-21 DIAGNOSIS — E10.9 TYPE 1 DIABETES MELLITUS WITHOUT COMPLICATION (H): ICD-10-CM

## 2024-12-23 LAB
PATH REPORT.COMMENTS IMP SPEC: NORMAL
PATH REPORT.FINAL DX SPEC: NORMAL
PATH REPORT.GROSS SPEC: NORMAL
PATH REPORT.MICROSCOPIC SPEC OTHER STN: NORMAL
PATH REPORT.MICROSCOPIC SPEC OTHER STN: NORMAL
PATH REPORT.RELEVANT HX SPEC: NORMAL
PHOTO IMAGE: NORMAL

## 2024-12-23 RX ORDER — INSULIN LISPRO 100 [IU]/ML
INJECTION, SOLUTION INTRAVENOUS; SUBCUTANEOUS
Qty: 140 ML | Refills: 3 | Status: SHIPPED | OUTPATIENT
Start: 2024-12-23

## 2024-12-23 NOTE — TELEPHONE ENCOUNTER
Humalog      Last Written Prescription Date:  9.21.24  Last Fill Quantity: #140mL,   # refills: 0  Last Office Visit: 11.20.24  Future Office visit:    Next 5 appointments (look out 90 days)      Feb 17, 2025 10:45 AM  (Arrive by 10:30 AM)  Provider Visit with Selwyn Ashraf MD  [facility] ([facility] ) [address]  [place] [address]  [phone]     Feb 18, 2025 8:00 AM  (Arrive by 7:45 AM)  Return Visit with Mihcaela Hope NP  [facility] ([facility] ) [address] [place] [address]  [phone]             Routing refill request to provider for review/approval because:

## 2025-01-07 ENCOUNTER — OFFICE VISIT (OUTPATIENT)
Dept: SURGERY | Facility: OTHER | Age: 50
End: 2025-01-07
Attending: NURSE PRACTITIONER
Payer: COMMERCIAL

## 2025-01-07 VITALS
HEART RATE: 82 BPM | OXYGEN SATURATION: 98 % | RESPIRATION RATE: 16 BRPM | SYSTOLIC BLOOD PRESSURE: 136 MMHG | DIASTOLIC BLOOD PRESSURE: 78 MMHG | TEMPERATURE: 97.8 F

## 2025-01-07 DIAGNOSIS — Z86.0100 HISTORY OF COLONIC POLYPS: ICD-10-CM

## 2025-01-07 DIAGNOSIS — A04.8 H. PYLORI INFECTION: Primary | ICD-10-CM

## 2025-01-07 RX ORDER — SACCHAROMYCES BOULARDII 250 MG
250 CAPSULE ORAL 2 TIMES DAILY
Qty: 28 CAPSULE | Refills: 0 | Status: SHIPPED | OUTPATIENT
Start: 2025-01-07 | End: 2025-01-21

## 2025-01-07 RX ORDER — AMOXICILLIN 500 MG/1
1000 CAPSULE ORAL 2 TIMES DAILY
Qty: 56 CAPSULE | Refills: 0 | Status: SHIPPED | OUTPATIENT
Start: 2025-01-07 | End: 2025-01-21

## 2025-01-07 RX ORDER — CLARITHROMYCIN 500 MG/1
500 TABLET ORAL 2 TIMES DAILY
Qty: 28 TABLET | Refills: 0 | Status: SHIPPED | OUTPATIENT
Start: 2025-01-07 | End: 2025-01-21

## 2025-01-07 ASSESSMENT — PAIN SCALES - GENERAL: PAINLEVEL_OUTOF10: NO PAIN (0)

## 2025-01-07 NOTE — PROGRESS NOTES
CLINIC NOTE - POST-OP ENDOSCOPY  1/7/2025    Patient:Yaya Mg    Procedure: Esophagogastroduodenoscopy with biopsy and Colonoscopy    This is a 49 year old male who is 3 weeks s/p Esophagogastroduodenoscopy with biopsy and Colonoscopy.  At the time of endoscopy a colon polyp was removed.     Current Medications:  Current Outpatient Medications   Medication Sig Dispense Refill    amoxicillin (AMOXIL) 500 MG capsule Take 2 capsules (1,000 mg) by mouth 2 times daily for 14 days. 56 capsule 0    clarithromycin (BIAXIN) 500 MG tablet Take 1 tablet (500 mg) by mouth 2 times daily for 14 days. 28 tablet 0    saccharomyces boulardii (FLORASTOR) 250 MG capsule Take 1 capsule (250 mg) by mouth 2 times daily for 14 days. 28 capsule 0    atorvastatin (LIPITOR) 20 MG tablet Take 1 tablet (20 mg) by mouth daily 90 tablet 1    Continuous Blood Gluc  (DEXCOM G6 ) JUSTEN       Continuous Blood Gluc Sensor (DEXCOM G6 SENSOR) MISC USE ONE SENSOR EVERY 10 DAYS, CHANGE EVERY 10 DAYS 9 each 4    Continuous Glucose Transmitter (DEXCOM G6 TRANSMITTER) MISC CHANGE TRANSMITTER EVERY 3 MONTHS 1 each 3    DULERA 200-5 MCG/ACT inhaler Inhale 2 puffs into the lungs every 12 hours      escitalopram (LEXAPRO) 20 MG tablet TAKE 1 TABLET DAILY 90 tablet 3    esomeprazole (NEXIUM) 40 MG DR capsule Take 1 capsule (40 mg) by mouth every morning (before breakfast). 90 capsule 3    gabapentin (NEURONTIN) 300 MG capsule TAKE 1 TO 2 CAPSULES AT NIGHT AS NEEDED 180 capsule 3    insulin aspart (NOVOLOG VIAL) 100 UNITS/ML vial Uses 150 units daily in his pump 50 mL 4    Insulin Infusion Pump (T: SLIM X2 INS /CONTROL 7.4) JUSTEN       Insulin Infusion Pump Supplies (AUTOSOFT 90 INFUSION SET) MISC USE 1 INFUSION SET EVERY 48 HOURS 40 each 3    Insulin Infusion Pump Supplies (T:SLIM X2 3ML CARTRIDGE) MISC USE 1 CARTRIDGE EVERY 48 HOURS 40 each 3    insulin lispro (HUMALOG) 100 UNIT/ML vial USE WITH INSULIN PUMP, TOTAL DAILY DOSE OF  150 UNITS 140 mL 3    lisinopril (ZESTRIL) 5 MG tablet TAKE 1 TABLET BY MOUTH EVERY DAY 90 tablet 3    multivitamin w/minerals (THERA-VIT-M) tablet Take 1 tablet by mouth daily.         Allergies:  No Known Allergies    PHYSICAL EXAM:   Vital signs: /78 (BP Location: Right arm, Patient Position: Sitting, Cuff Size: Adult Large)   Pulse 82   Temp 97.8  F (36.6  C) (Tympanic)   Resp 16   SpO2 98%    BMI: There is no height or weight on file to calculate BMI.   General: Normal, healthy, cooperative, in no acute distress, alert   Lungs: respirations are non-labored   Abdominal: non-distended       PATHOLOGY:  Case Report   Date Value Ref Range Status   12/19/2024   Final    Surgical Pathology Report                         Case: [id number]                                  Authorizing Provider:  Gildardo Nichols MD  Collected:           12/19/2024 07:46 AM          Ordering Location:     [facility] Main Operating Room     Received:            12/19/2024 12:40 PM          Pathologist:           Amandeep Scott DO                                                         Specimens:   A) - Small Intestine, Duodenum                                                                      B) - Stomach, Antrum                                                                                C) - Esophagus, Distal                                                                              D) - Rectum                                                                                 Final Diagnosis   Date Value Ref Range Status   12/19/2024   Final    A.  Duodenum, biopsy:  -Duodenal mucosa with no diagnostic abnormality.    B.  Stomach, antrum, biopsy:  -Chronic gastritis.  -Very rare microorganisms morphologically consistent with H. pylori detected immunohistochemical stain.    C.  Esophagus, distal, biopsy:  -Squamous mucosa with mild reactive changes.    D.  Rectum, polyp, polypectomy:  -Tubular adenoma.         ASSESSMENT:     49 year old male who is 3 weeks s/p Esophagogastroduodenoscopy with biopsy and Colonoscopy.  Colon polyp, H. Pylori infection    PLAN:   Pathology results were discussed with the patient.  Will start the patient on treatment for H. Pylori at this time.  He is to have a stool test for H. Pylori 2 weeks after completing treatment for H. Pylori.  He continues to note bloating and should follow up if this symptoms continue after treatment for H. Pylori.     Recommend follow-up Colonoscopy in 2 years.  Recommend follow-up Esophagogastroduodenoscopy as needed in the future with problems.

## 2025-01-07 NOTE — PATIENT INSTRUCTIONS
Take Amoxicillin and Biaxin and florastor as prescribed  Take Nexium twice daily while taking amoxicillin and biaxin  Two weeks after completing amoxicillin and biaxin take stool test  If any symptoms continue after the medications are complete, follow up for a recheck.

## 2025-01-11 ENCOUNTER — HEALTH MAINTENANCE LETTER (OUTPATIENT)
Age: 50
End: 2025-01-11

## 2025-01-16 ENCOUNTER — MYC MEDICAL ADVICE (OUTPATIENT)
Dept: SURGERY | Facility: OTHER | Age: 50
End: 2025-01-16

## 2025-01-19 DIAGNOSIS — F41.8 DEPRESSION WITH ANXIETY: ICD-10-CM

## 2025-01-20 RX ORDER — ESCITALOPRAM OXALATE 20 MG/1
20 TABLET ORAL DAILY
Qty: 90 TABLET | Refills: 1 | Status: SHIPPED | OUTPATIENT
Start: 2025-01-20

## 2025-01-20 NOTE — TELEPHONE ENCOUNTER
Escitalopram 20 mg       Last Written Prescription Date:  01/24/2024  Last Fill Quantity: 90,   # refills: 3  Last Office Visit: 11/20/2024  Future Office visit:    Next 5 appointments (look out 90 days)      Feb 17, 2025 10:45 AM  (Arrive by 10:30 AM)  Provider Visit with Selwyn [facility]raf MD  [facility] [address] [facility] [address]  [phone]     Feb 18, 2025 8:00 AM  (Arrive by 7:45 AM)  Return Visit with Michaela Hope NP  [facility] [address] [place] [facility] [address]  [phone]             Routing refill request to provider for review/approval because:  SSRIs Protocol Failed    Rerun Protocol (1/19/2025 11:21 PM)    PHQ-9 score less than 5 in past 6 months    Please review last PHQ-9 score.     SIOBHAN-7 score of less than 5 in past 6 months.    Please review last SIOBHAN-7 score.           12/15/2021     7:00 AM 11/10/2022     2:00 PM   PHQ   PHQ-9 Total Score 4 3   Q9: Thoughts of better off dead/self-harm past 2 weeks Not at all Not at all          12/15/2021     7:00 AM 11/10/2022     2:00 PM   SIOBHAN-7 SCORE   Total Score 0 1

## 2025-02-07 ENCOUNTER — APPOINTMENT (OUTPATIENT)
Dept: LAB | Facility: OTHER | Age: 50
End: 2025-02-07
Payer: COMMERCIAL

## 2025-02-10 LAB — H PYLORI AG STL QL IA: NEGATIVE

## 2025-02-15 ASSESSMENT — ANXIETY QUESTIONNAIRES
8. IF YOU CHECKED OFF ANY PROBLEMS, HOW DIFFICULT HAVE THESE MADE IT FOR YOU TO DO YOUR WORK, TAKE CARE OF THINGS AT HOME, OR GET ALONG WITH OTHER PEOPLE?: NOT DIFFICULT AT ALL
7. FEELING AFRAID AS IF SOMETHING AWFUL MIGHT HAPPEN: NOT AT ALL
GAD7 TOTAL SCORE: 6
IF YOU CHECKED OFF ANY PROBLEMS ON THIS QUESTIONNAIRE, HOW DIFFICULT HAVE THESE PROBLEMS MADE IT FOR YOU TO DO YOUR WORK, TAKE CARE OF THINGS AT HOME, OR GET ALONG WITH OTHER PEOPLE: NOT DIFFICULT AT ALL
6. BECOMING EASILY ANNOYED OR IRRITABLE: SEVERAL DAYS
1. FEELING NERVOUS, ANXIOUS, OR ON EDGE: SEVERAL DAYS
GAD7 TOTAL SCORE: 6
5. BEING SO RESTLESS THAT IT IS HARD TO SIT STILL: SEVERAL DAYS
7. FEELING AFRAID AS IF SOMETHING AWFUL MIGHT HAPPEN: NOT AT ALL
GAD7 TOTAL SCORE: 6
4. TROUBLE RELAXING: SEVERAL DAYS
2. NOT BEING ABLE TO STOP OR CONTROL WORRYING: SEVERAL DAYS
3. WORRYING TOO MUCH ABOUT DIFFERENT THINGS: SEVERAL DAYS

## 2025-02-15 ASSESSMENT — ASTHMA QUESTIONNAIRES
QUESTION_2 LAST FOUR WEEKS HOW OFTEN HAVE YOU HAD SHORTNESS OF BREATH: ONCE OR TWICE A WEEK
ACT_TOTALSCORE: 21
QUESTION_5 LAST FOUR WEEKS HOW WOULD YOU RATE YOUR ASTHMA CONTROL: WELL CONTROLLED
QUESTION_1 LAST FOUR WEEKS HOW MUCH OF THE TIME DID YOUR ASTHMA KEEP YOU FROM GETTING AS MUCH DONE AT WORK, SCHOOL OR AT HOME: NONE OF THE TIME
QUESTION_3 LAST FOUR WEEKS HOW OFTEN DID YOUR ASTHMA SYMPTOMS (WHEEZING, COUGHING, SHORTNESS OF BREATH, CHEST TIGHTNESS OR PAIN) WAKE YOU UP AT NIGHT OR EARLIER THAN USUAL IN THE MORNING: ONCE OR TWICE
ACT_TOTALSCORE: 21
QUESTION_4 LAST FOUR WEEKS HOW OFTEN HAVE YOU USED YOUR RESCUE INHALER OR NEBULIZER MEDICATION (SUCH AS ALBUTEROL): ONCE A WEEK OR LESS

## 2025-02-17 NOTE — PROGRESS NOTES
{PROVIDER CHARTING PREFERENCE:[id number]}    Subjective   Gabriel is a 49 year old, presenting for the following health issues:  Anxiety, Depression, and Diabetes        2/18/2025     9:33 AM   Additional Questions   Roomed by torey pierre   Accompanied by none       Started cardiac rehab today.  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  Pulm is Dr. Portillo at [facility].  Last seen maybe a few months ago, with plan for 6 mo follow-up.    Seeing [facility], had lasers in both eyes, not monthly injections.      Has Type 1 Diabetes, insulin pump, CGM.  Would be interested in seeing diabetic education again due to weight gain, feels like having to give more insulin than before.    Lexapro 20mg - depression/anxiety - working okay    Nexium 40mg - reflux - globus sensation - states doing better on the meds - reports normal scope 12/2024 aside from H pylori - treated and negative stool test afterwards    Gets colonoscopy q2 years due to polyps, last 12/2024    Gabapentin - diabetic neuropathy - helps him sleep due to burning pain in feet              History of Present Illness       Mental Health Follow-up:  Patient presents to follow-up on Depression & Anxiety.Patient's depression since last visit has been:  Good  The patient is not having other symptoms associated with depression.  Patient's anxiety since last visit has been:  Better  The patient is not having other symptoms associated with anxiety.  Any significant life events: job concerns and financial concerns  Patient is not feeling anxious or having panic attacks.  Patient has no concerns about alcohol or drug use.    Diabetes:   He presents for follow up of diabetes.   He is checking home blood glucose with a continuous glucose monitor.   He checks blood glucose before and after meals.  Blood glucose is sometimes over 200 and sometimes under 70. He is aware of hypoglycemia symptoms including shakiness, dizziness, weakness, lethargy, blurred vision and confusion.    He is concerned about blood sugar frequently over 200 and low blood sugar, several less than 70 in the past few weeks.   He is having burning in feet and weight gain.            He eats 2-3 servings of fruits and vegetables daily.He consumes 2 sweetened beverage(s) daily.He exercises with enough effort to increase his heart rate 9 or less minutes per day.  He exercises with enough effort to increase his heart rate 3 or less days per week.   He is taking medications regularly.       Diabetes Follow-up    How often are you checking your blood sugar? Continuous glucose monitor  What time of day are you checking your blood sugars (select all that apply)?  Before and after meals  Have you had any blood sugars above 200?  Yes ***  Have you had any blood sugars below 70?  Yes ***  What symptoms do you notice when your blood sugar is low?  Shaky, Dizzy, Weak, Lethargy, Blurred vision, and Confusion  What concerns do you have today about your diabetes? Blood sugar is often over 200 and Low blood sugar   Do you have any of these symptoms? (Select all that apply)  Burning in feet and Weight gain      BP Readings from Last 2 Encounters:   02/18/25 129/85   01/07/25 136/78     Hemoglobin A1C (%)   Date Value   10/15/2024 7.0 (H)   05/25/2024 7.1 (H)     LDL Cholesterol Calculated (mg/dL)   Date Value   02/15/2024 75   04/12/2023 59       {Reference  Diabetes Management Resources  Blood Glucose Log - 3 weeks  Blood Glucose Log with Food and Insulin Record :886844}      Depression and Anxiety   How are you doing with your depression since your last visit? No change  How are you doing with your anxiety since your last visit?  Improved   Are you having other symptoms that might be associated with depression or anxiety? No  Have you had a significant life event? Job Concerns and Financial Concerns   Do you have any concerns with your use of alcohol or other drugs? No    Social History     Tobacco Use    Smoking status: Never      "Passive exposure: Never    Smokeless tobacco: Never   Vaping Use    Vaping status: Never Used   Substance Use Topics    Alcohol use: Yes     Comment: socially    Drug use: Never         12/15/2021     7:00 AM 11/10/2022     2:00 PM   PHQ   PHQ-9 Total Score 4 3   Q9: Thoughts of better off dead/self-harm past 2 weeks Not at all Not at all         12/15/2021     7:00 AM 11/10/2022     2:00 PM 2/15/2025     9:24 AM   SIOBHAN-7 SCORE   Total Score   6 (mild anxiety)   Total Score 0 1 6        Patient-reported     {Last PHQ9 or GAD7 Responses (Optional):937697}    Suicide Assessment Five-step Evaluation and Treatment (SAFE-T)  {Provider  Link to Depression Care Package SmartSet :440879}    {additonal problems for provider to add (Optional):997539}    {ROS Picklists (Optional):080278}      Objective    /85 (BP Location: Left arm, Patient Position: Sitting, Cuff Size: Adult Large)   Pulse 75   Temp 97.1  F (36.2  C) (Tympanic)   Resp 16   Ht 1.753 m (5' 9\")   Wt 116.6 kg (257 lb 1.6 oz)   SpO2 97%   BMI 37.97 kg/m    Body mass index is 37.97 kg/m .  Physical Exam   {Exam List (Optional):242813}    {Diagnostic Test Results (Optional):533745}        Signed Electronically by: SUSANNA SHEA DO  {Email feedback regarding this note to [email or web address]   :155705}  " "Location: Left arm, Patient Position: Sitting, Cuff Size: Adult Large)   Pulse 75   Temp 97.1  F (36.2  C) (Tympanic)   Resp 16   Ht 1.753 m (5' 9\")   Wt 116.6 kg (257 lb 1.6 oz)   SpO2 97%   BMI 37.97 kg/m    Body mass index is 37.97 kg/m .  Physical Exam  Constitutional:       General: He is not in acute distress.     Appearance: Normal appearance.   Neck:      Vascular: No carotid bruit.   Cardiovascular:      Rate and Rhythm: Normal rate and regular rhythm.      Heart sounds: Normal heart sounds. No murmur heard.  Pulmonary:      Effort: Pulmonary effort is normal.      Breath sounds: Normal breath sounds.   Abdominal:      General: Bowel sounds are normal.      Palpations: Abdomen is soft.      Tenderness: There is no abdominal tenderness.   Lymphadenopathy:      Cervical: No cervical adenopathy.   Neurological:      Mental Status: He is alert and oriented to person, place, and time.                    Signed Electronically by: SUSANNA SHEA DO    "

## 2025-02-18 ENCOUNTER — HOSPITAL ENCOUNTER (OUTPATIENT)
Dept: CARDIAC REHAB | Facility: HOSPITAL | Age: 50
Discharge: HOME | End: 2025-02-18
Attending: FAMILY MEDICINE
Payer: COMMERCIAL

## 2025-02-18 ENCOUNTER — OFFICE VISIT (OUTPATIENT)
Dept: FAMILY MEDICINE | Facility: OTHER | Age: 50
End: 2025-02-18
Attending: FAMILY MEDICINE
Payer: COMMERCIAL

## 2025-02-18 VITALS
DIASTOLIC BLOOD PRESSURE: 85 MMHG | TEMPERATURE: 97.1 F | SYSTOLIC BLOOD PRESSURE: 129 MMHG | BODY MASS INDEX: 38.08 KG/M2 | OXYGEN SATURATION: 97 % | HEART RATE: 75 BPM | WEIGHT: 257.1 LBS | HEIGHT: 69 IN | RESPIRATION RATE: 16 BRPM

## 2025-02-18 DIAGNOSIS — Z11.4 SCREENING FOR HIV (HUMAN IMMUNODEFICIENCY VIRUS): ICD-10-CM

## 2025-02-18 DIAGNOSIS — E78.2 MIXED HYPERLIPIDEMIA: ICD-10-CM

## 2025-02-18 DIAGNOSIS — E10.9 TYPE 1 DIABETES MELLITUS WITHOUT COMPLICATION (H): ICD-10-CM

## 2025-02-18 DIAGNOSIS — R20.8 BURNING SENSATION OF FEET: ICD-10-CM

## 2025-02-18 DIAGNOSIS — Z11.59 NEED FOR HEPATITIS C SCREENING TEST: Primary | ICD-10-CM

## 2025-02-18 DIAGNOSIS — D86.0 PULMONARY SARCOIDOSIS: ICD-10-CM

## 2025-02-18 DIAGNOSIS — K21.9 LPRD (LARYNGOPHARYNGEAL REFLUX DISEASE): ICD-10-CM

## 2025-02-18 DIAGNOSIS — F41.8 DEPRESSION WITH ANXIETY: ICD-10-CM

## 2025-02-18 DIAGNOSIS — R09.A2 GLOBUS PHARYNGEUS: ICD-10-CM

## 2025-02-18 LAB
ANION GAP SERPL CALCULATED.3IONS-SCNC: 8 MMOL/L (ref 7–15)
BUN SERPL-MCNC: 10.2 MG/DL (ref 6–20)
CALCIUM SERPL-MCNC: 9 MG/DL (ref 8.8–10.4)
CHLORIDE SERPL-SCNC: 101 MMOL/L (ref 98–107)
CHOLEST SERPL-MCNC: 167 MG/DL
CREAT SERPL-MCNC: 0.83 MG/DL (ref 0.67–1.17)
CREAT UR-MCNC: 116 MG/DL
EGFRCR SERPLBLD CKD-EPI 2021: >90 ML/MIN/1.73M2
EST. AVERAGE GLUCOSE BLD GHB EST-MCNC: 157 MG/DL
FASTING STATUS PATIENT QL REPORTED: YES
FASTING STATUS PATIENT QL REPORTED: YES
GLUCOSE SERPL-MCNC: 265 MG/DL (ref 70–99)
HBA1C MFR BLD: 7.1 %
HCO3 SERPL-SCNC: 28 MMOL/L (ref 22–29)
HCV AB SERPL QL IA: NONREACTIVE
HDLC SERPL-MCNC: 58 MG/DL
HIV 1+2 AB+HIV1 P24 AG SERPL QL IA: NONREACTIVE
LDLC SERPL CALC-MCNC: 88 MG/DL
MICROALBUMIN UR-MCNC: 14.9 MG/L
MICROALBUMIN/CREAT UR: 12.84 MG/G CR (ref 0–17)
NONHDLC SERPL-MCNC: 109 MG/DL
POTASSIUM SERPL-SCNC: 4.3 MMOL/L (ref 3.4–5.3)
SODIUM SERPL-SCNC: 137 MMOL/L (ref 135–145)
TRIGL SERPL-MCNC: 104 MG/DL

## 2025-02-18 PROCEDURE — 36415 COLL VENOUS BLD VENIPUNCTURE: CPT | Performed by: FAMILY MEDICINE

## 2025-02-18 PROCEDURE — 80061 LIPID PANEL: CPT | Performed by: FAMILY MEDICINE

## 2025-02-18 PROCEDURE — 82043 UR ALBUMIN QUANTITATIVE: CPT | Performed by: FAMILY MEDICINE

## 2025-02-18 PROCEDURE — 87389 HIV-1 AG W/HIV-1&-2 AB AG IA: CPT | Performed by: FAMILY MEDICINE

## 2025-02-18 PROCEDURE — 3074F SYST BP LT 130 MM HG: CPT | Performed by: FAMILY MEDICINE

## 2025-02-18 PROCEDURE — G0238 OTH RESP PROC, INDIV: HCPCS

## 2025-02-18 PROCEDURE — 83036 HEMOGLOBIN GLYCOSYLATED A1C: CPT | Performed by: FAMILY MEDICINE

## 2025-02-18 PROCEDURE — 82570 ASSAY OF URINE CREATININE: CPT | Performed by: FAMILY MEDICINE

## 2025-02-18 PROCEDURE — 86803 HEPATITIS C AB TEST: CPT | Performed by: FAMILY MEDICINE

## 2025-02-18 PROCEDURE — 80048 BASIC METABOLIC PNL TOTAL CA: CPT | Performed by: FAMILY MEDICINE

## 2025-02-18 PROCEDURE — 99214 OFFICE O/P EST MOD 30 MIN: CPT | Mod: 25 | Performed by: FAMILY MEDICINE

## 2025-02-18 PROCEDURE — 3079F DIAST BP 80-89 MM HG: CPT | Performed by: FAMILY MEDICINE

## 2025-02-18 RX ORDER — ESCITALOPRAM OXALATE 20 MG/1
20 TABLET ORAL DAILY
Qty: 90 TABLET | Refills: 2 | Status: SHIPPED | OUTPATIENT
Start: 2025-02-18

## 2025-02-18 RX ORDER — INSULIN LISPRO 100 [IU]/ML
INJECTION, SOLUTION INTRAVENOUS; SUBCUTANEOUS
Qty: 140 ML | Refills: 3 | Status: SHIPPED | OUTPATIENT
Start: 2025-02-18

## 2025-02-18 RX ORDER — ATORVASTATIN CALCIUM 20 MG/1
20 TABLET, FILM COATED ORAL DAILY
Qty: 90 TABLET | Refills: 2 | Status: SHIPPED | OUTPATIENT
Start: 2025-02-18

## 2025-02-18 RX ORDER — GABAPENTIN 300 MG/1
CAPSULE ORAL
Qty: 180 CAPSULE | Refills: 2 | Status: SHIPPED | OUTPATIENT
Start: 2025-02-18

## 2025-02-18 RX ORDER — ESOMEPRAZOLE MAGNESIUM 40 MG/1
40 CAPSULE, DELAYED RELEASE ORAL
Qty: 90 CAPSULE | Refills: 2 | Status: SHIPPED | OUTPATIENT
Start: 2025-02-18

## 2025-02-18 RX ORDER — LISINOPRIL 5 MG/1
5 TABLET ORAL DAILY
Qty: 90 TABLET | Refills: 2 | Status: SHIPPED | OUTPATIENT
Start: 2025-02-18

## 2025-02-18 RX ORDER — PROCHLORPERAZINE 25 MG/1
SUPPOSITORY RECTAL
Qty: 9 EACH | Refills: 4 | Status: SHIPPED | OUTPATIENT
Start: 2025-02-18

## 2025-02-18 RX ORDER — PROCHLORPERAZINE 25 MG/1
SUPPOSITORY RECTAL
Qty: 1 EACH | Refills: 3 | Status: SHIPPED | OUTPATIENT
Start: 2025-02-18

## 2025-03-01 DIAGNOSIS — R20.8 BURNING SENSATION OF FEET: ICD-10-CM

## 2025-03-03 RX ORDER — GABAPENTIN 300 MG/1
CAPSULE ORAL
Refills: 0 | OUTPATIENT
Start: 2025-03-03

## 2025-03-10 ENCOUNTER — MYC REFILL (OUTPATIENT)
Dept: FAMILY MEDICINE | Facility: OTHER | Age: 50
End: 2025-03-10

## 2025-03-10 DIAGNOSIS — D86.0 PULMONARY SARCOIDOSIS: ICD-10-CM

## 2025-03-10 DIAGNOSIS — K21.9 LPRD (LARYNGOPHARYNGEAL REFLUX DISEASE): ICD-10-CM

## 2025-03-10 DIAGNOSIS — R09.A2 GLOBUS PHARYNGEUS: ICD-10-CM

## 2025-03-11 ENCOUNTER — HOSPITAL ENCOUNTER (OUTPATIENT)
Dept: CARDIAC REHAB | Facility: HOSPITAL | Age: 50
Discharge: HOME | End: 2025-03-11
Attending: FAMILY MEDICINE
Payer: COMMERCIAL

## 2025-03-11 PROCEDURE — G0238 OTH RESP PROC, INDIV: HCPCS

## 2025-03-11 RX ORDER — ESOMEPRAZOLE MAGNESIUM 40 MG/1
40 CAPSULE, DELAYED RELEASE ORAL
Qty: 90 CAPSULE | Refills: 2 | OUTPATIENT
Start: 2025-03-11

## 2025-03-13 ENCOUNTER — MEDICAL CORRESPONDENCE (OUTPATIENT)
Dept: HEALTH INFORMATION MANAGEMENT | Facility: HOSPITAL | Age: 50
End: 2025-03-13

## 2025-03-13 ENCOUNTER — HOSPITAL ENCOUNTER (OUTPATIENT)
Dept: CARDIAC REHAB | Facility: HOSPITAL | Age: 50
Discharge: HOME | End: 2025-03-13
Attending: FAMILY MEDICINE
Payer: COMMERCIAL

## 2025-03-13 PROCEDURE — G0238 OTH RESP PROC, INDIV: HCPCS

## 2025-03-18 ENCOUNTER — ALLIED HEALTH/NURSE VISIT (OUTPATIENT)
Dept: FAMILY MEDICINE | Facility: OTHER | Age: 50
End: 2025-03-18
Payer: COMMERCIAL

## 2025-03-18 DIAGNOSIS — D86.0 PULMONARY SARCOIDOSIS: Primary | ICD-10-CM

## 2025-03-20 LAB
ATRIAL RATE - MUSE: 77 BPM
DIASTOLIC BLOOD PRESSURE - MUSE: NORMAL MMHG
INTERPRETATION ECG - MUSE: NORMAL
P AXIS - MUSE: 50 DEGREES
PR INTERVAL - MUSE: 152 MS
QRS DURATION - MUSE: 80 MS
QT - MUSE: 376 MS
QTC - MUSE: 425 MS
R AXIS - MUSE: 49 DEGREES
SYSTOLIC BLOOD PRESSURE - MUSE: NORMAL MMHG
T AXIS - MUSE: 31 DEGREES
VENTRICULAR RATE- MUSE: 77 BPM

## 2025-03-26 NOTE — PROGRESS NOTES
{PROVIDER CHARTING PREFERENCE:988169}    Subjective   Gabriel is a 49 year old, presenting for the following health issues:  No chief complaint on file.        3/27/2025     9:36 AM   Additional Questions   Roomed by Celestina Patel   Accompanied by self         3/27/2025     9:36 AM   Patient Reported Additional Medications   Patient reports taking the following new medications none     History of Present Illness       Reason for visit:  Hernia   He is taking medications regularly.        Getting laid off, wants to get umbilical hernia fixed, also starting to bother him with lifting/etc, a little red at times.    Pulm stopped his Dulera and replaced with Trelegy  Echo pending  Albuterol 15 min before exercise  Sees back in 8 months    Interested in weight loss drugs - says gained weight while on prednisone.      *** econsult endo about starting wegovy for wt loss in DM1            Concern - hernia  Onset: couple years   Description: starting to feel some pain   Intensity: mild  Progression of Symptoms:  worsening  Accompanying Signs & Symptoms: noticing more pain/ discomfort  Previous history of similar problem: no  Precipitating factors:        Worsened by: exertion/ lifting   Alleviating factors:        Improved by: relaxing   Therapies tried and outcome: None    Would like to discuss weight loss medication     {ROS Picklists (Optional):186048}      Objective    There were no vitals taken for this visit.  There is no height or weight on file to calculate BMI.  Physical Exam   {Exam List (Optional):827604}    {Diagnostic Test Results (Optional):340560}        Signed Electronically by: Kota Echeverria DO  {Email feedback regarding this note to [email or web address]   :705787}

## 2025-03-27 ENCOUNTER — HOSPITAL ENCOUNTER (OUTPATIENT)
Dept: CARDIAC REHAB | Facility: HOSPITAL | Age: 50
Discharge: HOME | End: 2025-03-27
Attending: FAMILY MEDICINE
Payer: COMMERCIAL

## 2025-03-27 ENCOUNTER — HOSPITAL ENCOUNTER (OUTPATIENT)
Dept: CARDIOLOGY | Facility: HOSPITAL | Age: 50
Discharge: HOME | End: 2025-03-27
Attending: INTERNAL MEDICINE
Payer: COMMERCIAL

## 2025-03-27 ENCOUNTER — OFFICE VISIT (OUTPATIENT)
Dept: FAMILY MEDICINE | Facility: OTHER | Age: 50
End: 2025-03-27
Attending: FAMILY MEDICINE
Payer: COMMERCIAL

## 2025-03-27 VITALS
HEART RATE: 91 BPM | WEIGHT: 254 LBS | TEMPERATURE: 97.4 F | DIASTOLIC BLOOD PRESSURE: 80 MMHG | SYSTOLIC BLOOD PRESSURE: 128 MMHG | RESPIRATION RATE: 17 BRPM | OXYGEN SATURATION: 95 % | HEIGHT: 69 IN | BODY MASS INDEX: 37.62 KG/M2

## 2025-03-27 DIAGNOSIS — D86.0 PULMONARY SARCOIDOSIS: ICD-10-CM

## 2025-03-27 DIAGNOSIS — R09.A2 GLOBUS PHARYNGEUS: ICD-10-CM

## 2025-03-27 DIAGNOSIS — D86.9 SARCOIDOSIS: ICD-10-CM

## 2025-03-27 DIAGNOSIS — K21.9 LPRD (LARYNGOPHARYNGEAL REFLUX DISEASE): ICD-10-CM

## 2025-03-27 DIAGNOSIS — K42.9 UMBILICAL HERNIA WITHOUT OBSTRUCTION AND WITHOUT GANGRENE: Primary | ICD-10-CM

## 2025-03-27 LAB — LVEF ECHO: NORMAL

## 2025-03-27 PROCEDURE — G0238 OTH RESP PROC, INDIV: HCPCS

## 2025-03-27 PROCEDURE — 93306 TTE W/DOPPLER COMPLETE: CPT

## 2025-03-27 RX ORDER — ESOMEPRAZOLE MAGNESIUM 40 MG/1
40 CAPSULE, DELAYED RELEASE ORAL
Qty: 90 CAPSULE | Refills: 2 | Status: SHIPPED | OUTPATIENT
Start: 2025-03-27 | End: 2025-03-31

## 2025-03-27 RX ORDER — FLUTICASONE FUROATE, UMECLIDINIUM BROMIDE AND VILANTEROL TRIFENATATE 200; 62.5; 25 UG/1; UG/1; UG/1
1 POWDER RESPIRATORY (INHALATION) DAILY
COMMUNITY
Start: 2025-03-13

## 2025-03-27 ASSESSMENT — ASTHMA QUESTIONNAIRES
QUESTION_5 LAST FOUR WEEKS HOW WOULD YOU RATE YOUR ASTHMA CONTROL: COMPLETELY CONTROLLED
QUESTION_2 LAST FOUR WEEKS HOW OFTEN HAVE YOU HAD SHORTNESS OF BREATH: ONCE OR TWICE A WEEK
QUESTION_3 LAST FOUR WEEKS HOW OFTEN DID YOUR ASTHMA SYMPTOMS (WHEEZING, COUGHING, SHORTNESS OF BREATH, CHEST TIGHTNESS OR PAIN) WAKE YOU UP AT NIGHT OR EARLIER THAN USUAL IN THE MORNING: NOT AT ALL
QUESTION_1 LAST FOUR WEEKS HOW MUCH OF THE TIME DID YOUR ASTHMA KEEP YOU FROM GETTING AS MUCH DONE AT WORK, SCHOOL OR AT HOME: NONE OF THE TIME
QUESTION_4 LAST FOUR WEEKS HOW OFTEN HAVE YOU USED YOUR RESCUE INHALER OR NEBULIZER MEDICATION (SUCH AS ALBUTEROL): ONCE A WEEK OR LESS
ACT_TOTALSCORE: 23

## 2025-03-27 ASSESSMENT — PAIN SCALES - GENERAL: PAINLEVEL_OUTOF10: NO PAIN (0)

## 2025-03-28 DIAGNOSIS — K21.9 LPRD (LARYNGOPHARYNGEAL REFLUX DISEASE): ICD-10-CM

## 2025-03-28 DIAGNOSIS — D86.0 PULMONARY SARCOIDOSIS: ICD-10-CM

## 2025-03-28 DIAGNOSIS — R09.A2 GLOBUS PHARYNGEUS: ICD-10-CM

## 2025-03-31 ENCOUNTER — MYC REFILL (OUTPATIENT)
Dept: FAMILY MEDICINE | Facility: OTHER | Age: 50
End: 2025-03-31

## 2025-03-31 DIAGNOSIS — E10.9 TYPE 1 DIABETES MELLITUS WITHOUT COMPLICATION (H): ICD-10-CM

## 2025-03-31 RX ORDER — PROCHLORPERAZINE 25 MG/1
SUPPOSITORY RECTAL
Qty: 9 EACH | Refills: 4 | Status: SHIPPED | OUTPATIENT
Start: 2025-03-31

## 2025-03-31 RX ORDER — ESOMEPRAZOLE MAGNESIUM 40 MG/1
CAPSULE, DELAYED RELEASE ORAL
Qty: 90 CAPSULE | Refills: 2 | Status: SHIPPED | OUTPATIENT
Start: 2025-03-31

## 2025-03-31 NOTE — PROGRESS NOTES
Nexium  Last signed 3/27 #90, 2 R [facility] and now pended for [facility].   Last office visit 3/27  Leesa Mcbride, TRACI  Care Coordination

## 2025-03-31 NOTE — TELEPHONE ENCOUNTER
Nexium      Last Written Prescription Date:  3/27/25  Last Fill Quantity: 90,   # refills: 2  Last Office Visit: 3/27/25  Future Office visit:       Routing refill request to provider for review/approval because:

## 2025-03-31 NOTE — TELEPHONE ENCOUNTER
Dexcom G6      Last Written Prescription Date:  2/18/25  Last Fill Quantity: 9,   # refills: 4  Last Office Visit: 3/27/25  Future Office visit:       Routing refill request to provider for review/approval because:

## 2025-04-01 ENCOUNTER — OFFICE VISIT (OUTPATIENT)
Dept: SURGERY | Facility: OTHER | Age: 50
End: 2025-04-01
Attending: FAMILY MEDICINE
Payer: COMMERCIAL

## 2025-04-01 VITALS
SYSTOLIC BLOOD PRESSURE: 130 MMHG | OXYGEN SATURATION: 95 % | HEART RATE: 87 BPM | RESPIRATION RATE: 16 BRPM | DIASTOLIC BLOOD PRESSURE: 68 MMHG

## 2025-04-01 DIAGNOSIS — K42.9 UMBILICAL HERNIA WITHOUT OBSTRUCTION AND WITHOUT GANGRENE: Primary | ICD-10-CM

## 2025-04-01 ASSESSMENT — PAIN SCALES - GENERAL: PAINLEVEL_OUTOF10: NO PAIN (0)

## 2025-04-01 NOTE — PROGRESS NOTES
CLINIC NOTE - CONSULT  4/1/2025    Patient:Yaya Mg  Referring Physician:  Dr. Echeverria    Reason for Referral: Umbilical hernia    HPI:  Yaya Mg is a very pleasant 49 year old male BMI 37, T1DM on insulin pump (HgbA1C 7%), pulmonary sarcoidosis s/p steroid treatment currently treated with inhalers referred to [facility] for evaluation of umbilical hernia.    Patient has had his hernia for years.  It is bothersome to him and he would like it repaired.  However, it is not life limiting and really does not cause him pain.  It is partially reducible.  Surgical history includes appendectomy.  PFTs were in the low normal range in 2024.  Echocardiogram in 2025 demonstrated EG 55-60%.  Patient denies chest pain on exertion.  Patient works for [employer] and would like to have his hernia fixed prior to any issues with insurance.  He is also working on weight loss with Dr. Echeverria and they are looking into starting GLP 1 agonists.    Past Medical History:  Past Medical History:   Diagnosis Date    Anxiety     Depressive disorder     Hypertension     Type 1 diabetes (H)        Past Surgical History:  Past Surgical History:   Procedure Laterality Date    APPENDECTOMY  01/1985    BACK SURGERY  2010    lumbar calcium deposits removed    BIOPSY  2023    Lung sarcoidosis    CATARACT EXTRACTION Right 2021    COLONOSCOPY N/A 02/02/2023    Procedure: COLONOSCOPY with polypectomy;  Surgeon: Gildardo Nichols MD;  Location: [facility]    ENDOSCOPY UPPER, COLONOSCOPY, COMBINED N/A 12/19/2024    Procedure: Upper Endoscopy with Biopsies, colonoscopy with polypectomy;  Surgeon: Gildardo Nichols MD;  Location: [facility]    EYE SURGERY  2022    Catarac lens replacement    IMPLANT PROSTHESIS PENIS INFLATABLE N/A 01/03/2023    Procedure: INSERTION of INFLATABLE PENILE PROSTHESIS;  Surgeon: Amandeep Hill MD;  Location: [facility]       Family History History:  Family History   Problem Relation Age of Onset     Hypertension Father     Hyperlipidemia Father     Obesity Father     Diabetes Father     Obesity Brother     Hypertension Brother     Depression Brother     Colon Cancer Paternal Grandmother        History of Tobacco Use:  History   Smoking Status    Never   Smokeless Tobacco    Never       Current Medications:  Current Outpatient Medications   Medication Sig Dispense Refill    atorvastatin (LIPITOR) 20 MG tablet Take 1 tablet (20 mg) by mouth daily. 90 tablet 2    Continuous Blood Gluc  (DEXCOM G6 ) JUSTEN       Continuous Glucose Sensor (DEXCOM G6 SENSOR) MISC Change every 10 days. 9 each 4    Continuous Glucose Transmitter (DEXCOM G6 TRANSMITTER) MISC Change every 3 months. 1 each 3    escitalopram (LEXAPRO) 20 MG tablet Take 1 tablet (20 mg) by mouth daily. 90 tablet 2    esomeprazole (NEXIUM) 40 MG DR capsule Take 1 capsule (40 mg) by mouth every morning (before breakfast). - Oral 90 capsule 2    gabapentin (NEURONTIN) 300 MG capsule TAKE 1 TO 2 CAPSULES AT NIGHT AS NEEDED 180 capsule 2    Insulin Infusion Pump (T: SLIM X2 INS /CONTROL 7.4) JUSTEN       Insulin Infusion Pump Supplies (AUTOSOFT 90 INFUSION SET) MISC USE 1 INFUSION SET EVERY 48 HOURS 40 each 3    Insulin Infusion Pump Supplies (T:SLIM X2 3ML CARTRIDGE) MISC USE 1 CARTRIDGE EVERY 48 HOURS 40 each 3    insulin lispro (HUMALOG) 100 UNIT/ML vial USE WITH INSULIN PUMP, TOTAL DAILY DOSE  UNITS 140 mL 3    lisinopril (ZESTRIL) 5 MG tablet Take 1 tablet (5 mg) by mouth daily. 90 tablet 2    multivitamin w/minerals (THERA-VIT-M) tablet Take 1 tablet by mouth daily.      TRELEGY ELLIPTA 200-62.5-25 MCG/ACT oral inhaler Inhale 1 puff into the lungs daily.         Allergies:  No Known Allergies    ROS:  Pertinent items are noted in HPI.    PHYSICAL EXAM:     Vital signs: /68 (BP Location: Right arm, Cuff Size: Adult Large)   Pulse 87   Resp 16   SpO2 95%    Weight: [unfilled]   BMI: There is no height or weight on file to calculate  BMI.   General: No apparent distress   Skin: No rashes or readily discernible lesions   Neck: Neck supple, symmetric and without palpable adenopathy or masses   Lungs: Nonlabored breathing on room air   CV: Regular rate and rythm   Abdominal: Soft, nondistended, nontender.  There is a partially reducible umbilical hernia   Extremities: No peripheral edema    Labs:  Reviewed    Imaging:  CT scan 2024 demonstrates a 3 cm fascial defect at the umbilicus with a fat containing umbilical hernia    ASSESSMENT & Plan:  Yaya Mg is a very pleasant 49 year old male BMI 37, T1DM on insulin pump (HgbA1C 7%), pulmonary sarcoidosis s/p steroid treatment currently treated with inhalers referred [facility] for evaluation of umbilical hernia.    Discussed at length indication for umbilical hernia repair and it's potential complications which he is at greater risk for given his obesity and diabetes.    Patient would like to have his hernia fixed, however, his symptoms are not life limiting.  He is working on weight loss with Dr. Echeverria.  I would like to have patient down to a BMI less than 35 and patient could get to that target with a 20 pound weight loss.    Will see patient back in one month to reevaluate weight loss.  Patient is not too concerned about insurance as he will have this for about 6 months in case of any lay offs.

## 2025-04-04 ENCOUNTER — TRANSFERRED RECORDS (OUTPATIENT)
Dept: HEALTH INFORMATION MANAGEMENT | Facility: CLINIC | Age: 50
End: 2025-04-04

## 2025-04-09 ENCOUNTER — E-CONSULT (OUTPATIENT)
Dept: ENDOCRINOLOGY | Facility: CLINIC | Age: 50
End: 2025-04-09
Payer: COMMERCIAL

## 2025-04-09 PROCEDURE — 99451 NTRPROF PH1/NTRNET/EHR 5/>: CPT

## 2025-04-10 NOTE — PROGRESS NOTES
4/9/2025     E-Consult has been accepted.    Interprofessional consultation requested by:  Kota Echeverria DO      Clinical Question/Purpose: MY CLINICAL QUESTION IS:  Type 1 Diabetes.  Patient interested in Wegovy/Zepbound for weight loss.  Is this a viable option, and if yes, any special monitoring/cautions?    Patient assessment and information reviewed:   Past Medical History:   Diagnosis Date    Anxiety     Depressive disorder     Diabetic gastroparesis (H) 05/2024    secondary to DKA    DKA (diabetic ketoacidosis) (H) 05/2024    Dyslipidemia     ED (erectile dysfunction)     Hypertension     Obesity     Sarcoidosis     Tubular adenoma of colon 2023    Type 1 diabetes (H) 2001 2/18/25 [place] 12.84, creatinine 0.83, HgbA1c 7.1%    Current Outpatient Medications   Medication Sig Dispense Refill    atorvastatin (LIPITOR) 20 MG tablet Take 1 tablet (20 mg) by mouth daily. 90 tablet 2    Continuous Blood Gluc  (DEXCOM G6 ) JUSTEN       Continuous Glucose Sensor (DEXCOM G6 SENSOR) MISC Change every 10 days. 9 each 4    Continuous Glucose Transmitter (DEXCOM G6 TRANSMITTER) MISC Change every 3 months. 1 each 3    escitalopram (LEXAPRO) 20 MG tablet Take 1 tablet (20 mg) by mouth daily. 90 tablet 2    esomeprazole (NEXIUM) 40 MG DR capsule Take 1 capsule (40 mg) by mouth every morning (before breakfast). - Oral 90 capsule 2    gabapentin (NEURONTIN) 300 MG capsule TAKE 1 TO 2 CAPSULES AT NIGHT AS NEEDED 180 capsule 2    Insulin Infusion Pump (T: SLIM X2 INS /CONTROL 7.4) JUSTEN       Insulin Infusion Pump Supplies (AUTOSOFT 90 INFUSION SET) MISC USE 1 INFUSION SET EVERY 48 HOURS 40 each 3    Insulin Infusion Pump Supplies (T:SLIM X2 3ML CARTRIDGE) MISC USE 1 CARTRIDGE EVERY 48 HOURS 40 each 3    insulin lispro (HUMALOG) 100 UNIT/ML vial USE WITH INSULIN PUMP, TOTAL DAILY DOSE  UNITS 140 mL 3    lisinopril (ZESTRIL) 5 MG tablet Take 1 tablet (5 mg) by mouth daily. 90 tablet 2    multivitamin  w/minerals (THERA-VIT-M) tablet Take 1 tablet by mouth daily.      TRELEGY ELLIPTA 200-62.5-25 MCG/ACT oral inhaler Inhale 1 puff into the lungs daily.       Type 1 DM on insulin pump Tandem T slim with Dexcom G6?   BMI 37.51     Recommendations:   GLP1 RA would be an off label use in diabetes type 1.  There is a small body of literature on this use in type 1 diabetes for weight loss, but the risks include hypoglycemia and ketosis.    I am attaching link to an article on practical considerations related to this:   https://diabetesjournals.org/clinical/article-abstract/43/1/131/[email or web address]/Insulin-Titration-Recommendations-When-Using?redirectedFrom=fulltext  Of concern for this patient would be if he has gastroparesis now.   It appears he was last seen by diabetes specialist during the 2023 hospitalization .  Suggest you schedule him to see diabetes education or diabetes specialty [facility] pharmacy  to also discuss this with them before trying it.     The recommendations provided in this E-Consult are based on a review of clinical data pertinent to the clinical question presented, without a review of the patient's complete medical record or, the benefit of a comprehensive in-person or virtual patient evaluation. This consultation should not replace the clinical judgement and evaluation of the provider ordering this E-Consult. Any new clinical issues, or changes in patient status since the filing of this E-Consult will need to be taken into account when assessing these recommendations. Please contact me if you have further questions.    My total time spent reviewing clinical information and formulating assessment was 10 minutes.        Talya Garg MD

## 2025-04-21 ENCOUNTER — MYC MEDICAL ADVICE (OUTPATIENT)
Dept: FAMILY MEDICINE | Facility: OTHER | Age: 50
End: 2025-04-21

## 2025-04-21 NOTE — PROGRESS NOTES
This has been sent previously in [employer] message to PCP.  Will respond in that message and route again to PCP to review and sign if appropriate.  Leesa Mcbride, RN  Care Coordination

## 2025-05-06 ENCOUNTER — OFFICE VISIT (OUTPATIENT)
Dept: SURGERY | Facility: OTHER | Age: 50
End: 2025-05-06
Attending: SURGERY
Payer: COMMERCIAL

## 2025-05-06 VITALS
BODY MASS INDEX: 36.86 KG/M2 | OXYGEN SATURATION: 99 % | TEMPERATURE: 98.4 F | DIASTOLIC BLOOD PRESSURE: 72 MMHG | SYSTOLIC BLOOD PRESSURE: 134 MMHG | WEIGHT: 249.6 LBS | HEART RATE: 80 BPM | RESPIRATION RATE: 16 BRPM

## 2025-05-06 DIAGNOSIS — K42.9 UMBILICAL HERNIA WITHOUT OBSTRUCTION AND WITHOUT GANGRENE: Primary | ICD-10-CM

## 2025-05-06 ASSESSMENT — PAIN SCALES - GENERAL: PAINLEVEL_OUTOF10: NO PAIN (0)

## 2025-05-06 NOTE — PROGRESS NOTES
A&P:  Yaya Mg is a very pleasant 49 year old male BMI 37, T1DM on insulin pump (HgbA1C 7%), pulmonary sarcoidosis s/p steroid treatment currently treated with inhalers referred [facility] for evaluation of umbilical hernia and weight loss.    Patient hasn't had much for weight loss at this point, however, he is just getting back to working out and started phentermine.  Patient is interested in surgery and understands that his outcome will be improved with about a 20 pound weight loss.  He is motivated to accomplish this.  He is currently laid off from the [employer] and has insurance for an additional 6 months.  Will see back in two months to see progress of weight loss and reevaluate hernia repair.    S:  Feels well.  Umbilical hernia is mildly bothersome when working out but it is not prohibitive.    O:  Umbilical hernia remains stable.  Soft and partially reducible.  No overlying skin changes.

## 2025-05-19 NOTE — PROGRESS NOTES
"  {PROVIDER CHARTING PREFERENCE:854083}    Subjective   Gabriel is a 50 year old, presenting for the following health issues:  Recheck Medication        5/21/2025     9:47 AM   Additional Questions   Roomed by torey COLLINS      Down 6*** lbs  Back in gym  Got rid of \"shitty foods\" out of house  Had to increase his insulin - but has under control    - home bp monitoring  Increase lisinopril to 10mg  Back 2 months, sooner if needed    Medication Followup of Phentermine  Taking Medication as prescribed: yes  Side Effects:  insomnia, high blood sugars  Medication Helping Symptoms:  yes  {additonal problems for provider to add (Optional):023125}    {ROS Picklists (Optional):872241}      Objective    BP (!) 140/93 (BP Location: Left arm, Patient Position: Sitting, Cuff Size: Adult Large)   Pulse 81   Temp (!) 96.3  F (35.7  C) (Tympanic)   Resp 16   Ht 1.753 m (5' 9\")   Wt 112.7 kg (248 lb 6.4 oz)   SpO2 97%   BMI 36.68 kg/m    Body mass index is 36.68 kg/m .  Physical Exam   {Exam List (Optional):157533}    {Diagnostic Test Results (Optional):810435}        Signed Electronically by: Kota Echeverria DO  {Email feedback regarding this note to [email or web address]   :161429}  " "sugars  Medication Helping Symptoms:  yes            Objective    BP (!) 140/93 (BP Location: Left arm, Patient Position: Sitting, Cuff Size: Adult Large)   Pulse 81   Temp (!) 96.3  F (35.7  C) (Tympanic)   Resp 16   Ht 1.753 m (5' 9\")   Wt 112.7 kg (248 lb 6.4 oz)   SpO2 97%   BMI 36.68 kg/m    Body mass index is 36.68 kg/m .  Physical Exam  Constitutional:       General: He is not in acute distress.     Appearance: Normal appearance.   Cardiovascular:      Rate and Rhythm: Normal rate and regular rhythm.      Heart sounds: Normal heart sounds. No murmur heard.  Pulmonary:      Effort: Pulmonary effort is normal.      Breath sounds: Normal breath sounds.   Neurological:      Mental Status: He is alert and oriented to person, place, and time.                    Signed Electronically by: Kota Echeverria DO    "

## 2025-05-21 ENCOUNTER — OFFICE VISIT (OUTPATIENT)
Dept: FAMILY MEDICINE | Facility: OTHER | Age: 50
End: 2025-05-21
Attending: FAMILY MEDICINE
Payer: COMMERCIAL

## 2025-05-21 VITALS
TEMPERATURE: 96.3 F | DIASTOLIC BLOOD PRESSURE: 93 MMHG | OXYGEN SATURATION: 97 % | WEIGHT: 248.4 LBS | SYSTOLIC BLOOD PRESSURE: 140 MMHG | HEIGHT: 69 IN | HEART RATE: 81 BPM | RESPIRATION RATE: 16 BRPM | BODY MASS INDEX: 36.79 KG/M2

## 2025-05-21 DIAGNOSIS — R03.0 ELEVATED BLOOD PRESSURE READING WITHOUT DIAGNOSIS OF HYPERTENSION: Primary | ICD-10-CM

## 2025-05-21 DIAGNOSIS — E10.9 TYPE 1 DIABETES MELLITUS WITHOUT COMPLICATION (H): ICD-10-CM

## 2025-05-21 DIAGNOSIS — E66.01 MORBID OBESITY (H): ICD-10-CM

## 2025-05-21 RX ORDER — PHENTERMINE HYDROCHLORIDE 15 MG/1
15 CAPSULE ORAL EVERY MORNING
Qty: 30 CAPSULE | Refills: 1 | Status: SHIPPED | OUTPATIENT
Start: 2025-05-21

## 2025-05-21 RX ORDER — LISINOPRIL 10 MG/1
10 TABLET ORAL DAILY
Qty: 90 TABLET | Refills: 0 | Status: SHIPPED | OUTPATIENT
Start: 2025-05-21

## 2025-06-06 DIAGNOSIS — E10.9 TYPE 1 DIABETES MELLITUS WITHOUT COMPLICATION (H): ICD-10-CM

## 2025-06-09 NOTE — PROGRESS NOTES
Lisinopril  Last signed 5/21 #90, 0 R  Last office visit 5/21 with note  Elevated blood pressure reading without diagnosis of hypertension  Baseline on lisinopril 5mg for his diabetes.  BP going up today with phentermine.  Prefer to avoid increasing/adding meds to treat med side effects (BP going up on phentermine) - but we agree that short term we'll try increasing his lisinopril as his weight is going down.     Follow-up 2 months, sooner if BP not improved with him monitoring  Leesa Mcbride RN  Care Coordination    Protocol failed for:  Most recent blood pressure under 140/90 in past 12 months- Clinicial or Patient Reported        BP Readings from Last 3 Encounters:   05/21/25 (!) 140/93   05/06/25 134/72   04/01/25 130/68      No data recorded       Medication is active on med list and the sig matches. RN to manually verify dose and sig if red X/fail.

## 2025-06-10 RX ORDER — LISINOPRIL 10 MG/1
10 TABLET ORAL DAILY
Qty: 90 TABLET | Refills: 0 | Status: SHIPPED | OUTPATIENT
Start: 2025-06-10

## 2025-07-15 RX ORDER — INHALER,ASSIST DEV,SMALL MASK
SPACER (EA) MISCELLANEOUS
COMMUNITY
Start: 2024-12-06

## 2025-07-15 RX ORDER — ALBUTEROL SULFATE 90 UG/1
1 INHALANT RESPIRATORY (INHALATION) EVERY 6 HOURS PRN
COMMUNITY
Start: 2025-04-15

## 2025-07-21 ENCOUNTER — PREP FOR PROCEDURE (OUTPATIENT)
Dept: SURGERY | Facility: OTHER | Age: 50
End: 2025-07-21

## 2025-07-21 ENCOUNTER — OFFICE VISIT (OUTPATIENT)
Dept: SURGERY | Facility: OTHER | Age: 50
End: 2025-07-21
Attending: SURGERY
Payer: COMMERCIAL

## 2025-07-21 VITALS
SYSTOLIC BLOOD PRESSURE: 112 MMHG | BODY MASS INDEX: 35.69 KG/M2 | OXYGEN SATURATION: 95 % | TEMPERATURE: 96.9 F | WEIGHT: 241.7 LBS | HEART RATE: 82 BPM | DIASTOLIC BLOOD PRESSURE: 80 MMHG

## 2025-07-21 DIAGNOSIS — K42.9 UMBILICAL HERNIA WITHOUT OBSTRUCTION AND WITHOUT GANGRENE: Primary | ICD-10-CM

## 2025-07-21 DIAGNOSIS — K42.0 UMBILICAL HERNIA WITH OBSTRUCTION: Primary | ICD-10-CM

## 2025-07-21 DIAGNOSIS — K42.9 UMBILICAL HERNIA WITHOUT OBSTRUCTION AND WITHOUT GANGRENE: ICD-10-CM

## 2025-07-21 ASSESSMENT — PAIN SCALES - GENERAL: PAINLEVEL_OUTOF10: NO PAIN (0)

## 2025-07-21 NOTE — PROGRESS NOTES
Procedure: open umbilical hernia repair with mesh  DX: umbilical hernia  Go: 7/31  Pat: 7/25  Preop: yes  Note: note sent to make appt tomorrow with  PCP a preop

## 2025-07-21 NOTE — PROGRESS NOTES
A&P:  50 year old male PMH type 1 diabetes mellitus and pulmonary sarcoidosis presenting for follow up of symptomatic umbilical hernia.  Doing well and lost 10 pounds.  Actually doing quite well from a comorbidity standpoint.  Will get scheduled for open umbilical hernia repair with mesh.    Discussed risks and benefits of open umbilical hernia repair with mesh including bleeding, infection, damage to small bowel requiring repair and hospital admission.  Patient demonstrated understanding and wishes to proceed with open umbilical hernia repair with mesh.    S:  Feels well.  Lost 10 pounds with diet and exercise.  Hernia will bother him at the gym and during chores around his house.  He would like to have this fixed.    O:  Appears well.    Soft, reducible umbilical hernia present.    A1C in February 7.1%.  Getting this redrawn tomorrow.

## 2025-07-22 ENCOUNTER — OFFICE VISIT (OUTPATIENT)
Dept: FAMILY MEDICINE | Facility: OTHER | Age: 50
End: 2025-07-22
Attending: FAMILY MEDICINE
Payer: COMMERCIAL

## 2025-07-22 VITALS
SYSTOLIC BLOOD PRESSURE: 108 MMHG | OXYGEN SATURATION: 96 % | WEIGHT: 239.9 LBS | TEMPERATURE: 96.3 F | HEART RATE: 87 BPM | RESPIRATION RATE: 18 BRPM | BODY MASS INDEX: 35.43 KG/M2 | DIASTOLIC BLOOD PRESSURE: 80 MMHG

## 2025-07-22 DIAGNOSIS — I10 BENIGN ESSENTIAL HYPERTENSION: ICD-10-CM

## 2025-07-22 DIAGNOSIS — F41.8 DEPRESSION WITH ANXIETY: ICD-10-CM

## 2025-07-22 DIAGNOSIS — G47.33 OSA ON CPAP: ICD-10-CM

## 2025-07-22 DIAGNOSIS — E66.01 MORBID OBESITY (H): ICD-10-CM

## 2025-07-22 DIAGNOSIS — Z01.818 PREOPERATIVE EXAMINATION: Primary | ICD-10-CM

## 2025-07-22 DIAGNOSIS — K42.9 UMBILICAL HERNIA WITHOUT OBSTRUCTION AND WITHOUT GANGRENE: ICD-10-CM

## 2025-07-22 DIAGNOSIS — E10.65 TYPE 1 DIABETES MELLITUS WITH HYPERGLYCEMIA (H): ICD-10-CM

## 2025-07-22 DIAGNOSIS — D86.0 PULMONARY SARCOIDOSIS: ICD-10-CM

## 2025-07-22 LAB
ANION GAP SERPL CALCULATED.3IONS-SCNC: 13 MMOL/L (ref 7–15)
BASOPHILS # BLD AUTO: 0 10E3/UL (ref 0–0.2)
BASOPHILS NFR BLD AUTO: 1 %
BUN SERPL-MCNC: 17.6 MG/DL (ref 6–20)
CALCIUM SERPL-MCNC: 9.5 MG/DL (ref 8.8–10.4)
CHLORIDE SERPL-SCNC: 102 MMOL/L (ref 98–107)
CREAT SERPL-MCNC: 1.07 MG/DL (ref 0.67–1.17)
EGFRCR SERPLBLD CKD-EPI 2021: 85 ML/MIN/1.73M2
EOSINOPHIL # BLD AUTO: 0.2 10E3/UL (ref 0–0.7)
EOSINOPHIL NFR BLD AUTO: 5 %
ERYTHROCYTE [DISTWIDTH] IN BLOOD BY AUTOMATED COUNT: 12.3 % (ref 10–15)
EST. AVERAGE GLUCOSE BLD GHB EST-MCNC: 120 MG/DL
GLUCOSE SERPL-MCNC: 147 MG/DL (ref 70–99)
HBA1C MFR BLD: 5.8 %
HCO3 SERPL-SCNC: 23 MMOL/L (ref 22–29)
HCT VFR BLD AUTO: 44.1 % (ref 40–53)
HGB BLD-MCNC: 16 G/DL (ref 13.3–17.7)
IMM GRANULOCYTES # BLD: 0 10E3/UL
IMM GRANULOCYTES NFR BLD: 0 %
LYMPHOCYTES # BLD AUTO: 1.3 10E3/UL (ref 0.8–5.3)
LYMPHOCYTES NFR BLD AUTO: 25 %
MCH RBC QN AUTO: 30.9 PG (ref 26.5–33)
MCHC RBC AUTO-ENTMCNC: 36.3 G/DL (ref 31.5–36.5)
MCV RBC AUTO: 85 FL (ref 78–100)
MONOCYTES # BLD AUTO: 0.7 10E3/UL (ref 0–1.3)
MONOCYTES NFR BLD AUTO: 13 %
NEUTROPHILS # BLD AUTO: 3 10E3/UL (ref 1.6–8.3)
NEUTROPHILS NFR BLD AUTO: 56 %
NRBC # BLD AUTO: 0 10E3/UL
NRBC BLD AUTO-RTO: 0 /100
PLATELET # BLD AUTO: 239 10E3/UL (ref 150–450)
POTASSIUM SERPL-SCNC: 4.1 MMOL/L (ref 3.4–5.3)
RBC # BLD AUTO: 5.17 10E6/UL (ref 4.4–5.9)
SODIUM SERPL-SCNC: 138 MMOL/L (ref 135–145)
WBC # BLD AUTO: 5.3 10E3/UL (ref 4–11)

## 2025-07-22 RX ORDER — ESCITALOPRAM OXALATE 20 MG/1
20 TABLET ORAL DAILY
Qty: 90 TABLET | Refills: 2 | Status: SHIPPED | OUTPATIENT
Start: 2025-07-22

## 2025-07-22 RX ORDER — PHENTERMINE HYDROCHLORIDE 15 MG/1
15 CAPSULE ORAL EVERY MORNING
Qty: 30 CAPSULE | Refills: 2 | Status: SHIPPED | OUTPATIENT
Start: 2025-07-22

## 2025-07-22 ASSESSMENT — PAIN SCALES - GENERAL: PAINLEVEL_OUTOF10: NO PAIN (0)

## 2025-07-22 ASSESSMENT — ENCOUNTER SYMPTOMS
PALPITATIONS: 0
SORE THROAT: 0
FEVER: 0
DIZZINESS: 0
SHORTNESS OF BREATH: 0
HEADACHES: 0
COUGH: 0

## 2025-07-22 NOTE — PROGRESS NOTES
Preoperative Evaluation  [facility] [place]  [address]  [place] [address]  Phone: [phone]  Primary Provider: Kota Echeverria DO  Pre-op Performing Provider: Kota Echeverria DO  Jul 22, 2025 7/22/2025   Surgical Information   What procedure is being done? hernia   Facility or Hospital where procedure/surgery will be performed: [facility]   Who is doing the procedure / surgery? Dr. Chun   Date of surgery / procedure: 7/31/2025   Time of surgery / procedure: unknown   Where do you plan to recover after surgery? at home with family     Fax number for surgical facility: Note does not need to be faxed, will be available electronically in Epic.    Assessment & Plan     The proposed surgical procedure is considered INTERMEDIATE risk.    Preoperative examination  - Basic metabolic panel  - CBC with Platelets & Differential    Benign essential hypertension    Umbilical hernia without obstruction and without gangrene    Type 1 diabetes mellitus with hyperglycemia (H)  - Hemoglobin A1c    Depression with anxiety  - escitalopram (LEXAPRO) 20 MG tablet; Take 1 tablet (20 mg) by mouth daily.    Morbid obesity (H)  - phentermine 15 MG capsule; Take 1 capsule (15 mg) by mouth every morning.  Will continue post-op as long as continues to loose weight and no side effects.  Need to keep a close eye on BP as this did go up a little with the phentermine resulting in increasing his lisinopril.  Shared decision making done regarding risks/benefits of doing this.    RAMAKRISHNA on CPAP    Pulmonary sarcoidosis                - No identified additional risk factors other than previously addressed    Antiplatelet or Anticoagulation Medication Instructions   - We reviewed the medication list and the patient is not on an antiplatelet or anticoagulation medications.    Additional Medication Instructions  Phentermine - hold x7 days before surgery.  Lisinopril - hold AM of surgery - may need to stop sooner if Blood  Pressure low with stopping Phentermine.  Other meds okay to take as usual.      Recommendation  Approval given to proceed with proposed procedure, without further diagnostic evaluation.        Subjective   Gabriel is a 50 year old, presenting for the following:  Pre-Op Exam          7/22/2025     9:14 AM   Additional Questions   Roomed by Itz lpn   Accompanied by self     HPI: prop umbilical hernia          7/22/2025   Pre-Op Questionnaire   Have you ever had a heart attack or stroke? No   Have you ever had surgery on your heart or blood vessels, such as a stent placement, a coronary artery bypass, or surgery on an artery in your head, neck, heart, or legs? No   Do you have chest pain with activity? No   Do you have a history of heart failure? No   Do you currently have a cold, bronchitis or symptoms of other infection? No   Do you have a cough, shortness of breath, or wheezing? No   Do you or anyone in your family have previous history of blood clots? No   Do you or does anyone in your family have a serious bleeding problem such as prolonged bleeding following surgeries or cuts? No   Have you ever had problems with anemia or been told to take iron pills? No   Have you had any abnormal blood loss such as black, tarry or bloody stools? No   Have you ever had a blood transfusion? No   Are you willing to have a blood transfusion if it is medically needed before, during, or after your surgery? Yes   Have you or any of your relatives ever had problems with anesthesia? No   Do you have sleep apnea, excessive snoring or daytime drowsiness? (!) YES   Do you have a CPAP machine? Yes   Do you have any artifical heart valves or other implanted medical devices like a pacemaker, defibrillator, or continuous glucose monitor? No   Do you have artificial joints? No   Are you allergic to latex? No     Advance Care Planning    Discussed advance care planning with patient; however, patient declined at this time.    Preoperative  Review of    reviewed - controlled substances reflected in medication list.      Status of Chronic Conditions:  HTN - home numbers 120-125/75-80 approx.  RAMAKRISHNA on CPAP  Obesity - weight improving on phentermine, we did have to increase his BP meds to compensate for this temporarily.  Depression/Anxiety - controlled Lexapro.  DM1 - on insulin pump  Pulmonary Sarcoidosis - Trelegy daily, albuterol prn (rarely, maybe twice a month)      Patient Active Problem List    Diagnosis Date Noted    Type 1 diabetes mellitus with hyperglycemia (H) 10/15/2024     Priority: Medium    Tachycardia 05/25/2024     Priority: Medium    DKA (diabetic ketoacidosis) (H) 05/25/2024     Priority: Medium    Pulmonary sarcoidosis 02/15/2024     Priority: Medium    Morbid obesity (H) 11/10/2022     Priority: Medium    Umbilical hernia without obstruction and without gangrene 11/10/2022     Priority: Medium    Burning sensation of feet 11/10/2022     Priority: Medium    FH: colon cancer in relative diagnosed at >50 years old 11/10/2022     Priority: Medium    Erectile dysfunction due to diseases classified elsewhere 12/15/2021     Priority: Medium    Type 1 diabetes mellitus without complication (H) 12/15/2021     Priority: Medium    Depression with anxiety 12/15/2021     Priority: Medium    Mixed hyperlipidemia 12/15/2021     Priority: Medium      Past Medical History:   Diagnosis Date    Anxiety     Depressive disorder     Diabetic gastroparesis (H) 05/2024    secondary to DKA    DKA (diabetic ketoacidosis) (H) 05/2024    Dyslipidemia     ED (erectile dysfunction)     Hypertension     Obesity     Sarcoidosis     Tubular adenoma of colon 2023    Type 1 diabetes (H) 2001     Past Surgical History:   Procedure Laterality Date    APPENDECTOMY  01/1985    BACK SURGERY  2010    lumbar calcium deposits removed    BIOPSY  2023    Lung sarcoidosis    CATARACT EXTRACTION Right 2021    COLONOSCOPY N/A 02/02/2023    Procedure: COLONOSCOPY with  polypectomy;  Surgeon: Gildardo Nichols MD;  Location: [facility] OR    ENDOSCOPY UPPER, COLONOSCOPY, COMBINED N/A 12/19/2024    Procedure: Upper Endoscopy with Biopsies, colonoscopy with polypectomy;  Surgeon: Gildardo Nichols MD;  Location: [facility] OR    EYE SURGERY  2022    Catarac lens replacement    IMPLANT PROSTHESIS PENIS INFLATABLE N/A 01/03/2023    Procedure: INSERTION of INFLATABLE PENILE PROSTHESIS;  Surgeon: Amandeep Hill MD;  Location: [facility] OR     Current Outpatient Medications   Medication Sig Dispense Refill    albuterol (PROAIR HFA/PROVENTIL HFA/VENTOLIN HFA) 108 (90 Base) MCG/ACT inhaler Inhale 1 puff into the lungs every 6 hours as needed.      atorvastatin (LIPITOR) 20 MG tablet Take 1 tablet (20 mg) by mouth daily. 90 tablet 2    Continuous Blood Gluc  (DEXCOM G6 ) JUSTEN       Continuous Glucose Sensor (DEXCOM G6 SENSOR) MISC Change every 10 days. 9 each 4    Continuous Glucose Transmitter (DEXCOM G6 TRANSMITTER) MISC Change every 3 months. 1 each 3    escitalopram (LEXAPRO) 20 MG tablet Take 1 tablet (20 mg) by mouth daily. 90 tablet 2    esomeprazole (NEXIUM) 40 MG DR capsule Take 1 capsule (40 mg) by mouth every morning (before breakfast). - Oral 90 capsule 2    gabapentin (NEURONTIN) 300 MG capsule TAKE 1 TO 2 CAPSULES AT NIGHT AS NEEDED 180 capsule 2    Insulin Infusion Pump (T: SLIM X2 INS /CONTROL 7.4) JUSTEN       Insulin Infusion Pump Supplies (AUTOSOFT 90 INFUSION SET) MISC USE 1 INFUSION SET EVERY 48 HOURS 40 each 3    Insulin Infusion Pump Supplies (T:SLIM X2 3ML CARTRIDGE) MISC USE 1 CARTRIDGE EVERY 48 HOURS 40 each 3    insulin lispro (HUMALOG) 100 UNIT/ML vial USE WITH INSULIN PUMP, TOTAL DAILY DOSE  UNITS 140 mL 3    lisinopril (ZESTRIL) 10 MG tablet Take 1 tablet (10 mg) by mouth daily. 90 tablet 0    multivitamin w/minerals (THERA-VIT-M) tablet Take 1 tablet by mouth daily.      phentermine 15 MG capsule Take 1 capsule (15 mg) by mouth every morning. 30  "capsule 2    Spacer/Aero-Holding Chambers (EQ SPACE CHAMBER ANTI-STATIC S) JUSTEN       TRELEGY ELLIPTA 200-62.5-25 MCG/ACT oral inhaler Inhale 1 puff into the lungs daily.         No Known Allergies     Social History     Tobacco Use    Smoking status: Never     Passive exposure: Never    Smokeless tobacco: Never   Substance Use Topics    Alcohol use: Yes     Comment: socially       History   Drug Use Unknown             Review of Systems  Review of Systems   Constitutional:  Negative for fever.   HENT:  Negative for sore throat.    Respiratory:  Negative for cough and shortness of breath.    Cardiovascular:  Negative for chest pain, palpitations and leg swelling.   Neurological:  Negative for dizziness and headaches.         Objective    /80 (BP Location: Left arm, Patient Position: Sitting, Cuff Size: Adult Large)   Pulse 87   Temp (!) 96.3  F (35.7  C) (Tympanic)   Resp 18   Wt 108.8 kg (239 lb 14.4 oz)   SpO2 96%   BMI 35.43 kg/m     Estimated body mass index is 35.43 kg/m  as calculated from the following:    Height as of 5/21/25: 1.753 m (5' 9\").    Weight as of this encounter: 108.8 kg (239 lb 14.4 oz).  Physical Exam  Constitutional:       General: He is not in acute distress.     Appearance: Normal appearance.   HENT:      Head: Normocephalic and atraumatic.      Right Ear: Tympanic membrane and ear canal normal.      Left Ear: Tympanic membrane and ear canal normal.      Mouth/Throat:      Mouth: Mucous membranes are moist.      Pharynx: Oropharynx is clear.   Eyes:      Conjunctiva/sclera: Conjunctivae normal.      Pupils: Pupils are equal, round, and reactive to light.   Neck:      Vascular: No carotid bruit.   Cardiovascular:      Rate and Rhythm: Normal rate and regular rhythm.      Heart sounds: Normal heart sounds. No murmur heard.  Pulmonary:      Effort: Pulmonary effort is normal.      Breath sounds: Normal breath sounds.   Abdominal:      General: Bowel sounds are normal. There is no " distension.      Palpations: Abdomen is soft.      Tenderness: There is no abdominal tenderness. There is no guarding.      Hernia: A hernia (umbilical) is present.   Musculoskeletal:      Cervical back: Normal range of motion.      Right lower leg: No edema.      Left lower leg: No edema.   Lymphadenopathy:      Cervical: No cervical adenopathy.   Neurological:      Mental Status: He is alert and oriented to person, place, and time.           Recent Labs   Lab Test 02/18/25  1041 11/20/24  0825 10/15/24  1024   HGB  --   --  14.8   PLT  --   --  185    137 138   POTASSIUM 4.3 3.9 4.0   CR 0.83 0.87 0.81   A1C 7.1*  --  7.0*        Diagnostics  Recent Results (from the past 48 hours)   Basic metabolic panel    Collection Time: 07/22/25  9:30 AM   Result Value Ref Range    Sodium 138 135 - 145 mmol/L    Potassium 4.1 3.4 - 5.3 mmol/L    Chloride 102 98 - 107 mmol/L    Carbon Dioxide (CO2) 23 22 - 29 mmol/L    Anion Gap 13 7 - 15 mmol/L    Urea Nitrogen 17.6 6.0 - 20.0 mg/dL    Creatinine 1.07 0.67 - 1.17 mg/dL    GFR Estimate 85 >60 mL/min/1.73m2    Calcium 9.5 8.8 - 10.4 mg/dL    Glucose 147 (H) 70 - 99 mg/dL   Hemoglobin A1c    Collection Time: 07/22/25  9:30 AM   Result Value Ref Range    Estimated Average Glucose 120 (H) <117 mg/dL    Hemoglobin A1C 5.8 (H) <5.7 %   CBC with platelets and differential    Collection Time: 07/22/25  9:30 AM   Result Value Ref Range    WBC Count 5.3 4.0 - 11.0 10e3/uL    RBC Count 5.17 4.40 - 5.90 10e6/uL    Hemoglobin 16.0 13.3 - 17.7 g/dL    Hematocrit 44.1 40.0 - 53.0 %    MCV 85 78 - 100 fL    MCH 30.9 26.5 - 33.0 pg    MCHC 36.3 31.5 - 36.5 g/dL    RDW 12.3 10.0 - 15.0 %    Platelet Count 239 150 - 450 10e3/uL    % Neutrophils 56 %    % Lymphocytes 25 %    % Monocytes 13 %    % Eosinophils 5 %    % Basophils 1 %    % Immature Granulocytes 0 %    NRBCs per 100 WBC 0 <1 /100    Absolute Neutrophils 3.0 1.6 - 8.3 10e3/uL    Absolute Lymphocytes 1.3 0.8 - 5.3 10e3/uL     Absolute Monocytes 0.7 0.0 - 1.3 10e3/uL    Absolute Eosinophils 0.2 0.0 - 0.7 10e3/uL    Absolute Basophils 0.0 0.0 - 0.2 10e3/uL    Absolute Immature Granulocytes 0.0 <=0.4 10e3/uL    Absolute NRBCs 0.0 10e3/uL        EKG dated 3/18/25:  NSR rate 77 .    Echo 3/27/25:  EF 55-60%           Signed Electronically by: Kota Echeverria DO  A copy of this evaluation report is provided to the requesting physician.

## 2025-07-22 NOTE — PATIENT INSTRUCTIONS
Phentermine - hold x7 days before surgery.  Lisinopril - hold AM of surgery - may need to stop sooner if Blood Pressure low with stopping Phentermine.  Other meds okay to take as usual.

## 2025-07-24 NOTE — PROGRESS NOTES
"Called Gabriel  He's still on the Tandem t:slim x2 with control IQ with the Dexcom G7    States that he's been hospitalized twice since the insulin pump and both times his BGs were in better control when he wore the insulin pump  First, was surgery at [facility]  Second, was in for DKA  Reports he placed the insulin pump to \"my profile and this evened it out\"    Noted last A1c  Lab Results   Component Value Date    A1C 5.8 07/22/2025    A1C 7.1 02/18/2025    A1C 7.0 10/15/2024    A1C 7.1 05/25/2024    A1C 8.6 02/15/2024     Tells me that he's not having a lot of low BGs  He's lost 10 lb  Working out   Watching his food intake    Plan:  If he can't wear his insulin pump during the procedure, he's going to remove it before surgery and then place it back after surgery  I reminded him that insulin pumps shouldn't be removed for more than one more  Also, encouraged him to use his activity mode before and after surgery until he starts eating/tolerating food  Reminded him that if he has a lower BG on the overnight (when he's NPO), he can correct it with 4 tablets of non-red glucose tablets (these are non-residual) and change his insulin pump to activity mode    No further questions  Told him that he starts having more lower BGs, I would gladly see him in diabetes      Evelin GUZMAN [facility]  Diabetes and Wound Care      "

## 2025-07-28 ENCOUNTER — ANESTHESIA EVENT (OUTPATIENT)
Dept: SURGERY | Facility: HOSPITAL | Age: 50
End: 2025-07-28
Payer: COMMERCIAL

## 2025-07-28 ASSESSMENT — ENCOUNTER SYMPTOMS
ORTHOPNEA: 1
DYSRHYTHMIAS: 1

## 2025-07-28 NOTE — ANESTHESIA PREPROCEDURE EVALUATION
Anesthesia Pre-Procedure Evaluation    Patient: Yaya Mg   MRN: [id number] : 1975          Procedure : Procedure(s):  Open Umbilical Hernia Repair with Mesh         Past Medical History:   Diagnosis Date    Anxiety     Depressive disorder     Diabetic gastroparesis (H) 2024    secondary to DKA    DKA (diabetic ketoacidosis) (H) 2024    Dyslipidemia     ED (erectile dysfunction)     Hypertension     Obesity     Sarcoidosis     Tubular adenoma of colon     Type 1 diabetes (H)       Past Surgical History:   Procedure Laterality Date    APPENDECTOMY  1985    BACK SURGERY      lumbar calcium deposits removed    BIOPSY      Lung sarcoidosis    CATARACT EXTRACTION Right     COLONOSCOPY N/A 2023    Procedure: COLONOSCOPY with polypectomy;  Surgeon: Gildardo Nichols MD;  Location: [facility]    ENDOSCOPY UPPER, COLONOSCOPY, COMBINED N/A 2024    Procedure: Upper Endoscopy with Biopsies, colonoscopy with polypectomy;  Surgeon: Gildardo Nichols MD;  Location: [facility]    EYE SURGERY      Catarac lens replacement    IMPLANT PROSTHESIS PENIS INFLATABLE N/A 2023    Procedure: INSERTION of INFLATABLE PENILE PROSTHESIS;  Surgeon: Amandeep Hill MD;  Location: [facility]      No Known Allergies   Social History     Tobacco Use    Smoking status: Never     Passive exposure: Never    Smokeless tobacco: Never   Substance Use Topics    Alcohol use: Yes     Comment: socially      Wt Readings from Last 1 Encounters:   25 108.8 kg (239 lb 14.4 oz)        Anesthesia Evaluation   Pt has had prior anesthetic. Type: General.    No history of anesthetic complications       ROS/MED HX  ENT/Pulmonary: Comment: Pulmonary sarcoidosis - no current steroids - follows with [facility]    24 Pulmonology f/up - steroids being tapered off (will have received total of 9 months), continue Dulera, Prior sleep study dx - updating sleep study    24 Sleep study - RAMAKRISHNA  present    11/19/24 PFT's: low-normal range    (+) sleep apnea, uses CPAP,                                      Neurologic:     (+)    peripheral neuropathy, - burning sensation in feet.                           Cardiovascular: Comment: tachycardia    (+) Dyslipidemia hypertension-range: lisinopril (108/80 on 7/22/25)/ Peripheral Vascular Disease-   -  - -   Taking blood thinners         orthopnea/PND,            dysrhythmias, Other,        Previous cardiac testing   Echo: Date: 3/27/25 Results:  Global right ventricular function is normal.  No aortic stenosis is present.  Global and regional left ventricular function is normal with an EF of 55-60%.  Both atria appear normal.  The mitral valve is normal.  The tricuspid valve is normal.      Stress Test:  Date: 5/2022 Results:     There is no prior study for comparison.      The nuclear stress test is negative for inducible myocardial ischemia   or infarction. The left ventricular ejection fraction at rest is 75%.  The   left ventricular ejection fraction at stress is 71%. Left ventricular   function is normal.     ECG Reviewed:  Date: 3/18/25 Results:  HR 77, sinus  Cath:  Date: 10/2023 Results:  Angiogram - normal    METS/Exercise Tolerance:     Hematologic:  - neg hematologic  ROS     Musculoskeletal: Comment: Back pain      GI/Hepatic: Comment: Globus sensation    (+) GERD (Nexium),                   Renal/Genitourinary:  - neg Renal ROS     Endo: Comment: CGM    (+) type I DM,  Last HgA1c: 5.8, date: 7/22/25, Using insulin, - using insulin pump.  Previously admitted for DM/DKA. Diabetic complications: neuropathy retinopathy.      Obesity,       Psychiatric/Substance Use:     (+) psychiatric history anxiety and depression       Infectious Disease:  - neg infectious disease ROS     Malignancy:  - neg malignancy ROS     Other:  - neg other ROS    (+)  , H/O Chronic Pain,           Physical Exam  Airway  Mallampati: I  Neck ROM: full  Mouth opening: >= 4  "cm    Cardiovascular   Rhythm: regular  Rate: normal rate   Comments: tachycardia  Dental   (+) Completely normal teeth      Pulmonary Breath sounds clear to auscultation        Neurological   He appears awake, alert and oriented x3.    Other Findings       OUTSIDE LABS:  CBC:   Lab Results   Component Value Date    WBC 5.3 07/22/2025    WBC 4.9 10/15/2024    HGB 16.0 07/22/2025    HGB 14.8 10/15/2024    HCT 44.1 07/22/2025    HCT 40.4 10/15/2024     07/22/2025     10/15/2024     BMP:   Lab Results   Component Value Date     07/22/2025     02/18/2025    POTASSIUM 4.1 07/22/2025    POTASSIUM 4.3 02/18/2025    CHLORIDE 102 07/22/2025    CHLORIDE 101 02/18/2025    CO2 23 07/22/2025    CO2 28 02/18/2025    BUN 17.6 07/22/2025    BUN 10.2 02/18/2025    CR 1.07 07/22/2025    CR 0.83 02/18/2025     (H) 07/22/2025     (H) 02/18/2025     COAGS: No results found for: \"PTT\", \"INR\", \"FIBR\"  POC: No results found for: \"BGM\", \"HCG\", \"HCGS\"  HEPATIC:   Lab Results   Component Value Date    ALBUMIN 3.2 (L) 05/27/2024    PROTTOTAL 5.2 (L) 05/27/2024    ALT 25 05/27/2024    AST 20 05/27/2024    ALKPHOS 69 05/27/2024    BILITOTAL 0.5 05/27/2024     OTHER:   Lab Results   Component Value Date    LACT 1.1 05/25/2024    A1C 5.8 (H) 07/22/2025    SONIA 9.5 07/22/2025    MAG 1.8 06/03/2024    LIPASE 7 (L) 05/25/2024    TSH 0.64 05/25/2024    CRP <2.9 05/24/2022    SED 7 10/03/2023       Anesthesia Plan    ASA Status:  3      NPO Status: NPO Appropriate   Anesthesia Type: General.  Induction: intravenous.  Maintenance: Balanced.   Techniques and Equipment:       - Monitoring Plan: standard ASA monitoring     Consents    Anesthesia Plan(s) and associated risks, benefits, and realistic alternatives discussed. Questions answered and patient/representative(s) expressed understanding.     - Discussed: CRNA     - Discussed with:  Patient        - Pt is DNR/DNI Status: no DNR     Blood Consent:      - " Discussed with: patient.     Postoperative Care    Pain management: peripheral nerve block.     Comments:    Other Comments: Reviewed chart and 7/22 [facility]  [place]               Antonietta Mcclelland, APRN PERLA    I have reviewed the pertinent notes and labs in the chart from the past 30 days and (re)examined the patient.  Any updates or changes from those notes are reflected in this note.    Clinically Significant Risk Factors Present on Admission

## 2025-07-31 ENCOUNTER — HOSPITAL ENCOUNTER (OUTPATIENT)
Facility: HOSPITAL | Age: 50
Discharge: HOME | End: 2025-07-31
Attending: SURGERY | Admitting: SURGERY
Payer: COMMERCIAL

## 2025-07-31 ENCOUNTER — ANESTHESIA (OUTPATIENT)
Dept: SURGERY | Facility: HOSPITAL | Age: 50
End: 2025-07-31
Payer: COMMERCIAL

## 2025-07-31 ENCOUNTER — APPOINTMENT (OUTPATIENT)
Dept: ULTRASOUND IMAGING | Facility: HOSPITAL | Age: 50
End: 2025-07-31
Attending: NURSE ANESTHETIST, CERTIFIED REGISTERED
Payer: COMMERCIAL

## 2025-07-31 VITALS
DIASTOLIC BLOOD PRESSURE: 65 MMHG | SYSTOLIC BLOOD PRESSURE: 110 MMHG | WEIGHT: 238.8 LBS | BODY MASS INDEX: 35.37 KG/M2 | HEIGHT: 69 IN | TEMPERATURE: 97.3 F | RESPIRATION RATE: 16 BRPM | HEART RATE: 66 BPM | OXYGEN SATURATION: 93 %

## 2025-07-31 DIAGNOSIS — K42.9 UMBILICAL HERNIA WITHOUT OBSTRUCTION AND WITHOUT GANGRENE: Primary | ICD-10-CM

## 2025-07-31 DIAGNOSIS — D86.9 SARCOIDOSIS: Primary | ICD-10-CM

## 2025-07-31 LAB
GLUCOSE BLDC GLUCOMTR-MCNC: 207 MG/DL (ref 70–99)
GLUCOSE BLDC GLUCOMTR-MCNC: 92 MG/DL (ref 70–99)

## 2025-07-31 PROCEDURE — 272N000001 HC OR GENERAL SUPPLY STERILE: Performed by: SURGERY

## 2025-07-31 PROCEDURE — 250N000011 HC RX IP 250 OP 636: Performed by: NURSE ANESTHETIST, CERTIFIED REGISTERED

## 2025-07-31 PROCEDURE — 49593 RPR AA HRN 1ST 3-10 RDC: CPT | Performed by: SURGERY

## 2025-07-31 PROCEDURE — 250N000011 HC RX IP 250 OP 636: Performed by: SURGERY

## 2025-07-31 PROCEDURE — C1781 MESH (IMPLANTABLE): HCPCS | Performed by: SURGERY

## 2025-07-31 PROCEDURE — 250N000011 HC RX IP 250 OP 636: Performed by: NURSE PRACTITIONER

## 2025-07-31 PROCEDURE — 250N000025 HC SEVOFLURANE, PER MIN: Performed by: SURGERY

## 2025-07-31 PROCEDURE — 999N000141 HC STATISTIC PRE-PROCEDURE NURSING ASSESSMENT: Performed by: SURGERY

## 2025-07-31 PROCEDURE — 360N000075 HC SURGERY LEVEL 2, PER MIN: Performed by: SURGERY

## 2025-07-31 PROCEDURE — 250N000013 HC RX MED GY IP 250 OP 250 PS 637: Performed by: SURGERY

## 2025-07-31 PROCEDURE — 250N000009 HC RX 250: Performed by: NURSE ANESTHETIST, CERTIFIED REGISTERED

## 2025-07-31 PROCEDURE — 258N000003 HC RX IP 258 OP 636: Performed by: NURSE PRACTITIONER

## 2025-07-31 PROCEDURE — 82962 GLUCOSE BLOOD TEST: CPT

## 2025-07-31 PROCEDURE — 710N000012 HC RECOVERY PHASE 2, PER MINUTE: Performed by: SURGERY

## 2025-07-31 PROCEDURE — 710N000010 HC RECOVERY PHASE 1, LEVEL 2, PER MIN: Performed by: SURGERY

## 2025-07-31 PROCEDURE — 370N000017 HC ANESTHESIA TECHNICAL FEE, PER MIN: Performed by: SURGERY

## 2025-07-31 DEVICE — MESH VENTRALEX HERNIA 3.2" CIRCLE LG W/STRAP 5950009: Type: IMPLANTABLE DEVICE | Site: ABDOMEN | Status: FUNCTIONAL

## 2025-07-31 RX ORDER — BUPIVACAINE HYDROCHLORIDE 2.5 MG/ML
INJECTION, SOLUTION INFILTRATION; PERINEURAL PRN
Status: DISCONTINUED | OUTPATIENT
Start: 2025-07-31 | End: 2025-07-31 | Stop reason: HOSPADM

## 2025-07-31 RX ORDER — OXYCODONE HYDROCHLORIDE 5 MG/1
5-10 TABLET ORAL EVERY 4 HOURS PRN
Qty: 10 TABLET | Refills: 0 | Status: SHIPPED | OUTPATIENT
Start: 2025-07-31

## 2025-07-31 RX ORDER — OXYCODONE HYDROCHLORIDE 5 MG/1
5 TABLET ORAL ONCE
Refills: 0 | Status: COMPLETED | OUTPATIENT
Start: 2025-07-31 | End: 2025-07-31

## 2025-07-31 RX ORDER — ONDANSETRON 4 MG/1
4 TABLET, ORALLY DISINTEGRATING ORAL EVERY 30 MIN PRN
Status: DISCONTINUED | OUTPATIENT
Start: 2025-07-31 | End: 2025-07-31 | Stop reason: HOSPADM

## 2025-07-31 RX ORDER — HALOPERIDOL 5 MG/ML
2 INJECTION INTRAMUSCULAR
Status: DISCONTINUED | OUTPATIENT
Start: 2025-07-31 | End: 2025-07-31 | Stop reason: HOSPADM

## 2025-07-31 RX ORDER — BUPIVACAINE HYDROCHLORIDE 2.5 MG/ML
INJECTION, SOLUTION EPIDURAL; INFILTRATION; INTRACAUDAL; PERINEURAL
Status: COMPLETED | OUTPATIENT
Start: 2025-07-31 | End: 2025-07-31

## 2025-07-31 RX ORDER — BUPIVACAINE HYDROCHLORIDE 2.5 MG/ML
INJECTION, SOLUTION EPIDURAL; INFILTRATION; INTRACAUDAL; PERINEURAL
Status: DISCONTINUED
Start: 2025-07-31 | End: 2025-07-31 | Stop reason: HOSPADM

## 2025-07-31 RX ORDER — FENTANYL CITRATE 50 UG/ML
25 INJECTION, SOLUTION INTRAMUSCULAR; INTRAVENOUS EVERY 5 MIN PRN
Status: DISCONTINUED | OUTPATIENT
Start: 2025-07-31 | End: 2025-07-31 | Stop reason: HOSPADM

## 2025-07-31 RX ORDER — LIDOCAINE HYDROCHLORIDE 10 MG/ML
INJECTION, SOLUTION EPIDURAL; INFILTRATION; INTRACAUDAL; PERINEURAL
Status: DISCONTINUED
Start: 2025-07-31 | End: 2025-07-31 | Stop reason: HOSPADM

## 2025-07-31 RX ORDER — CEFAZOLIN SODIUM/WATER 2 G/20 ML
2 SYRINGE (ML) INTRAVENOUS SEE ADMIN INSTRUCTIONS
Status: DISCONTINUED | OUTPATIENT
Start: 2025-07-31 | End: 2025-07-31 | Stop reason: HOSPADM

## 2025-07-31 RX ORDER — NALOXONE HYDROCHLORIDE 0.4 MG/ML
0.1 INJECTION, SOLUTION INTRAMUSCULAR; INTRAVENOUS; SUBCUTANEOUS
Status: DISCONTINUED | OUTPATIENT
Start: 2025-07-31 | End: 2025-07-31 | Stop reason: HOSPADM

## 2025-07-31 RX ORDER — CEFAZOLIN SODIUM/WATER 2 G/20 ML
2 SYRINGE (ML) INTRAVENOUS
Status: COMPLETED | OUTPATIENT
Start: 2025-07-31 | End: 2025-07-31

## 2025-07-31 RX ORDER — DEXAMETHASONE SODIUM PHOSPHATE 10 MG/ML
INJECTION, SOLUTION INTRAMUSCULAR; INTRAVENOUS
Status: COMPLETED | OUTPATIENT
Start: 2025-07-31 | End: 2025-07-31

## 2025-07-31 RX ORDER — SODIUM CHLORIDE, SODIUM LACTATE, POTASSIUM CHLORIDE, CALCIUM CHLORIDE 600; 310; 30; 20 MG/100ML; MG/100ML; MG/100ML; MG/100ML
INJECTION, SOLUTION INTRAVENOUS CONTINUOUS
Status: DISCONTINUED | OUTPATIENT
Start: 2025-07-31 | End: 2025-07-31 | Stop reason: HOSPADM

## 2025-07-31 RX ORDER — ONDANSETRON 2 MG/ML
INJECTION INTRAMUSCULAR; INTRAVENOUS PRN
Status: DISCONTINUED | OUTPATIENT
Start: 2025-07-31 | End: 2025-07-31

## 2025-07-31 RX ORDER — DEXAMETHASONE SODIUM PHOSPHATE 4 MG/ML
4 INJECTION, SOLUTION INTRA-ARTICULAR; INTRALESIONAL; INTRAMUSCULAR; INTRAVENOUS; SOFT TISSUE
Status: DISCONTINUED | OUTPATIENT
Start: 2025-07-31 | End: 2025-07-31 | Stop reason: HOSPADM

## 2025-07-31 RX ORDER — DEXMEDETOMIDINE HYDROCHLORIDE 100 UG/ML
INJECTION, SOLUTION, CONCENTRATE INTRAVENOUS PRN
Status: DISCONTINUED | OUTPATIENT
Start: 2025-07-31 | End: 2025-07-31

## 2025-07-31 RX ORDER — FENTANYL CITRATE 50 UG/ML
INJECTION, SOLUTION INTRAMUSCULAR; INTRAVENOUS PRN
Status: DISCONTINUED | OUTPATIENT
Start: 2025-07-31 | End: 2025-07-31

## 2025-07-31 RX ORDER — HYDROXYZINE HYDROCHLORIDE 50 MG/ML
50 INJECTION, SOLUTION INTRAMUSCULAR ONCE
Status: COMPLETED | OUTPATIENT
Start: 2025-07-31 | End: 2025-07-31

## 2025-07-31 RX ORDER — PROPOFOL 10 MG/ML
INJECTION, EMULSION INTRAVENOUS PRN
Status: DISCONTINUED | OUTPATIENT
Start: 2025-07-31 | End: 2025-07-31

## 2025-07-31 RX ORDER — ONDANSETRON 2 MG/ML
4 INJECTION INTRAMUSCULAR; INTRAVENOUS EVERY 30 MIN PRN
Status: DISCONTINUED | OUTPATIENT
Start: 2025-07-31 | End: 2025-07-31 | Stop reason: HOSPADM

## 2025-07-31 RX ORDER — HYDRALAZINE HYDROCHLORIDE 20 MG/ML
2.5-5 INJECTION INTRAMUSCULAR; INTRAVENOUS EVERY 10 MIN PRN
Status: DISCONTINUED | OUTPATIENT
Start: 2025-07-31 | End: 2025-07-31 | Stop reason: HOSPADM

## 2025-07-31 RX ORDER — LIDOCAINE 40 MG/G
CREAM TOPICAL
Status: DISCONTINUED | OUTPATIENT
Start: 2025-07-31 | End: 2025-07-31 | Stop reason: HOSPADM

## 2025-07-31 RX ORDER — HYDROMORPHONE HYDROCHLORIDE 1 MG/ML
0.5 INJECTION, SOLUTION INTRAMUSCULAR; INTRAVENOUS; SUBCUTANEOUS EVERY 5 MIN PRN
Status: DISCONTINUED | OUTPATIENT
Start: 2025-07-31 | End: 2025-07-31 | Stop reason: HOSPADM

## 2025-07-31 RX ORDER — DIAZEPAM 10 MG/2ML
2.5 INJECTION, SOLUTION INTRAMUSCULAR; INTRAVENOUS
Status: DISCONTINUED | OUTPATIENT
Start: 2025-07-31 | End: 2025-07-31 | Stop reason: HOSPADM

## 2025-07-31 RX ORDER — LIDOCAINE HYDROCHLORIDE 20 MG/ML
INJECTION, SOLUTION INFILTRATION; PERINEURAL PRN
Status: DISCONTINUED | OUTPATIENT
Start: 2025-07-31 | End: 2025-07-31

## 2025-07-31 RX ORDER — KETOROLAC TROMETHAMINE 30 MG/ML
INJECTION, SOLUTION INTRAMUSCULAR; INTRAVENOUS PRN
Status: DISCONTINUED | OUTPATIENT
Start: 2025-07-31 | End: 2025-07-31

## 2025-07-31 RX ORDER — FENTANYL CITRATE 50 UG/ML
50 INJECTION, SOLUTION INTRAMUSCULAR; INTRAVENOUS EVERY 5 MIN PRN
Status: DISCONTINUED | OUTPATIENT
Start: 2025-07-31 | End: 2025-07-31 | Stop reason: HOSPADM

## 2025-07-31 RX ORDER — HYDROMORPHONE HYDROCHLORIDE 1 MG/ML
0.25 INJECTION, SOLUTION INTRAMUSCULAR; INTRAVENOUS; SUBCUTANEOUS EVERY 5 MIN PRN
Status: DISCONTINUED | OUTPATIENT
Start: 2025-07-31 | End: 2025-07-31 | Stop reason: HOSPADM

## 2025-07-31 RX ADMIN — HYDROXYZINE HYDROCHLORIDE 50 MG: 50 INJECTION, SOLUTION INTRAMUSCULAR at 10:19

## 2025-07-31 RX ADMIN — FENTANYL CITRATE 50 MCG: 50 INJECTION INTRAMUSCULAR; INTRAVENOUS at 08:35

## 2025-07-31 RX ADMIN — DEXMEDETOMIDINE HYDROCHLORIDE 80 MCG: 100 INJECTION, SOLUTION, CONCENTRATE INTRAVENOUS at 08:20

## 2025-07-31 RX ADMIN — SODIUM CHLORIDE, POTASSIUM CHLORIDE, SODIUM LACTATE AND CALCIUM CHLORIDE: 600; 310; 30; 20 INJECTION, SOLUTION INTRAVENOUS at 07:47

## 2025-07-31 RX ADMIN — BUPIVACAINE HYDROCHLORIDE 50 ML: 2.5 INJECTION, SOLUTION EPIDURAL; INFILTRATION; INTRACAUDAL at 08:15

## 2025-07-31 RX ADMIN — MIDAZOLAM 2 MG: 1 INJECTION INTRAMUSCULAR; INTRAVENOUS at 08:15

## 2025-07-31 RX ADMIN — ONDANSETRON 4 MG: 2 INJECTION INTRAMUSCULAR; INTRAVENOUS at 09:21

## 2025-07-31 RX ADMIN — PROPOFOL 200 MG: 10 INJECTION, EMULSION INTRAVENOUS at 08:35

## 2025-07-31 RX ADMIN — LIDOCAINE HYDROCHLORIDE 100 MG: 20 INJECTION, SOLUTION INFILTRATION; PERINEURAL at 08:35

## 2025-07-31 RX ADMIN — OXYCODONE HYDROCHLORIDE 5 MG: 5 TABLET ORAL at 10:39

## 2025-07-31 RX ADMIN — FENTANYL CITRATE 50 MCG: 50 INJECTION INTRAMUSCULAR; INTRAVENOUS at 08:15

## 2025-07-31 RX ADMIN — DEXAMETHASONE SODIUM PHOSPHATE 10 MG: 10 INJECTION, SOLUTION INTRAMUSCULAR; INTRAVENOUS at 08:15

## 2025-07-31 RX ADMIN — Medication 2 G: at 08:28

## 2025-07-31 RX ADMIN — FENTANYL CITRATE 50 MCG: 50 INJECTION, SOLUTION INTRAMUSCULAR; INTRAVENOUS at 10:15

## 2025-07-31 RX ADMIN — SODIUM CHLORIDE, POTASSIUM CHLORIDE, SODIUM LACTATE AND CALCIUM CHLORIDE: 600; 310; 30; 20 INJECTION, SOLUTION INTRAVENOUS at 09:26

## 2025-07-31 RX ADMIN — KETOROLAC TROMETHAMINE 30 MG: 30 INJECTION, SOLUTION INTRAMUSCULAR at 09:29

## 2025-07-31 ASSESSMENT — ACTIVITIES OF DAILY LIVING (ADL)
ADLS_ACUITY_SCORE: 18
ADLS_ACUITY_SCORE: 22
ADLS_ACUITY_SCORE: 18
ADLS_ACUITY_SCORE: 24

## 2025-07-31 NOTE — DISCHARGE INSTRUCTIONS
Thank you for allowing [facility] to care for you today.  If you have any questions and/or concerns please reach out to us Monday-Friday 0800-4:00 PM at [phone].  After hours please go to the Urgent Care and/or Emergency Room.  If you feel like it is an emergency call 911.

## 2025-07-31 NOTE — ANESTHESIA PROCEDURE NOTES
Airway       Patient location during procedure: OR  Staff -        CRNA: Kyrie Ferreira APRN CRNA       Performed By: CRNA  Consent for Airway        Urgency: elective  Indications and Patient Condition       Indications for airway management: kristy-procedural       Induction type:intravenous       Mask difficulty assessment: 1 - vent by mask    Final Airway Details       Final airway type: supraglottic airway    Supraglottic Airway Details        Type: LMA       Brand: I-Gel       LMA size: 5    Post intubation assessment        Placement verified by: capnometry, equal breath sounds and chest rise        Number of attempts at approach: 1       Secured with: plastic tape       Ease of procedure: easy       Dentition: Intact and Unchanged

## 2025-07-31 NOTE — OR NURSING
Patient and responsible adult given discharge instructions with no questions regarding instructions. Nikita score 19/20. Pain level 4/10.  Discharged from unit via ambulation. Patient discharged to home with spouse.

## 2025-07-31 NOTE — ANESTHESIA POSTPROCEDURE EVALUATION
Patient: Yaya Mg    Procedure: Procedure(s):  Open Umbilical Hernia Repair with Mesh       Anesthesia Type:  General    Note:  Disposition: Outpatient   Postop Pain Control: Uneventful            Sign Out: Well controlled pain   PONV: No   Neuro/Psych: Uneventful            Sign Out: Acceptable/Baseline neuro status   Airway/Respiratory: Uneventful            Sign Out: Acceptable/Baseline resp. status   CV/Hemodynamics: Uneventful            Sign Out: Acceptable CV status; No obvious hypovolemia; No obvious fluid overload   Other NRE: NONE   DID A NON-ROUTINE EVENT OCCUR? No           Last vitals:  Vitals Value Taken Time   /68 07/31/25 10:30   Temp 97.8  F (36.6  C) 07/31/25 10:30   Pulse 67 07/31/25 10:30   Resp 11 07/31/25 10:30   SpO2 92 % 07/31/25 10:30   Vitals shown include unfiled device data.    Electronically Signed By: THOMAS Evans CRNA  July 31, 2025  11:31 AM

## 2025-07-31 NOTE — OP NOTE
REPORT OF OPERATION  DATE OF PROCEDURE: 7/31/2025    PATIENT: Yaya Mg    Operation: Procedure(s):  Open Umbilical Hernia Repair with Mesh     PREOPERATIVE DIAGNOSIS: Umbilical hernia    POSTOPERATIVE DIAGNOSIS: Same    SURGEON: Anton Chun MD    ASSISTANTS: Conchita Gibbs.  Her assistance was required because of the technical aspects of the case.    ANESTHESIA: General    COMPLICATIONS: None    ESTIMATED BLOOD LOSS: 5 cc    TRANSFUSIONS: None    PATHOLOGY: None    FINDINGS: Successful repair of umbilical hernia    INDICATIONS:  This is a 50 year old male presenting with symptomatic umbilical hernia.  He was consented for open umbilical hernia repair with mesh.     DESCRIPTIONS OF PROCEDURE IN DETAIL: After consent was obtained the patient was taken to the operative suite and kevin in the supine position.  The patient was identified and the correct patient was confirmed.  General endotracheal anesthesia was induced by anesthesia team.  The patient was sterilely prepped and draped in the usual fashion.  A time out was performed verifying the correct patient and the correct procedure.  The entire operative team was in agreement.  All necessary equipment and supplies were in the room.    0.25% marcaine was injected in the dermis at the site of planned incision.  An infraumbilical curvilinear incision was made.  The hernia sac was dissected sharply off of the overlying dermis.  It was then dissected off of the surrounding fascia with electrocautery.  The hernia was reduced into the preperitoneal space.  The preperitoneal space was then developed with blunt dissection to ensure adequate room for the mesh.    The hernia defect measured 4 cm.  An 8 cm ventralex mesh was brought into the field and submerged in saline.  The mesh was then placed in the preperitoneal space.  The tabs were cut and sutured to the superior and inferior aspects of the fascia in a U stitch fashion with O PDS.  The fascial defect  was then closed with interrupted sutures with O PDS.      The wound was then irrigated.  Hemostasis was achieved with electrocautery.  The subcutaneous space was approximated with 3 O vicryl.  The dermis was then closed with 4 O monocryl.  Dermabond was applied over the incision.  The patient tolerated the procedure well and was transferred to PACU in stable condition.

## 2025-07-31 NOTE — ANESTHESIA CARE TRANSFER NOTE
Patient: Yaya Mg    Procedure: Procedure(s):  Open Umbilical Hernia Repair with Mesh       Diagnosis: Umbilical hernia without obstruction and without gangrene [K42.9]  Diagnosis Additional Information: No value filed.    Anesthesia Type:   General     Note:    Oropharynx: spontaneously breathing  Level of Consciousness: awake and drowsy  Oxygen Supplementation: nasal cannula    Independent Airway: airway patency satisfactory and stable  Dentition: dentition unchanged  Vital Signs Stable: post-procedure vital signs reviewed and stable  Report to RN Given: handoff report given  Patient transferred to: PACU    Handoff Report: Identifed the Patient, Identified the Reponsible Provider, Reviewed the pertinent medical history, Discussed the surgical course, Reviewed Intra-OP anesthesia mangement and issues during anesthesia, Set expectations for post-procedure period and Allowed opportunity for questions and acknowledgement of understanding  Vitals:  Vitals Value Taken Time   /61 07/31/25 09:49   Temp     Pulse 65 07/31/25 09:50   Resp 13 07/31/25 09:51   SpO2 91 % 07/31/25 09:51   Vitals shown include unfiled device data.    Electronically Signed By: THOMAS Lee CRNA  July 31, 2025  9:52 AM

## 2025-07-31 NOTE — ANESTHESIA PROCEDURE NOTES
TAP Procedure Note    Pre-Procedure   Staff -        CRNA: Kyrie Ferreira APRN CRNA       Performed By: CRNA       Location: OR       Procedure Start/Stop Times: 7/31/2025 8:15 AM and 7/31/2025 8:20 AM       Pre-Anesthestic Checklist: patient identified, IV checked, site marked, risks and benefits discussed, informed consent, monitors and equipment checked, pre-op evaluation, at physician/surgeon's request and post-op pain management  Timeout:       Correct Patient: Yes        Correct Procedure: Yes        Correct Site: Yes        Correct Position: Yes        Correct Laterality: Yes        Site Marked: Yes  Procedure Documentation  Procedure: TAP         Diagnosis: POST OP PAIN CONTROL       Laterality: bilateral       Patient Position: supine       Skin prep: Chloraprep       Needle Type: insulated and short bevel       Needle Gauge: 20.        Needle Length (Inches): 4        Ultrasound guided       1. Ultrasound was used to identify targeted nerve, plexus, vascular marker, or fascial plane and place a needle adjacent to it in real-time.       2. Ultrasound was used to visualize the spread of anesthetic in close proximity to the above referenced structure.       3. A permanent image is entered into the patient's record.       4. The visualized anatomic structures appeared normal.       5. There were no apparent abnormal pathologic findings.    Assessment/Narrative         The placement was negative for: blood aspirated, painful injection and site bleeding       Paresthesias: No.       Bolus given via needle..        Secured via.        Insertion/Infusion Method: Single Shot       Complications: none       Injection made incrementally with aspirations every 5 mL.    Medication(s) Administered   Bupivacaine 0.25% PF (Infiltration) - Infiltration   50 mL - 7/31/2025 8:15:00 AM  Dexamethasone 10 mg/mL PF (Perineural) - Perineural   10 mg - 7/31/2025 8:15:00 AM  Medication Administration Time: 7/31/2025 8:15  "AM     Comments:  Risks for regional anesthesia include but not limited to: infection, seizures, continued weakness or numbness, pain at injection site, injury to blood vessels      FOR [facility] [place] ONLY:   Pain Team Contact information: please page the Pain Team Via [employer]. Search \"Pain\". During daytime hours, please page the attending first. At night please page the resident first.      "

## 2025-08-23 DIAGNOSIS — E10.9 TYPE 1 DIABETES MELLITUS WITHOUT COMPLICATION (H): ICD-10-CM

## 2025-08-25 RX ORDER — LISINOPRIL 10 MG/1
10 TABLET ORAL DAILY
Qty: 90 TABLET | Refills: 3 | Status: SHIPPED | OUTPATIENT
Start: 2025-08-25

## 2025-08-27 PROBLEM — D86.0 PULMONARY SARCOIDOSIS: Status: ACTIVE | Noted: 2023-12-13

## (undated) DEVICE — CONNECTOR ERBEFLO 2 PORT 20325-215

## (undated) DEVICE — TUBING SUCTION MEDI-VAC 1/4"X20' N620A

## (undated) DEVICE — CATH PLUG W/CAP 000076

## (undated) DEVICE — DRSG KERLIX 4 1/2"X4YDS ROLL 6730

## (undated) DEVICE — CANISTER SUCTION MEDI-VAC GUARDIAN 2000ML 90D 65651-220

## (undated) DEVICE — DRAPE IOBAN C-SECTION W/POUCH 30X35" 6657

## (undated) DEVICE — SYR 20ML LL W/O NDL 302830

## (undated) DEVICE — LINEN TOWEL PACK X5 5464

## (undated) DEVICE — CATH TRAY FOLEY SURESTEP 16FR WDRAIN BAG STLK LATEX A300316A

## (undated) DEVICE — SU VICRYL 3-0 SH 27" UND J416H

## (undated) DEVICE — ESU GROUND PAD ADULT W/CORD E7507

## (undated) DEVICE — SU CHROMIC 3-0 SH 27" G122H

## (undated) DEVICE — PACK LAPAROTOMY CUSTOM SBA32LPMBG

## (undated) DEVICE — DRAPE LAP TRANSVERSE 29421

## (undated) DEVICE — SUTURE-PDS II 0 CT-2 Z334H

## (undated) DEVICE — SOL NACL 0.9% IRRIG 1000ML BOTTLE 2F7124

## (undated) DEVICE — GOWN SURG XL LVL 3 REINFORCED 9541

## (undated) DEVICE — DRSG TEGADERM 4X4 3/4" 1626

## (undated) DEVICE — SUCTION MANIFOLD NEPTUNE 2 SYS 1 PORT 702-025-000

## (undated) DEVICE — SNARE ROT MED OVAL PLYPCTM 20MM/195CML/2.4MM M00561831

## (undated) DEVICE — DRAIN JACKSON PRATT RESERVOIR 100ML SU130-1305

## (undated) DEVICE — ENDO TRAP POLYP E-TRAP 00711099

## (undated) DEVICE — SUCTION MANIFOLD NEPTUNE 2 SYS 4 PORT 0702-020-000

## (undated) DEVICE — TUBING SUCTION 20FT N620A

## (undated) DEVICE — RETR PENILE WILSON XL 12"  TLC-5042-M

## (undated) DEVICE — SU ETHILON 2-0 PS 18" 585H

## (undated) DEVICE — FORCEPS BIOPSY RADIAL JAW 4 LARGE W/NEEDLE 240CM M00513332

## (undated) DEVICE — WIPES FOLEY CARE SURESTEP PROVON DFC100

## (undated) DEVICE — SU MONOCRYL 4-0 PS-2 18" UND Y496G

## (undated) DEVICE — LINEN ORTHO PACK 5446

## (undated) DEVICE — DRAIN JACKSON PRATT 10FR ROUND SU130-1321

## (undated) DEVICE — BLADE 11 RB BK SS STRL LF DISP 371211

## (undated) DEVICE — SU VICRYL 2-0 SH 27" UND J417H

## (undated) DEVICE — FORCEP-COLON BIOPSY LARGE W/NEEDLE 240CM

## (undated) DEVICE — SOL WATER IRRIG 1000ML BOTTLE 2F7114

## (undated) DEVICE — COVER CAMERA IN-LIGHT DISP LT-C02

## (undated) DEVICE — PREP CHLORAPREP 26ML TINTED HI-LITE ORANGE 930815

## (undated) DEVICE — SU PDS II 2-0 CT-2 27"  Z333H

## (undated) DEVICE — PACK BASIN SET UP SUTCNBSBBA

## (undated) DEVICE — BIN-COVIDIEN MESH BIN BN50

## (undated) DEVICE — DRAPE IOBAN INCISE 13X13" 6640EZ

## (undated) DEVICE — ESU GROUND PAD UNIVERSAL W/O CORD

## (undated) DEVICE — SUCTION TIP YANKAUER W/O VENT K86

## (undated) DEVICE — SLEEVE SCD EXPRESS KNEE LENGTH MED 9529

## (undated) DEVICE — SYR 50ML LL W/O NDL 309653

## (undated) DEVICE — GLOVE 7.5 PROTEXIS PI CLASSIC 2D72PL75X

## (undated) DEVICE — GLOVE BIOGEL PI MICRO SZ 7.5 48575

## (undated) DEVICE — LINEN GOWN X4 5410

## (undated) DEVICE — COVER LT HANDLE 2/PK 15160-2FG

## (undated) DEVICE — CLEANSER JET LAVAGE IRRISEPT 0.05% CHG IRRISEPT45USA

## (undated) DEVICE — PAD CHUX UNDERPAD 30X36" P3036C

## (undated) DEVICE — SYR BULB IRRIG DOVER 60 ML LATEX FREE 67000

## (undated) DEVICE — DRAPE MAYO STAND 23X54 8337

## (undated) DEVICE — COTTON BALL 2IN STRL C15000-300

## (undated) DEVICE — LABEL STERILE PREPRINTED FOR OR FRRH01-2M

## (undated) DEVICE — Device

## (undated) RX ORDER — BUPIVACAINE HYDROCHLORIDE 5 MG/ML
INJECTION, SOLUTION EPIDURAL; INTRACAUDAL
Status: DISPENSED
Start: 2023-01-03

## (undated) RX ORDER — DEXMEDETOMIDINE HYDROCHLORIDE 4 UG/ML
INJECTION, SOLUTION INTRAVENOUS
Status: DISPENSED
Start: 2024-12-19

## (undated) RX ORDER — PROPOFOL 10 MG/ML
INJECTION, EMULSION INTRAVENOUS
Status: DISPENSED
Start: 2025-07-31

## (undated) RX ORDER — PROPOFOL 10 MG/ML
INJECTION, EMULSION INTRAVENOUS
Status: DISPENSED
Start: 2023-02-02

## (undated) RX ORDER — BUPIVACAINE HYDROCHLORIDE 2.5 MG/ML
INJECTION, SOLUTION EPIDURAL; INFILTRATION; INTRACAUDAL; PERINEURAL
Status: DISPENSED
Start: 2025-07-31

## (undated) RX ORDER — FENTANYL CITRATE 50 UG/ML
INJECTION, SOLUTION INTRAMUSCULAR; INTRAVENOUS
Status: DISPENSED
Start: 2025-07-31

## (undated) RX ORDER — PROPOFOL 10 MG/ML
INJECTION, EMULSION INTRAVENOUS
Status: DISPENSED
Start: 2024-12-19

## (undated) RX ORDER — PROPOFOL 10 MG/ML
INJECTION, EMULSION INTRAVENOUS
Status: DISPENSED
Start: 2023-01-03

## (undated) RX ORDER — FENTANYL CITRATE 50 UG/ML
INJECTION, SOLUTION INTRAMUSCULAR; INTRAVENOUS
Status: DISPENSED
Start: 2023-01-03

## (undated) RX ORDER — DEXMEDETOMIDINE HYDROCHLORIDE 100 UG/ML
INJECTION, SOLUTION INTRAVENOUS
Status: DISPENSED
Start: 2025-07-31

## (undated) RX ORDER — SODIUM CHLORIDE 9 MG/ML
INJECTION, SOLUTION INTRAVENOUS
Status: DISPENSED
Start: 2023-01-03

## (undated) RX ORDER — HYDROMORPHONE HYDROCHLORIDE 1 MG/ML
INJECTION, SOLUTION INTRAMUSCULAR; INTRAVENOUS; SUBCUTANEOUS
Status: DISPENSED
Start: 2023-01-03

## (undated) RX ORDER — CEFAZOLIN SODIUM/WATER 2 G/20 ML
SYRINGE (ML) INTRAVENOUS
Status: DISPENSED
Start: 2023-01-03

## (undated) RX ORDER — DEXAMETHASONE SODIUM PHOSPHATE 10 MG/ML
INJECTION, SOLUTION INTRAMUSCULAR; INTRAVENOUS
Status: DISPENSED
Start: 2025-07-31

## (undated) RX ORDER — ONDANSETRON 2 MG/ML
INJECTION INTRAMUSCULAR; INTRAVENOUS
Status: DISPENSED
Start: 2025-07-31

## (undated) RX ORDER — ACETAMINOPHEN 325 MG/1
TABLET ORAL
Status: DISPENSED
Start: 2023-01-03